# Patient Record
Sex: FEMALE | Race: OTHER | Employment: UNEMPLOYED | URBAN - METROPOLITAN AREA
[De-identification: names, ages, dates, MRNs, and addresses within clinical notes are randomized per-mention and may not be internally consistent; named-entity substitution may affect disease eponyms.]

---

## 2017-01-24 PROBLEM — M17.9 OSTEOARTHRITIS, KNEE: Status: ACTIVE | Noted: 2017-01-24

## 2017-01-24 PROBLEM — E11.9 T2DM (TYPE 2 DIABETES MELLITUS) (HCC): Status: ACTIVE | Noted: 2017-01-24

## 2017-01-24 PROBLEM — M17.10 OSTEOARTHRITIS, KNEE: Status: ACTIVE | Noted: 2017-01-24

## 2017-01-25 NOTE — ASSESSMENT & PLAN NOTE
Early worsening right knee pain with swelling. Worse with use. X-rays of the knee is ordered. Use an Ace wrap as directed. Continue on tramadol and try Tylenol 500 mg 3 times a day.   Advised to follow-up with Dr. Ceasar Felton for an evaluatio

## 2017-01-25 NOTE — ASSESSMENT & PLAN NOTE
Thyroid functions look normal on  labs. Patient is currently on levothyroxine at 125 µg once daily. Continue the same dose of medications and will recheck labs in about 4-6 months.

## 2017-01-25 NOTE — ASSESSMENT & PLAN NOTE
Diabetes most likely insulin resistance. Exercise, diet controlled and metformin should help reverse some of these changes. Restriction and starches as well as carbohydrates discussed.   Patient did work with the diabetic Association and understands need

## 2017-01-25 NOTE — PROGRESS NOTES
HPI:    Patient ID: Claudine Shah is a 54year old female. Thyroid Problem  This is a chronic problem. The current episode started more than 1 year ago. The problem occurs constantly. The problem has been gradually improving.  Associated symptoms inclu Rfl: 5   Omeprazole 20 MG Oral Tab EC Take 1 tablet by mouth once daily. Disp: 90 tablet Rfl: 0   Biotin 5000 MCG Oral Cap Take 1 tablet by mouth daily. Disp:  Rfl:    Cyanocobalamin (B-12) 5000 MCG Oral Cap Take 1 tablet by mouth daily.  Disp:  Rfl:    Cra External ear normal.   Left Ear: External ear normal.   Nose: Nose normal.   Mouth/Throat: Oropharynx is clear and moist. No oropharyngeal exudate. Eyes: Conjunctivae and EOM are normal. Pupils are equal, round, and reactive to light.  Right eye exhibits knee is ordered. Use an Ace wrap as directed. Continue on tramadol and try Tylenol 500 mg 3 times a day. Advised to follow-up with Dr. Ceasar Felton for an evaluation.          Relevant Medications    Butalbital-APAP-Caffeine -40 MG Oral T 20 MG Oral Tab EC 90 tablet 0      Sig: Take 1 tablet by mouth once daily. MetFORMIN HCl  MG Oral Tablet 24 Hr 90 tablet 1      Sig: Take 1 tablet (500 mg total) by mouth daily.       Levothyroxine Sodium 125 MCG Oral Tab 90 tablet 1      Sig: Ta

## 2017-01-25 NOTE — ASSESSMENT & PLAN NOTE
Patient has completed the CPAP titration study and request for the CPAP tubing and mask has been sent in to [facility]. Per patient accounts nobody has contacted her as yet and she has not started using the CPAP.   We will contact [facility] in a.m. to

## 2017-03-03 PROBLEM — N39.0 UTI (URINARY TRACT INFECTION): Status: ACTIVE | Noted: 2017-03-03

## 2017-03-03 NOTE — ASSESSMENT & PLAN NOTE
Recent episode of dysuria with flank pain, was seen in immediate care and treated with Cipro. Dysuria has resolved but patient continues to complain of mild left flank pain.   On examination today she does not have any rebound or guarding or palpable abnor

## 2017-03-03 NOTE — PATIENT INSTRUCTIONS
Problem List Items Addressed This Visit        Unprioritized    UTI (urinary tract infection) - Primary     Recent episode of dysuria with flank pain, was seen in immediate care and treated with Cipro.   Dysuria has resolved but patient continues to complai

## 2017-03-03 NOTE — PROGRESS NOTES
HPI:    Patient ID: Jayden Chavis is a 54year old female. UTI  This is a new problem. The current episode started in the past 7 days. The problem occurs intermittently. Progression since onset: burning pain and radiation into the left flank. ,seen in Biotin 5000 MCG Oral Cap Take 1 tablet by mouth daily. Disp:  Rfl:    Cyanocobalamin (B-12) 5000 MCG Oral Cap Take 1 tablet by mouth daily. Disp:  Rfl:    Cranberry 250 MG Oral Tab Take 2 tablets by mouth daily as needed.  Disp:  Rfl:    MetFORMIN HCl ER present. Cardiovascular: Normal rate, regular rhythm, normal heart sounds and intact distal pulses. No murmur heard. Pulmonary/Chest: Effort normal and breath sounds normal. She has no wheezes. She has no rales. She exhibits no tenderness.    Abdomina Butalbital-APAP-Caffeine -40 MG Oral Tab 30 tablet 0      Sig: TAKE 1 TABLET BY MOUTH EVERY 6 HOURS AS NEEDED FOR PAIN           Imaging & Referrals:  None       YV#[id number]

## 2017-03-21 NOTE — TELEPHONE ENCOUNTER
Medication is no longer covered on this patient is seen by psychiatrist.  Patient will have to pay out of pocket and bite herself or be seen by a psychiatrist for a refill on this medication.

## 2017-03-21 NOTE — TELEPHONE ENCOUNTER
Pt states she has not been able to get her prescription for aderral from [facility] because the pharmacy states the medication needs authorization.

## 2017-03-23 NOTE — TELEPHONE ENCOUNTER
PA for Amphetamine-dextroamphetamine 15 mg tab completed with EPS via CMM response time 24-72 hours KEY [id number]. Patient is requesting a rx for Flexeril for her dislocated jaw.

## 2017-03-24 NOTE — TELEPHONE ENCOUNTER
I called pt. As VISHNU requested on results of CPAP usage that pt. Needs to use 5-6 hrs. A day. Pt.  said in the last 3 days she has been using the CPAP and said she wakes up with a headache, runny nose that is painful, sore throat.   I asked if she was getti

## 2017-03-24 NOTE — TELEPHONE ENCOUNTER
As she has not met the criteria for using the CPAP– and she has been informed of the need, insurance will deny coverage and take the machine back

## 2017-03-24 NOTE — TELEPHONE ENCOUNTER
I'm sorry Dr. Maurilio Collier. The plan does cover the medication for 12 months. What would you like for me to do at this point.

## 2017-03-24 NOTE — TELEPHONE ENCOUNTER
I did not ask you to submit a prior authorization for this medication. I did say that this medication is no longer covered unless the patient is seen by a psychiatrist.  I would prefer that you follow my directions next time.

## 2017-04-05 NOTE — PROCEDURES
Procedure: The risks and benefits of a cortisone injection were discussed with the patient. An informational sheet was also provided and the patient had ample time to review it.   Under sterile preparation, the right knee was injected with 30 mg of Kenalog

## 2017-04-05 NOTE — H&P
Chief Complaint: Right knee pain    NURSING INTAKE COMMENTS: Patient presents with:  Consult: Right knee pain- pt states pain started 1 yr ago, but over last 2 months pain has become severe. History of present illness:   This 54year old female comes Maternal Grandmother    • Diabetes Sister    • Diabetes Sister    • Obesity Sister    • Obesity Sister    • Obesity Sister         Smoking Status: Never Smoker                      Smokeless Status: Never Used                        Alcohol Use: No space one time. Assessment: 54year old female with right knee pain.     Plan:   Rest, Ice, Elevation, and NSAID's if not contraindicated from a medical standpoint  Cortisone injection   If not better, MRI

## 2017-04-05 NOTE — PROGRESS NOTES
Per verbal order from VT, draw up 3ml of Kenalog 10 and 3ml of 1% lidocaine for cortisone injection to right knee.  Bakari Rg RN

## 2017-05-16 NOTE — TELEPHONE ENCOUNTER
Actions Requested: Requesting RX  For UTI-unable to come for appt no Transportation and lives far away  Situation/Background   Problem: UTI   Onset: yesterday   Associated Symptoms: taking  Cranberry pills and Azo pills,last dose this AM,urinary frequency/ antibiotic < 3 days for UTI and painful urination not improved    Care Advice Discussed:  * Drink Extra Fluids  * Cranberry Juice  * Warm Saline SITZ Baths to Reduce Pain

## 2017-05-16 NOTE — TELEPHONE ENCOUNTER
Pt called in stating she has a UTI. Pt states she knows it's a UTI because she oftenly gets them. Pt states she has the urination frequency feeling, burning and pain sensation when urinating and has lower abdominal pain.   Pt states she cannot come in for

## 2017-05-18 NOTE — TELEPHONE ENCOUNTER
VISHNU - please advise regarding CPAP usage below. HME is also asking if you would like to send a formal order to D/C treatment or if HME should just p/u equipment AMA.    (pt has not been contacted yet - pending your review and instructions)

## 2017-05-18 NOTE — TELEPHONE ENCOUNTER
Nicolas Welch states that he faxed a form today with the information for a 90 day usage on patient's CPAP machine. Per Nuno the patient only used 32% usage in the past 90 days. The insurance looks for a 70 % usage of 90 day.  Nicolas Welch would like to inform the doctor jai

## 2017-05-18 NOTE — TELEPHONE ENCOUNTER
I do not recommend stopping use. They can pick it up AMA. Please let the patient know that this will happen.

## 2017-05-18 NOTE — TELEPHONE ENCOUNTER
Pt was called and notified regarding notification from E regarding CPAP denial. Per insurance guidelines - pt must use CPAP for 4 hours per night on 70% of nights during the entire rental period of the machine.   (summary report shows that pt has only bee

## 2017-05-31 NOTE — TELEPHONE ENCOUNTER
No pt needs to keep scheduled appt and will do the follow up needs at that time,if she did not use it on vacation as well as the required amount of time as specified to her clearly then she can wait to be seen.

## 2017-05-31 NOTE — TELEPHONE ENCOUNTER
[facility] from [address] [place] [address] called back and stated [employer] \"doesn't care\" if pt was on vacation or not. Pt must use equipment for at least 70% of the nights during the first 90 day period. Equipment was already p/u from the pt's home on 05/19/17.   [facility] states i

## 2017-06-27 PROBLEM — M25.361 INSTABILITY OF RIGHT KNEE JOINT: Status: ACTIVE | Noted: 2017-06-27

## 2017-06-28 NOTE — ASSESSMENT & PLAN NOTE
Chronic low back pain and mid back pain associated with scoliosis. Has had multiple interventions done none of which have helped thus far. Last seen by Dr. Italo Nagy  In 2015.   Her visits as well as further testing but not covered under her current insurance a

## 2017-06-28 NOTE — PROGRESS NOTES
HPI:    Patient ID: Jayden Chavis is a 50year old female. Thyroid Problem   This is a chronic problem. The current episode started more than 1 year ago. The problem occurs constantly. The problem has been gradually improving.  Associated symptoms incl moderate relief. Her past medical history is significant for osteoarthritis. Review of Systems   Constitutional: Negative. HENT: Negative. Eyes: Negative. Respiratory: Negative. Cardiovascular: Negative. Gastrointestinal: Negative.   Ne TEST In Vitro Strip TEST ONCE D Disp:  Rfl: 0   TRUEPLUS LANCETS 33G Does not apply Misc TEST ONCE D Disp:  Rfl: 0   Citalopram Hydrobromide 20 MG Oral Tab TAKE 1 TABLET(20 MG) BY MOUTH DAILY Disp: 30 tablet Rfl: 5   Biotin 5000 MCG Oral Cap Take 1 tablet There is no tenderness. There is no rebound. Musculoskeletal: She exhibits no edema or tenderness. Lymphadenopathy:     She has no cervical adenopathy. Neurological: She is alert and oriented to person, place, and time. She has normal reflexes.  No cr Other Relevant Orders    CBC WITH DIFFERENTIAL WITH PLATELET    COMP METABOLIC PANEL (14)    HEMOGLOBIN A1C    LIPID PANEL    ASSAY, THYROID STIM HORMONE    URINALYSIS, ROUTINE    MICROALB/CREAT RATIO, RANDOM URINE    OPTOMETRY - INTERNAL    Thoracic radic 0      Sig: Take 1 tablet (15 mg total) by mouth 2 (two) times daily. amphetamine-dextroamphetamine (ADDERALL) 15 MG Oral Tab 60 tablet 0      Sig: Take 1 tablet (15 mg total) by mouth 2 (two) times daily.       amphetamine-dextroamphetamine (ADDERALL)

## 2017-06-28 NOTE — ASSESSMENT & PLAN NOTE
Recent fall with prepatellar hematoma. Slight instability with difficulty walking. Advised to follow-up with orthopedics–Dr. July Cedillo for an evaluation. Referral has been generated.   Meloxicam has been started at 15 mg 1 tablet once daily after meal.

## 2017-06-28 NOTE — PATIENT INSTRUCTIONS
Problem List Items Addressed This Visit        Unprioritized    Hypothyroidism - Primary    Relevant Medications    Levothyroxine Sodium 125 MCG Oral Tab    Instability of right knee joint     Recent fall with prepatellar hematoma.   Slight instability with Dr. Dial Gains spine. Referral has been sent in           Relevant Orders    ORTHOPEDIC - INTERNAL    Vitamin D deficiency      Other Visit Diagnoses    None.

## 2017-06-29 NOTE — TELEPHONE ENCOUNTER
VISHNU - pt was seen by you on 06/27 for a f/u.    Would you like to order a CPAP \"re-start\"? (pend for sign off)

## 2017-09-06 NOTE — TELEPHONE ENCOUNTER
Pt would like a refill on her adderall,butalbital  and tramadol medication. Please call pt when the script is ready for  [facility].        Current Outpatient Prescriptions:  TraMADol HCl 50 MG Oral Tab 1 tab po bid prn Disp: 60 tablet Rfl: 2   Butalbital-A

## 2017-09-12 NOTE — TELEPHONE ENCOUNTER
Routine meds refilled per protocol.   VISHNU please advise on refill controlled substances    Diabetes Medications  Protocol Criteria:  · Appointment scheduled in the past 6 months or the next 3 months  · A1C < 7.5 in the past 6 months  · Creatinine in the pas

## 2017-09-12 NOTE — TELEPHONE ENCOUNTER
From: Piero Wright  Sent: 9/7/2017 2:27 PM CDT  Subject: Medication Renewal Request    Piero Wright would like a refill of the following medications:  Citalopram Hydrobromide 20 MG Oral Tab Carmen Allen MD]  TRUE METRIX BLOOD GLUCOSE TEST In Vitro

## 2017-09-15 NOTE — TELEPHONE ENCOUNTER
No she will need an appointment for the medications. May add the end of the  any day for prescription refill.

## 2017-09-15 NOTE — TELEPHONE ENCOUNTER
[facility] and is not able to see Dr Jose Holliday until Dec when replacement insurance is valid and Pt would like to know if Dr Jose Holliday can give her 2 more months worth of her medication Adderol that she picked up today.  Please advise

## 2017-11-14 NOTE — TELEPHONE ENCOUNTER
Fax received from [facility] stating tramadol hcl 50mg tab is approved for 365 days effective 11/13/17.  Pt notified by Collin Hoffman

## 2017-11-28 NOTE — TELEPHONE ENCOUNTER
PA for True Metrix blood glucose test strips completed with EPS via CMM response time 24-72 hours KEY [id number].

## 2017-11-29 NOTE — TELEPHONE ENCOUNTER
Pharmacy called in requesting medication below be refilled. [facility] could only see the Levothyroxine Sodium 125 MCG on Pt's medication list. Please advise and send prescription.     Levothyroxine Sodium 100 MCG

## 2017-11-30 NOTE — TELEPHONE ENCOUNTER
Fax received from [facility] stating true metrix blood glucose test strips is approved for 7 days effective 11/28/17.  Pharmacy contacted

## 2017-12-01 NOTE — TELEPHONE ENCOUNTER
Hypothyroid Medications  Protocol Criteria:  Appointment scheduled in the past 12 months or the next 3 months  TSH resulted in the past 12 months that is normal  Recent Outpatient Visits            5 months ago Hypothyroidism, unspecified type    Neda

## 2018-01-23 NOTE — TELEPHONE ENCOUNTER
Pt is requesting refill on :  Pt has appt scheduled on 3/1      Current Outpatient Prescriptions:  Amphetamine-Dextroamphet ER 15 MG Oral Capsule SR 24 Hr Take 15 mg by mouth 2 (two) times daily.  Disp:  Rfl:

## 2018-02-07 NOTE — TELEPHONE ENCOUNTER
See patient's request below.  Please advise on refill request.    Please respond to pool: EM [facility] LPN/CMA    Refill Protocol Appointment Criteria  · Appointment scheduled in the past 6 months or in the next 3 months  Recent Outpatient Visits            7

## 2018-02-07 NOTE — TELEPHONE ENCOUNTER
Please see 1/23 encounter. Pt stts she is still waiting for approval for Adderall. .      Note      Please respond to pool: EM [facility] LPN/CMA     Patient was an [facility] patient who now has [facility].    She schedule the first available appointmen

## 2018-02-09 NOTE — TELEPHONE ENCOUNTER
Patient is aware that she needs an appointment for both these medications. She has been told about this anteriorly visits. We are now being monitored for these medications on prescription monitoring services.   Please set up an appointment ASAP if she wan

## 2018-02-15 NOTE — ASSESSMENT & PLAN NOTE
Thyroid function tests look stable on current medications. Continue on levothyroxine at 125 mcg daily and recheck labs in about 6 months.

## 2018-02-15 NOTE — ASSESSMENT & PLAN NOTE
Chronic Jaw and neck pain. Patient has been on Flexeril in the past.  This has been changed to tizanidine–2 mg at bedtime–refills have been provided.

## 2018-02-15 NOTE — ASSESSMENT & PLAN NOTE
History of sleep apnea with worsening symptoms of daytime drowsiness.   She has completed the CPAP titration study but did not use it as needed by her insurance the last time and hence was taken back patient currently requires to machine due to worsening da

## 2018-02-15 NOTE — PROGRESS NOTES
HPI:    Patient ID: Juan R Gutierrez is a 50year old female. Diabetes   She presents for her initial diabetic visit. She has type 2 diabetes mellitus. Her disease course has been fluctuating. There are no hypoglycemic associated symptoms.  There are no d HCl 2 MG Oral Tab Take 1 tablet (2 mg total) by mouth nightly. Disp: 90 tablet Rfl: 1   amphetamine-dextroamphetamine (ADDERALL) 15 MG Oral Tab Take 1 tablet (15 mg total) by mouth 2 (two) times daily.  Disp: 60 tablet Rfl: 0   [START ON 3/17/2018] amphetam Victoriano  Sumatriptan                 Comment:Left side weakness             Left side weakness             Other reaction(s): weakness      02/15/18  1137   BP: 140/100   Pulse:    Resp:    Temp:      Body mass index is 36.76 kg/m².     PHYSICAL EXAM:   Ph able to tolerate the citalopram and hence discontinued it. She does not feel that she requires this at this time so will continue to monitor. Hypothyroidism - Primary     Thyroid function tests look stable on current medications.   Continue on levo TAKE 1 TABLET BY MOUTH EVERY 6 HOURS AS NEEDED FOR PAIN      TiZANidine HCl 2 MG Oral Tab 90 tablet 1      Sig: Take 1 tablet (2 mg total) by mouth nightly.       amphetamine-dextroamphetamine (ADDERALL) 15 MG Oral Tab 60 tablet 0      Sig: Take 1 tablet (1

## 2018-02-15 NOTE — ASSESSMENT & PLAN NOTE
Mild depression–patient has not been able to tolerate the citalopram and hence discontinued it. She does not feel that she requires this at this time so will continue to monitor.

## 2018-02-15 NOTE — ASSESSMENT & PLAN NOTE
Stable on metformin, hemoglobin A1c is at 6.9. Weights have been stable. Advised to continue strict diet control and continue the same medications. Eye exam referral for evaluation has been provided. Foot exam has been stable.

## 2018-02-15 NOTE — PATIENT INSTRUCTIONS
Problem List Items Addressed This Visit        Unprioritized    Bruxism     Chronic Jaw and neck pain. Patient has been on Flexeril in the past.  This has been changed to tizanidine–2 mg at bedtime–refills have been provided.          Depressive disorder

## 2018-02-15 NOTE — ASSESSMENT & PLAN NOTE
Chronic low back pain, mid back pain and scoliosis. Multiple interventions at this time without much improvement. Last seen by Dr. Elvis Travis in 2015. Will consider referral to Dr. Agapito Reveles for an evaluation.   Continue on tramadol at 50 mg 1 tablet 2 times christina

## 2018-03-06 NOTE — PROGRESS NOTES
Pt arrived today for a nurse visit for a blood pressure check. Reviewed med list with patient, and she is currently taking no blood pressure meds right now. First blood pressure taken was 142/93 with a pulse of 76.   Second blood pressure was taken 14 mins

## 2018-03-26 PROBLEM — H43.393 VITREOUS FLOATER, BILATERAL: Status: ACTIVE | Noted: 2018-03-26

## 2018-03-26 NOTE — PATIENT INSTRUCTIONS
Vitreous floater, bilateral  No treatment is required. Will continue to observe. I advised if she should ever have a large amount of floaters occur with flashes suddenly to call ASAP.     [facility]  I advised patient that there i

## 2018-03-26 NOTE — ASSESSMENT & PLAN NOTE
New glasses RX given to update as needed. Recommend that she get a deeper frame. She can have plastic frames but they should have silicone nosepads.

## 2018-03-26 NOTE — PROGRESS NOTES
Jacinta Olivares is a 50year old female. HPI:     HPI     Diabetic Eye Exam   Diabetes characteristics include Type 2, controlled with diet and taking oral medications. Duration of 1 year.  Additional comments: Patient has longstanding floaters ou for t Onset   • Heart Attack Father 76     MI   • Diabetes Father    • Hypertension Father    • Lipids Father    • Heart Attack Mother      MI   • Heart Disorder Mother    • Hypertension Mother    • Heart Attack Other      MI AT 45/MI AT 50 AFTER CHEMO, SISTERS tablet Rfl: 0   Omeprazole 20 MG Oral Tab EC Take 1 tablet by mouth once daily. Disp: 90 tablet Rfl: 0   Meloxicam 15 MG Oral Tab Take 1 tablet (15 mg total) by mouth daily.  Disp: 30 tablet Rfl: 3   Blood Glucose Monitoring Suppl (TRUE METRIX METER) w/Maria L Right Left     Normal Normal            Slit Lamp and Fundus Exam     External Exam       Right Left    External Normal Normal          Slit Lamp Exam       Right Left    Lids/Lashes Normal Normal    Conjunctiva/Sclera Nasal/temp pinguecula Nasal/temp pi placed in this encounter.       Meds This Visit:    No prescriptions requested or ordered in this encounter     Follow up instructions:  Return in about 1 year (around 3/26/2019) for Diabetic Eye exam.    3/26/2018  Scribed by: Monica Barriga OD

## 2018-03-26 NOTE — ASSESSMENT & PLAN NOTE
No treatment is required. Will continue to observe. I advised if she should ever have a large amount of floaters occur with flashes suddenly to call ASAP.

## 2018-04-12 PROBLEM — I10 ESSENTIAL HYPERTENSION: Status: ACTIVE | Noted: 2018-04-12

## 2018-04-13 NOTE — PROGRESS NOTES
HPI:    Patient ID: Woody Farrell is a 50year old female.     Ghulam Best MD - 2015 3:36 PM CST  Formatting of this note may be different from the original.    Patient Name: Woody Farrell   MRN/CSN: [id number]   /Age: 1965 / Macy Clarke (SYNTHROID) 100 MCG tablet Take 100 mcg by mouth daily. • lidocaine 5 % Place 1 Patch onto the skin once daily as needed.    • Nerve Stimulator (STANDARD TENS) CANDACE   • ondansetron (ZOFRAN) 4 MG disintegrating tablet Take 4 mg by mouth every 12 hours as n sagittal   reformatted images were generated. FINDINGS:    Lower chest: Unremarkable     Liver and biliary system: Unremarkable. Status post cholecystectomy. Spleen: Unremarkable. Pancreas: Unremarkable. Adrenal glands: Unremarkable.     Kidneys swelling. Pertinent negatives include no abdominal pain or change in bowel habit. Nothing aggravates the symptoms. Treatments tried: levothyroxine 125 mcgs a day. The treatment provided moderate relief.    Viewed by Jayden Chavis on 2/15/2018 12:00 PM   W (two) times daily.  Disp: 60 tablet Rfl: 0   TraMADol HCl 50 MG Oral Tab 1 tab po bid prn Disp: 60 tablet Rfl: 1   Levothyroxine Sodium 125 MCG Oral Tab Take 1 tablet (125 mcg total) by mouth before breakfast. Disp: 90 tablet Rfl: 1   Glucose Blood (TRUE ME Neck: Normal range of motion. Neck supple. No JVD present. No thyromegaly present. Cardiovascular: Normal rate, regular rhythm, normal heart sounds and intact distal pulses. No murmur heard.   Pulmonary/Chest: Effort normal and breath sounds normal. 500 mg once daily. She has had trouble with weight gain. Would consider increasing the metformin to 2 times daily to help with the weight. Advised to replace meal with a protein shake or Glucerna once daily.   Eat a regular meal with vegetables and chick

## 2018-04-13 NOTE — PATIENT INSTRUCTIONS
Problem List Items Addressed This Visit        Unprioritized    Depressive disorder - Primary    Essential hypertension     Blood pressure 142/83, pulse 78, temperature 98.9 °F (37.2 °C), temperature source Oral, height 5' 2\" (1.575 m), weight 205 lb 2 oz

## 2018-04-13 NOTE — ASSESSMENT & PLAN NOTE
Blood pressure 142/83, pulse 78, temperature 98.9 °F (37.2 °C), temperature source Oral, height 5' 2\" (1.575 m), weight 205 lb 2 oz (93 kg). Blood pressure looks elevated even on metoprolol at 25 mg daily. Patient's heart rate looks stable.   She has had

## 2018-04-13 NOTE — ASSESSMENT & PLAN NOTE
Patient is currently on metformin 500 mg once daily. She has had trouble with weight gain. Would consider increasing the metformin to 2 times daily to help with the weight. Advised to replace meal with a protein shake or Glucerna once daily.   Eat a regu

## 2018-05-10 PROBLEM — Z00.00 ROUTINE PHYSICAL EXAMINATION: Status: ACTIVE | Noted: 2018-05-10

## 2018-05-10 PROBLEM — K29.00 ACUTE SUPERFICIAL GASTRITIS WITHOUT HEMORRHAGE: Status: ACTIVE | Noted: 2018-05-10

## 2018-05-10 NOTE — TELEPHONE ENCOUNTER
Pharmacy calling in requesting PA for Adderall 15mg bid.   ID#: [id number],  [facility] Ph#: [phone]

## 2018-05-11 NOTE — ASSESSMENT & PLAN NOTE
Intermittent vaginal discharge and odor. Currently without any significant abnormality on evaluation. Metronidazole 500 mg 2 times daily for 5 days as discussed and will follow up. No signs of a UTI so we will hold off treatment with an antibiotic.

## 2018-05-11 NOTE — ASSESSMENT & PLAN NOTE
Patient is currently on metformin 500 mg twice daily which he seems to have tolerated well. Recent labs look stable. She has been losing weight at this time. Wt Readings from Last 6 Encounters:  05/10/18 : 198 lb 1.6 oz (89.9 kg)  04/12/18 : 205 lb 2 oz (

## 2018-05-11 NOTE — ASSESSMENT & PLAN NOTE
Abdominal pain–epigastric associated with bloating, nausea, loss of appetite. All food causes worsening pain. Patient has been intentionally trying to lose weight. She does have a history of H. pylori infection in the past that was treated.   Recheck sto

## 2018-05-11 NOTE — ASSESSMENT & PLAN NOTE
Normal exam.  Labs as ordered. Skin check normal.  No significant abnormal nevi. Breast exam completed–no palpable abnormalities, discharge from the nipples or axillary adenopathy.   Mammogram,1 Yr due on 08/19/2017  dexa scan ordered  No cervical or ingu

## 2018-05-11 NOTE — TELEPHONE ENCOUNTER
PA for Amphetamine-Dextroamphetamine 15 mg tab completed with [employer] via CMM response time 3-5 business days KEY [id number].

## 2018-05-11 NOTE — PATIENT INSTRUCTIONS
Problem List Items Addressed This Visit        Unprioritized    Acute superficial gastritis without hemorrhage     Abdominal pain–epigastric associated with bloating, nausea, loss of appetite. All food causes worsening pain.   Patient has been intentionall Vaginitis     Intermittent vaginal discharge and odor. Currently without any significant abnormality on evaluation. Metronidazole 500 mg 2 times daily for 5 days as discussed and will follow up.   No signs of a UTI so we will hold off treatment with an an

## 2018-05-18 NOTE — TELEPHONE ENCOUNTER
PA denied; plan requires health records must be sent in to show patient has all of the following for at least 6 months in a row.  At least 3-5 inattention or 3-5 hyperactive-impulsive symptoms listed in the DSM-V, symptoms must have started before patient w

## 2018-06-05 NOTE — TELEPHONE ENCOUNTER
Diabetes Medications  Protocol Criteria:  · Appointment scheduled in the past 6 months or the next 3 months  · A1C < 7.5 in the past 6 months  · Creatinine in the past 12 months  · Creatinine result < 1.5   Recent Outpatient Visits            3 weeks ago T

## 2018-06-27 NOTE — TELEPHONE ENCOUNTER
Please let patient know that I cannot prescribe the Adderall any further. She will need to go to a psychiatrist to have this done. She may pay out-of-pocket pick it up and then will not need prior authorization.

## 2018-06-27 NOTE — TELEPHONE ENCOUNTER
Pt was called and notified regarding VISHNU's message below, w/verbalized understanding. Pt states she paid OOP for last fill.

## 2018-06-27 NOTE — TELEPHONE ENCOUNTER
Please advise   Last refilled on 2/15/18  #60 1 refill    Refill Protocol Appointment Criteria  · Appointment scheduled in the past 6 months or in the next 3 months  Recent Outpatient Visits            1 month ago Type 2 diabetes mellitus without complicat

## 2018-07-03 NOTE — TELEPHONE ENCOUNTER
Please see triage TE 7/3/18,will close this encounter . From: Heidi Elder  To: Walker Coronado MD  Sent: 7/3/2018  5:09 PM CDT  Subject: Other    Last visit Dr Magali Travis ordered antibiotics for UTI showed on lab results.  The pharmacy  never received the

## 2018-07-05 NOTE — TELEPHONE ENCOUNTER
Morphine                UNKNOWN  Prochlorperazine        JAIRO    Comment:Compazine  Prochlorperazine Ed*    JAIRO  Sumatriptan                 Comment:Left side weakness             Left side weakness             Other reaction(s): weakness    Cipro

## 2018-07-05 NOTE — TELEPHONE ENCOUNTER
F/u call, Pt stated she still have s/s, burning with urination,pain/pressure in lower abdomen,stated she is taking AZO tablet/cranberry pills-mention she has a Hx of inflammation in her bladder wall   Pharmacy pending   Please reply to robert: JONO Vieira

## 2018-08-31 NOTE — TELEPHONE ENCOUNTER
LOV: 5/10/18, LR: 2/15/18, script pended. Please advise.     Please respond to pool: EM IM [facility] LPN/CMA

## 2018-09-04 NOTE — TELEPHONE ENCOUNTER
Pharmacy   [facility], [address] [place] [address] [address] AT [facility], [phone], [phone]   Medication Detail    Disp Refills Start End    BUTALBITAL-APAP-CAFFEINE -40 MG Oral Tab 30 tablet 1 8/30/2018     Sig: Hailey Betancourt

## 2018-09-26 NOTE — TELEPHONE ENCOUNTER
Pt called back,advised why rx was not sent, pt stated she have not taken rx in 2 mths or longer-rx sent

## 2018-09-26 NOTE — TELEPHONE ENCOUNTER
Action Requested: Summary for Provider     []  Critical Lab, Recommendations Needed  [] Need Additional Advice  []   FYI    []   Need Orders  [x] Need Medications Sent to Pharmacy  []  Other     SUMMARY: Per pt urinary burning x 2 days.  Associate with taylor

## 2018-10-11 NOTE — TELEPHONE ENCOUNTER
Refilled per protocol    Hypertensive Medications  Protocol Criteria:  · Appointment scheduled in the past 6 months or in the next 3 months  · BMP or CMP in the past 12 months  · Creatinine result < 2  Recent Outpatient Visits            5 months ago Type

## 2018-10-15 NOTE — PROGRESS NOTES
HPI:    Patient ID: Amaury Garcia is a 48year old female. HPI   Patient presents today to discuss a few concerns. She has been having left ear pain off and on for last 2 weeks. Has also had worsening hearing loss of the left ear during this time. Psychiatric/Behavioral: Positive for hallucinations (visual, occasionally with tramadol ). Current Outpatient Medications:  amphetamine-dextroamphetamine (ADDERALL) 15 MG Oral Tab Take 1 tablet (15 mg total) by mouth 2 (two) times daily.  Dis BY MOUTH EVERY DAY Disp: 90 tablet Rfl: 1   TiZANidine HCl 2 MG Oral Tab Take 1 tablet (2 mg total) by mouth nightly. Disp: 90 tablet Rfl: 1   Meloxicam 15 MG Oral Tab Take 1 tablet (15 mg total) by mouth daily.  Disp: 30 tablet Rfl: 3   Multiple Vitamins-M management. 2. Thoracic radiculopathy  Ongoing issues with back pain. Typically managed with tramadol 50 mg 1-2 tablets daily however causing more visual hallucinations when she takes 2 pills during the day.   Will stop the medication and replace with N

## 2018-12-20 NOTE — PATIENT INSTRUCTIONS
Abdominal pain- left sided  - call Dr. Justo Garcia to make sure not heart related  - FODMAP diet  - antacid as needed   - IB guard before meals   - florastor 250mg twice a day ( probiotic)  - consider further workup if ongoing issues

## 2018-12-27 NOTE — PROGRESS NOTES
Manasa Cruz is a 48year old female. HPI:   Patient presents with:  Abdominal Pain: left flank ,gastritis,last colonoscopy was 12/2014    The patient has been experiencing left-sided pain in her flank/left upper quadrant.   She does have bloating and but thinks she has glc   • Cancer Brother 52        KIDNEY   • Cancer Brother    • Cancer Sister    • Cancer Sister    • Cancer Maternal Grandmother    • Ovarian Cancer Maternal Grandmother 61   • Diabetes Sister    • Diabetes Sister    • Obesity Sister TEST In Vitro Strip TEST ONCE D Disp:  Rfl: 0   TRUEPLUS LANCETS 33G Does not apply Misc TEST ONCE D Disp:  Rfl: 0   Biotin 5000 MCG Oral Cap Take 1 tablet by mouth daily. Disp:  Rfl:    Cyanocobalamin (B-12) 5000 MCG Oral Cap Take 1 tablet by mouth daily. related symptoms. She agrees to do this.     Plan  Abdominal pain- left sided  - call Dr. Alok Anderson to make sure not heart related  - FODMAP diet  - antacid as needed   - IB guard before meals   - florastor 250mg twice a day ( probiotic)  - consider further w

## 2019-01-12 NOTE — TELEPHONE ENCOUNTER
Refill passed per [facility] protocol.   Hypothyroid Medications  Protocol Criteria:  Appointment scheduled in the past 12 months or the next 3 months  TSH resulted in the past 12 months that is normal  Recent Outpatient Visits            3 weeks ago Abdominal pain, unspecified abdominal location    [facility], [address] [facility], Damien Jeffery MD    Office Visit    2 months ago Essential hypertension    [facility], [address], [address] [place] [address], [place], [employer]    Office Visit    8 months ago Type 2 diabetes mellitus without complication, without long-term current use of insulin [facility])    [facility], [address], Mohini House MD    Office Visit    9 months ago Depressive disorder    [facility], Mohini House MD    Office Visit    9 months ago Type 2 diabetes mellitus without complication, without long-term current use of insulin [facility])    [facility] Cleo Stewart [address] [place] [address]    Office Visit        Future Appointments       Provider Department Appt Notes    In 5 days Miki Bee MD [facility], [address] [facility] b/p check f/u        Lab Results   Component Value Date    TSH 2.01 02/13/2018    THYROIDFUNC 20.91 (H) 05/22/2016

## 2019-01-18 NOTE — PROGRESS NOTES
HPI:    Patient ID: Lora Henry is a 48year old female. Labs discussed      Hypertension   This is a chronic problem. The current episode started more than 1 year ago. The problem has been gradually improving since onset. The problem is controlled. aggravates the symptoms. Treatments tried: levothyroxine 125 mcgs a day. The treatment provided moderate relief. Review of Systems   Constitutional: Negative. HENT: Negative. Eyes: Negative. Respiratory: Negative.     Cardiovascular: Negative differently: Take 10 mg by mouth 3 (three) times daily as needed.  ) Disp: 90 capsule Rfl: 2   METFORMIN HCL  MG Oral Tablet 24 Hr TAKE 1 TABLET(500 MG) BY MOUTH TWICE DAILY WITH MEALS Disp: 180 tablet Rfl: 1   Glucose Blood (TRUE METRIX BLOOD GLUCOS of motion. Neck supple. No JVD present. No thyromegaly present. Cardiovascular: Normal rate, regular rhythm, normal heart sounds and intact distal pulses. No murmur heard. Pulmonary/Chest: Effort normal and breath sounds normal. She has no wheezes.  Sh DIFFERENTIAL WITH PLATELET    COMP METABOLIC PANEL (14)    HEMOGLOBIN A1C    LIPID PANEL    MICROALB/CREAT RATIO, RANDOM URINE    URINALYSIS, ROUTINE    Essential hypertension     Blood pressure 137/88, pulse 69, temperature 98.2 °F (36.8 °C), temperature BREAKFAST       Imaging & Referrals:  None       #[id number]

## 2019-01-18 NOTE — ASSESSMENT & PLAN NOTE
Stable diabetes, well controlled on metformin 500 mg twice daily which she seems to have tolerated well. Refills provided for recheck labs ordered.

## 2019-01-18 NOTE — ASSESSMENT & PLAN NOTE
Blood pressure 137/88, pulse 69, temperature 98.2 °F (36.8 °C), temperature source Oral, resp. rate 20, height 5' 2\" (1.575 m), weight 195 lb 9.6 oz (88.7 kg), SpO2 99 %, not currently breastfeeding.   Blood pressure looks elevated even on metoprolol at 25

## 2019-01-18 NOTE — ASSESSMENT & PLAN NOTE
Mild chronic depression with off-and-on variation in intensity of symptoms. She has been on a variety of medications and has discontinued it because of some drowsiness associated.   She would really like to retry small dose of medications as she has been f

## 2019-01-18 NOTE — PATIENT INSTRUCTIONS
Problem List Items Addressed This Visit        Unprioritized    Depressive disorder     Mild chronic depression with off-and-on variation in intensity of symptoms.   She has been on a variety of medications and has discontinued it because of some drowsiness

## 2019-02-28 PROBLEM — J01.01 ACUTE RECURRENT MAXILLARY SINUSITIS: Status: ACTIVE | Noted: 2019-02-28

## 2019-02-28 PROBLEM — M26.649 TMJ ARTHRITIS: Status: ACTIVE | Noted: 2019-02-28

## 2019-03-01 NOTE — PATIENT INSTRUCTIONS
Problem List Items Addressed This Visit        Unprioritized    Acute recurrent maxillary sinusitis     Augmentin as directed. Advised to start on Flonase 1 puff each nostril 2 times daily take Claritin 10 mg 1 tablet daily.            Relevant Medications bruxism. She has been advised to use a mouthguard. Referral for oral maxillofacial evaluation has been provided.          Relevant Medications    predniSONE 10 MG Oral Tab    Other Relevant Orders    ORAL AND MAXILLOFACIAL SURGERY - INTERNAL    Vitamin D

## 2019-03-01 NOTE — ASSESSMENT & PLAN NOTE
Continue on vitamin D at 50,000 units once a week for the next 12 weeks.   Recheck labs in about 3-4 months

## 2019-03-01 NOTE — ASSESSMENT & PLAN NOTE
Blood pressure 137/82, pulse 82, temperature 99.4 °F (37.4 °C), temperature source Oral, resp. rate 18, height 5' 2\" (1.575 m), weight 192 lb 8 oz (87.3 kg), SpO2 97 %, not currently breastfeeding.   Blood pressures look stable on metoprolol 25 mg daily an

## 2019-03-01 NOTE — ASSESSMENT & PLAN NOTE
History of chronic thoracic radiculopathy with recent worsening. She has had multiple interventions done without much improvement. She has been seen by physiatry. She is currently on tramadol 2 times daily and meloxicam at 50 mg daily.   Recent worsening

## 2019-03-01 NOTE — ASSESSMENT & PLAN NOTE
Severe jaw pain–bilateral with difficulty opening her mouth–history of bruxism. She has been advised to use a mouthguard. Referral for oral maxillofacial evaluation has been provided.

## 2019-03-01 NOTE — ASSESSMENT & PLAN NOTE
Augmentin as directed. Advised to start on Flonase 1 puff each nostril 2 times daily take Claritin 10 mg 1 tablet daily.

## 2019-03-01 NOTE — PROGRESS NOTES
HPI:    Patient ID: Estelle Hernandes is a 48year old female.     Written by Caden Puckett MD on 2/27/2019  8:52 PM   Vitamin D levels though improved remains low, will need to continue on vitamin D 50,000 units once a week for the next 12 weeks and have th nothing for the symptoms. Back Pain   This is a recurrent problem. The current episode started more than 1 month ago. The problem occurs constantly. The problem has been rapidly worsening since onset. Pertinent negatives include no abdominal pain.    Hype Respiratory: Positive for cough. Cardiovascular: Negative. Gastrointestinal: Negative. Negative for abdominal pain and change in bowel habit. Endocrine: Negative. Genitourinary: Negative.     Musculoskeletal: Positive for arthralgias, back gabi METFORMIN HCL  MG Oral Tablet 24 Hr TAKE 1 TABLET(500 MG) BY MOUTH TWICE DAILY WITH MEALS Disp: 180 tablet Rfl: 1   OMEPRAZOLE 20 MG Oral Tab EC TAKE 1 TABLET BY MOUTH EVERY DAY Disp: 90 tablet Rfl: 1   Glucose Blood (TRUE METRIX BLOOD GLUCOSE TEST are equal, round, and reactive to light. Right eye exhibits no discharge. Left eye exhibits no discharge. Neck: Normal range of motion. Neck supple. No JVD present. No thyromegaly present.    Cardiovascular: Normal rate, regular rhythm, normal heart sound advised to contact [phone] for an appointment. Relevant Orders    PHYSIATRY - INTERNAL    T2DM (type 2 diabetes mellitus) ([facility]     Stable diabetes on metformin 500 mg twice daily.   She has tolerated medications well without any significant side daily for 5 days, then once daily for 5 days and then half a tablet daily for 5   • Fluticasone Propionate 50 MCG/ACT Nasal Suspension 1 Bottle 3     Si PUFF EACH NOSTRIL 2 TIMES A  DAY       Imaging & Referrals:  PHYSIATRY - INTERNAL  ORAL AND MAXILLO

## 2019-03-02 NOTE — TELEPHONE ENCOUNTER
Received call from pt,  at 2/28/19 OV was informed by Dr. Patria Anand that she would be sending Valtrex to her pharmacy for a cold sore she has. Noted rx was not sent to pharmacy, nor was it mentioned on OV notes. Pt is also now requesting cough medication to be sent to pharmacy as well. States dry cough has increased since OV and states had a lot of trouble sleeping last night. States knows d/t HTN and Diabetes she needs to ask Dr. Favio Parrish what she can take. Pt aware Dr. Favio Parrish out of office today, but would like message sent to MD only. Please advise.  Thank you

## 2019-03-02 NOTE — TELEPHONE ENCOUNTER
Patient calling in c/o cough and head pain due to coughing. She was prescribed antibiotics and prednisone for sinus infection. She is currently only using [place] for cough. Advised to use over the counter Coricidin given hx of HTN.  Humidifier, steam, and vi

## 2019-04-04 NOTE — PROGRESS NOTES
[address]  Progress Note    CHIEF COMPLAINT:  Patient presents with:  Back Pain: Patient present back pain 6/10 took tramadol/tylenol. Had from an accident when she was young,/has sciolosis. occasion tinglin Laterality Date   • APPENDECTOMY     • APPENDECTOMY     • CHOLECYSTECTOMY     • COLONOSCOPY  2010    per NG   • COLONOSCOPY  2014   • HYSTERECTOMY      total   • HYSTERECTOMY     • NEEDLE BIOPSY LEFT      Benign   •   , 1 tab po bid prn Disp: 60 tablet Rfl: 2   Levothyroxine Sodium 125 MCG Oral Tab TAKE 1 TABLET(125 MCG) BY MOUTH BEFORE BREAKFAST Disp: 90 tablet Rfl: 1   LISINOPRIL 10 MG Oral Tab TAKE 1 TABLET(10 MG) BY MOUTH DAILY Disp: 30 tablet Rfl: 2   METOPROLOL SUCC denies  Weight Loss: denies   Cardiovascular  Chest Pain: denies  Irregular Heartbeat: denies   Respiratory  Painful Breathing: denies  Wheezing: denies   Gastrointestinal  Bowel Incontinence: denies  Heartburn: denies  Abdominal Pain: admits  Blood in Sto neg    Data    Radiology Imagin. Xray of thoracic spine shows scoliosis and wedge compression deformities       ASSESSMENT AND PLAN:  1. Acute midline thoracic back pain  Has mid thoracic pain, kyphosis and wedge deformity.  Need an MRI to determine pr

## 2019-04-04 NOTE — TELEPHONE ENCOUNTER
Gayatri for [facility] BS Online for authorization of approval for MRI T-spine wo cpt code 15831. Case Number: [id number]. Will fax clinical note when available. Will inform  Dr. Rahel Leung.

## 2019-04-05 PROBLEM — M54.6 ACUTE MIDLINE THORACIC BACK PAIN: Status: ACTIVE | Noted: 2019-04-05

## 2019-04-05 PROBLEM — R93.89 ABNORMAL RADIOGRAPHIC EXAMINATION: Status: ACTIVE | Noted: 2019-04-05

## 2019-04-11 NOTE — TELEPHONE ENCOUNTER
Faxed clinicals to Janny Jones for authorization of  MRI SPINE THORACIC cpt code 29727.  Approval Pending

## 2019-04-15 NOTE — TELEPHONE ENCOUNTER
Called "[employer] to check authorization approval of MRI T-spine cpt code 00924. Per [employer] automated system MRI is approved Authorization # [id number]. Will contact pt to advice of approvel. Patient has been notified L/m confirmin authorization of the MRI.

## 2019-04-18 NOTE — TELEPHONE ENCOUNTER
Refill passed per [facility] protocol.   Hypertensive Medications  Protocol Criteria:  · Appointment scheduled in the past 6 months or in the next 3 months  · BMP or CMP in the past 12 months  · Creatinine result < 2  Recent Outpatient Visits

## 2019-04-26 NOTE — TELEPHONE ENCOUNTER
Please review; protocol failed.     Requested Prescriptions     Pending Prescriptions Disp Refills   • Levothyroxine Sodium 125 MCG Oral Tab 90 tablet 1     Sig: TAKE 1 TABLET(125 MCG) BY MOUTH BEFORE BREAKFAST         Recent Visits  Date Type Provider Dept

## 2019-05-02 NOTE — TELEPHONE ENCOUNTER
Refill Protocol Appointment Criteria  · Appointment scheduled in the past 12 months or in the next 3 months  Recent Outpatient Visits            2 months ago Type 2 diabetes mellitus without complication, without long-term current use of insulin ([facility]    E Show Pathology Comment Variable: Yes

## 2019-05-06 NOTE — TELEPHONE ENCOUNTER
I refilled her duloxetine. I noticed that she has not yet had her MRI done. It has been quite a long time. Please ensure that she is on the road to getting it done.   Thank you

## 2019-05-06 NOTE — TELEPHONE ENCOUNTER
Medication request: Duloxetine 20 mg, Take 1 capsule by mouth daily.  Qt 30 Refills 0    LOV: 4/4/19  NOV: 5/17/19    Last refill:4/4/19

## 2019-05-08 NOTE — TELEPHONE ENCOUNTER
----- Message from Mario Da Silva MD sent at 5/8/2019 10:19 AM CDT -----  I personally reviewed an MRI of the thoracic spine done on May 7, 2019. It was unremarkable. No acute compression fractures. No significant wedge deformities.     Please let her

## 2019-05-17 NOTE — PROGRESS NOTES
[address]  Progress Note    CHIEF COMPLAINT:  Patient presents with:  Back Pain: Patient presents for follow up on back and neck pain, LOV:4/4/19.  Patient states since taking cymbalta has helped the pins an Tonsillitis     tonsillectomy 1979   • Unspecified sleep apnea 2007       SURGICAL HISTORY:  Past Surgical History:   Procedure Laterality Date   • APPENDECTOMY  1996   • APPENDECTOMY  1994   • CHOLECYSTECTOMY  2009   • COLONOSCOPY  12/8/2010    per NG   • DAILY Disp: 90 tablet Rfl: 1   Fluticasone Propionate 50 MCG/ACT Nasal Suspension 1 PUFF EACH NOSTRIL 2 TIMES A  DAY Disp: 1 Bottle Rfl: 3   Butalbital-APAP-Caffeine -40 MG Oral Tab TAKE 1 TABLET BY MOUTH EVERY 6 HOURS AS NEEDED FOR PAIN Disp: 30 tab Genitourinary  Difficulty Urinating: denies  Bladder Incontinence: denies   Musculoskeletal  Joint Stiffness: admits  Painful Joints: admits   Neurological  Loss of Strength Since last Visit: denies  Tingling/Numbness: admits            All other systems Fibromyalgia  Better with cymbalta 20, increase to 40, when she runs out call me for 60mg tabs. Start PT too. 4. Chronic pain syndrome  There appears to be a psychiatric component as well.     5. Type 2 diabetes mellitus without complication, without tessy

## 2019-05-22 NOTE — TELEPHONE ENCOUNTER
Controlled medication pending for review. If approved needs to be called in or faxed by on-site staff.     Requested Prescriptions     Pending Prescriptions Disp Refills   • Meloxicam 15 MG Oral Tab 30 tablet 3     Sig: Take 1 tablet (15 mg total) by mouth

## 2019-05-23 NOTE — TELEPHONE ENCOUNTER
Pt called in stating that the pharmacy has not heard anything regarding the medication below. Please advise and call back with confirmation when faxed.      Current Outpatient Medications:   •  traMADol HCl 50 MG Oral Tab, 1 tab po bid prn, Disp: 60 tab

## 2019-05-28 NOTE — TELEPHONE ENCOUNTER
Spoke with patient, has been taking cymbalta 20 mg bid, was told to contact office before she runs out and was going to be increased to 60 mg daily, patient states she is completley out of her medication and requesting 60 mg per last office visit note 5/17

## 2019-06-14 NOTE — TELEPHONE ENCOUNTER
Refill passed per [facility] protocol.     Requested Prescriptions   Pending Prescriptions Disp Refills   • metFORMIN HCl  MG Oral Tablet 24 Hr 180 tablet 1       Diabetes Medication Protocol Passed - 6/13/2019 12:34 PM        Passed - Creatinine

## 2019-06-27 NOTE — TELEPHONE ENCOUNTER
Drug DuLoxetine HCL 60mg oral caps DR Particles    Last refill 05/28/2019    LOV: 05/17/19    NOV: None - she was to return in one month so should have been around 6/14/19. Called patient to trt to schedule her for that one month follow up.   She said that

## 2019-07-12 NOTE — TELEPHONE ENCOUNTER
Duloxetine 60mg. Take 1 cap daily. #30. No refills    Last filled-6/28/2019    LOV-5/17/2019  NOV-none     Per encounter on 6/27/2019 patient was instructed to schedule NOV within 3 months. As of today still not scheduled.     Pended for 1 month- sign if ap

## 2019-07-17 NOTE — TELEPHONE ENCOUNTER
Spoke with patient, states she has not been taking duloxetine, states she had side effects, nightmares, fatigue,headaches and nausea. Advised to schedule appt to discuss another medication.  States she wanted to do PT for at least a week before following up

## 2019-07-18 NOTE — TELEPHONE ENCOUNTER
Spoke with patient, notified she will need to schedule an appt, will contact her daughter who drives her to appointments.

## 2019-07-29 NOTE — TELEPHONE ENCOUNTER
Controlled medication pending for review. If approved needs to be called in or faxed by on-site staff.     Last Rx: 1/17/19  LOV: 2/28/19

## 2019-09-17 NOTE — TELEPHONE ENCOUNTER
Please review; protocol failed.     Requested Prescriptions     Pending Prescriptions Disp Refills   • lisinopril 10 MG Oral Tab 90 tablet 1     Sig: TAKE 1 TABLET(10 MG) BY MOUTH DAILY         Recent Visits  Date Type Provider Dept   02/28/19 Office Visit

## 2019-10-10 NOTE — TELEPHONE ENCOUNTER
Current Outpatient Medications:  Metoprolol Succinate ER 25 MG Oral Tablet 24 Hr TAKE 1 TABLET BY MOUTH AT BEDTIME Disp: 90 tablet Rfl: 1

## 2019-10-11 NOTE — TELEPHONE ENCOUNTER
Refill passed per [facility] protocol.   Hypertensive Medications  Protocol Criteria:  · Appointment scheduled in the past 6 months or in the next 3 months  · BMP or CMP in the past 12 months  · Creatinine result < 2  Recent Outpatient Visits

## 2019-11-12 NOTE — TELEPHONE ENCOUNTER
Review pended refill request as it does not fall under a protocol.     Last Rx: 08/11/19  LOV: 02/28/19

## 2019-12-04 NOTE — ASSESSMENT & PLAN NOTE
Worsening right knee instability, swelling. Gait instability due to the swelling. She is using a brace at this point. X-ray of the knee in the past did show patellofemoral arthritis. Given instability most likely needs an MRI of the knee.   Referral for

## 2019-12-04 NOTE — PROGRESS NOTES
HPI:    Patient ID: Macie Willoughby is a 48year old female. Per [facility] oral surgery      Assesment/Plan-  Patient has been continuing to go to PT appointments and follow ups.  While symptoms are improving, with the patient's MRI sent prior to appointmen The problem has been gradually improving since onset. The problem is controlled. Associated symptoms include anxiety. Pertinent negatives include no peripheral edema. There are no associated agents to hypertension.  Risk factors for coronary artery disease Valsartan-hydroCHLOROthiazide 80-12.5 MG Oral Tab Take 1 tablet by mouth daily. 90 tablet 3   • amphetamine-dextroamphetamine (ADDERALL) 15 MG Oral Tab Take 1 tablet (15 mg total) by mouth 2 (two) times daily.  60 tablet 0   • metFORMIN HCl  MG Oral T Comment:migraine  Prochlorperazine        JAIRO    Comment:Compazine  Prochlorperazine Ed*    JAIRO  Sumatriptan             FATIGUE    Comment:Left side weakness             Left side weakness             Other reaction(s): weakness      12/03/19  205 Hypothyroidism     Thyroid function test has been stable on levothyroxine at 125 mcg daily. Continue on the same dose of medication and recheck labs as ordered.          Vitamin D deficiency - Primary    Relevant Orders    VITAMIN D, 25-HYDROXY    Vitamin made which has helped but she continues to have severe pain in the right jaw. Arthrocentesis has been planned and possibly Botox injections. Procedure to be completed under general anesthesia. EKG has been ordered.            Other Visit Diagnoses     Vi

## 2019-12-04 NOTE — ASSESSMENT & PLAN NOTE
Diabetes has been stable, well controlled on metformin 500 mg twice daily which she has tolerated well. She is overdue for labs–ordered today. Renal functions have been stable on lisinopril and lipid panel has been stable.   She has been requested to comp

## 2019-12-04 NOTE — ASSESSMENT & PLAN NOTE
Thyroid function test has been stable on levothyroxine at 125 mcg daily. Continue on the same dose of medication and recheck labs as ordered.

## 2019-12-04 NOTE — ASSESSMENT & PLAN NOTE
Blood pressure 150/90, pulse 76, resp. rate 16, height 5' 2\" (1.575 m), weight 192 lb (87.1 kg), not currently breastfeeding. Blood pressure quite elevated even upon recheck. Advised to discontinue the lisinopril.   Started instead on valsartan hydrochlo

## 2019-12-04 NOTE — ASSESSMENT & PLAN NOTE
Severe jaw pain with history of bruxism. She has been seen by oral maxillofacial surgery at [place] and has had a bite block made which has helped but she continues to have severe pain in the right jaw.   Arthrocentesis has been planned and possibly Botox i

## 2019-12-04 NOTE — PATIENT INSTRUCTIONS
Problem List Items Addressed This Visit        Unprioritized    Essential hypertension     Blood pressure 150/90, pulse 76, resp. rate 16, height 5' 2\" (1.575 m), weight 192 lb (87.1 kg), not currently breastfeeding.   Blood pressure quite elevated even up jaw.  Arthrocentesis has been planned and possibly Botox injections. Procedure to be completed under general anesthesia. EKG has been ordered.          Vitamin B12 deficiency    Relevant Orders    VITAMIN B12    Vitamin D deficiency - Primary    Relevant

## 2019-12-10 NOTE — TELEPHONE ENCOUNTER
Current Outpatient Medications   Medication Sig Dispense Refill   • metFORMIN HCl  MG Oral Tablet 24 Hr TAKE 1 TABLET(500 MG) BY MOUTH TWICE DAILY WITH MEALS 180 tablet 1

## 2019-12-11 NOTE — TELEPHONE ENCOUNTER
Please review; protocol failed.      Requested Prescriptions     Pending Prescriptions Disp Refills   • metFORMIN HCl  MG Oral Tablet 24 Hr 180 tablet 1     Sig: TAKE 1 TABLET(500 MG) BY MOUTH TWICE DAILY WITH MEALS         Recent Visits  Date Type Pr

## 2019-12-23 NOTE — TELEPHONE ENCOUNTER
Spoke with the patient who reports she needs a refill on the Valsartan-hydroCHLOROthiazide. Per chart review the patient has refills available for the [place] [facility] Patient made aware and voiced understanding.

## 2019-12-23 NOTE — TELEPHONE ENCOUNTER
Left message to call back. Transfer to triage    What medications is she needing.   Refill should be for both the Metoprolol and Valsartan/HTZ

## 2019-12-23 NOTE — TELEPHONE ENCOUNTER
Patient was scheduled Saturday, December 28th for BP check. Due to no provider on that day, appt rescheduled to Jan 7th. Please will need BP medication refill before appt/please phone in medication to pharmacy and advise patient once complete. Thank you.

## 2019-12-28 NOTE — TELEPHONE ENCOUNTER
Controlled medication pending for review. Please change to phone in, fax, or print script if not being sent electronically.     Last Rx: 5-23-19 # 60 + 2  LOV: 12-3-19    Requested Prescriptions     Pending Prescriptions Disp Refills   • traMADol HCl 50 MG

## 2020-01-08 NOTE — PROGRESS NOTES
Pt. João Weaver in for a BP check. Name  verified. First machine reading was 137/98, P 70. I verified meds with her. I had pt. Wait 5 min as she did mention she was rushed getting to her appmt. Manual reading in left arm was 126/74, P 72.   I told doctor

## 2020-01-17 PROBLEM — E11.9 TYPE 2 DIABETES MELLITUS WITHOUT COMPLICATION, WITHOUT LONG-TERM CURRENT USE OF INSULIN (HCC): Status: ACTIVE | Noted: 2017-01-24

## 2020-01-17 NOTE — PROGRESS NOTES
Gabbi Martinez is a 48year old female. HPI:     HPI     Diabetic Eye Exam     Diabetes characteristics include controlled with diet and Type 2. Duration of 2 years. Number of years diabetic 2. Number of years on pills 2.   Number of years on insulin but thinks she has glc   • Cancer Brother 52        KIDNEY   • Cancer Brother    • Cancer Sister    • Cancer Sister    • Cancer Maternal Grandmother    • Ovarian Cancer Maternal Grandmother 61   • Diabetes Sister    • Diabetes Sister    • Obesity Sister Glucose Monitoring Suppl (TRUE METRIX METER) w/Device Does not apply Kit FPD  0   • TRUE METRIX BLOOD GLUCOSE TEST In Vitro Strip TEST ONCE D  0   • TRUEPLUS LANCETS 33G Does not apply Misc TEST ONCE D  0   • Biotin 5000 MCG Oral Cap Take 1 tablet by mouth Slit Lamp Exam       Right Left    Lids/Lashes Normal Normal    Conjunctiva/Sclera Nasal/temp pinguecula Nasal/temp pinguecula    Cornea Clear Clear    Anterior Chamber Deep and quiet Deep and quiet    Iris Normal Normal    Lens Clear Clear    Vitreo

## 2020-01-22 NOTE — PROGRESS NOTES
NURSING INTAKE COMMENTS: Patient presents with:  Consult: right knee pain,getting worse over the years ,at times shifts outward like dislocation ,swelling and pain it can reach a 10/10       HPI: This 48year old female presents today with complaints of ri skin.     • amphetamine-dextroamphetamine 15 MG Oral Tab Take 15 mg by mouth. • ergocalciferol 1.25 MG (79571 UT) Oral Cap Take 1 capsule (50,000 Units total) by mouth once a week.  12 capsule 1   • traMADol HCl 50 MG Oral Tab 1 tab po bid prn 60 tablet JAIRO    Comment:Compazine  Prochlorperazine Ed*    JAIRO  Sumatriptan             FATIGUE    Comment:Left side weakness             Left side weakness             Other reaction(s): weakness  Family History   Problem Relation Age of Onset   • Hea dizziness, memory loss  PSYCHIATRIC: denies Hx of depression, anxiety, other psychiatric disorders  HEMATOLOGIC: denies blood clots, anemia, blood clotting disorders, blood transfusion  ENDOCRINE: denies autoimmune disease, thyroid issues, or diabetes  ALL COMPOSITION:   CATEGORY b- There are scattered areas of fibroglandular density.    FINDINGS: Benign appearing calcifications are seen in the left breast.  The parenchyma pattern is stable with no new suspicious asymmetry, mass, architectural distortion, or soft tissue calcification.        Labs:  Lab Results   Component Value Date    WBC 6.0 12/31/2019    HGB 13.0 12/31/2019    .0 12/31/2019      Lab Results   Component Value Date     (H) 12/31/2019    BUN 17 12/31/2019    CREATSERUM 0.75 12/31/

## 2020-02-07 NOTE — TELEPHONE ENCOUNTER
Controlled medication pending for review. Please change to phone in, fax, or print script if not being sent electronically.     Last Rx: 12/3/19 #60  Recent Visits  Date Type Provider Dept   12/03/19 Office Visit Flaquita Hernandez MD [facility]   02/2

## 2020-02-07 NOTE — TELEPHONE ENCOUNTER
From: Gabbi Martinez  To:  Tom Saucedo MD  Sent: 2/6/2020 8:33 PM CST  Subject: Ani De Luna,   During my last visit on Dec. 3/ 2019, I received from you a prescription for my Aderall since the electronic system was not worki

## 2020-02-08 NOTE — PROGRESS NOTES
Pt seen for CTHS    PRELIMINARY SCORE=0  PC=469/94    [facility] labs as follows:12/31/19  TC=160  HDL=45  LDL=81  GN=470  GLUCOSE=117 (diabetic)    Pt to be  scheduled for free PV, including POLI    All results and risk factors discussed with patient; all q

## 2020-02-08 NOTE — TELEPHONE ENCOUNTER
Pt was called to inform her that her medication has been sent to her pharmacy she can call us back if she has questions or concerns.  Thanks

## 2020-04-01 NOTE — TELEPHONE ENCOUNTER
Patient calling and states that she sent a [employer] message on 3/23/20 regarding her adderall refill but until now there is no response. Pharmacy verified. Advised that will send the message to Dr Jose Holliday.   Reported that her BP is been stable, readings ranges

## 2020-04-03 NOTE — TELEPHONE ENCOUNTER
I called pharmacy and they said that her insurance does not cover it and could not tell me what was covered.

## 2020-04-06 NOTE — TELEPHONE ENCOUNTER
I called pt. And she said she has been paying out of pocket for this and has informed the pharmacy many times not to bother us with the prior auth.

## 2020-04-07 NOTE — TELEPHONE ENCOUNTER
Refill request    Current Outpatient Medications   Medication Sig Dispense Refill   • Metoprolol Succinate ER 25 MG Oral Tablet 24 Hr TAKE 1 TABLET BY MOUTH AT BEDTIME 90 tablet 1

## 2020-05-06 NOTE — TELEPHONE ENCOUNTER
Action Requested: Summary for Provider     []  Critical Lab, Recommendations Needed  [x] Need Additional Advice  []   FYI    [x]   Need Orders  [] Need Medications Sent to Pharmacy  []  Other     SUMMARY: Pt only wanting virtual visit with Dr Justice Mcclendon who bowman

## 2020-05-07 PROBLEM — Z20.822 SUSPECTED COVID-19 VIRUS INFECTION: Status: ACTIVE | Noted: 2020-05-07

## 2020-05-07 NOTE — ASSESSMENT & PLAN NOTE
4-day history of fevers, shaking chills, headaches, sore throat and worsening cough with some shortness of breath that has been worse over the past 3 days. History of exposure to to family members who were diagnosed with COVID infection.   Patient herself

## 2020-05-07 NOTE — PATIENT INSTRUCTIONS
Problem List Items Addressed This Visit        Unprioritized    Suspected COVID-19 virus infection - Primary     4-day history of fevers, shaking chills, headaches, sore throat and worsening cough with some shortness of breath that has been worse over the

## 2020-05-07 NOTE — PROGRESS NOTES
HPI:    Patient ID: Katia Rowland is a 48year old female.   Telehealth outside of [facility] [address] Verbal Consent   I conducted a telehealth visit with Katia Rowland today, 05/07/20, which was completed using two-way, real-time interactive audio and sputum. Associated symptoms include chills, ear congestion, headaches, nasal congestion, postnasal drip, shortness of breath and sweats. 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     . Nothing agg tiZANidine HCl 2 MG Oral Tab Take 1 tablet (2 mg total) by mouth nightly. 90 tablet 1   • Meloxicam 15 MG Oral Tab Take 1 tablet (15 mg total) by mouth daily.  30 tablet 3   • Levothyroxine Sodium 125 MCG Oral Tab TAKE 1 TABLET(125 MCG) BY MOUTH BEFORE MICKEY She has no wheezes. Abdominal: Soft. She exhibits no distension. There is no tenderness. Musculoskeletal:         General: No edema. Neurological: She is alert and oriented to person, place, and time. No cranial nerve deficit. Skin: No rash noted. Imaging & Referrals:  None       WK#[id number]

## 2020-05-13 NOTE — TELEPHONE ENCOUNTER
Forwarding to triage covid \"call back\" folder so her progress can be monitored. Since she is going to ER today, setting the first follow up to be tomorrow 5/14/20.

## 2020-05-13 NOTE — TELEPHONE ENCOUNTER
Please reply to pool: EM RN TRIAGE    Action Requested: Summary for Provider     []  Critical Lab, Recommendations Needed  [] Need Additional Advice  [x]   FYI    []   Need Orders  [] Need Medications Sent to Pharmacy  []  Other     SUMMARY: Advised 911 an

## 2020-05-14 NOTE — TELEPHONE ENCOUNTER
Patient admitted at [facility] 5/13/20. Dr Fair=FYI    Procedures  - from Last 3 Months  The patient is currently admitted. The information in this section might not be complete until the patient is discharged.       Please reply to robert: JONO HUMMEL

## 2020-05-15 NOTE — TELEPHONE ENCOUNTER
Patient is asking for medication to help with the cough. Please advise on ER instructions to stop BP meds for a \"few days\"    Patient was instructed to stop her bp meds for a few days. Blood pressure today was 97/67.   Having all over body aches, especi

## 2020-05-15 NOTE — TELEPHONE ENCOUNTER
Patient was contacted. Complained of abdominal pain, was advised to follow-up in the emergency room.

## 2020-05-16 NOTE — TELEPHONE ENCOUNTER
Spoke with the patient who reports she was never advised to go to the ER yesterday 5/15/20. Patient reports she is still very weak, but confirms she is still ambulating. She reports she is not having abdominal pain now.  Patient reports she had a temperatur

## 2020-05-18 NOTE — TELEPHONE ENCOUNTER
Patient did not go to ER. Presently being monitored for COVID19 by [facility] home monitoring team as of 5/15/2020. Left message for patient to call back regarding abdominal pain that she was advised to seek ER treatment for.

## 2020-05-19 PROBLEM — U07.1 COVID-19 VIRUS INFECTION: Status: ACTIVE | Noted: 2020-05-07

## 2020-05-19 NOTE — PROGRESS NOTES
Home Monitoring Condition Update    Covid19+ test date:  5/8/2020      Consent Verification:  Assessment Completed With: Patient  HIPAA Verified?   Yes    COVID-19 HOME MONITORING 5/19/2020   Temperature 97.5   Reading From Axillary   SPO2 95   Pulse 68 Heart rate 68  B/P  102/74 and O2 Saturation of 95%    She has not been taking her blood pressure medication and is wondering when she should? Also How will she know if her pneumonia is gone?     Patient advised to continue monitoring temperature and pulse

## 2020-05-19 NOTE — PROGRESS NOTES
From [facility] Monitoring     The patient stated is improving slowly. She continues to cough, fatigue, weakness, nausea,   Headache, shoulder and leg pain. She was diagnosed with pneumonia on 5/13/2020.    Today her Axillary temperature is 97.5  Heart

## 2020-05-19 NOTE — TELEPHONE ENCOUNTER
Virtual Telephone Check-In    Jacinta Can verbally consents to a Virtual/Telephone Check-In visit on 05/19/20. Patient understands and accepts financial responsibility for any deductible, co-insurance and/or co-pays associated with this service.

## 2020-05-19 NOTE — ASSESSMENT & PLAN NOTE
Patient was seen at [facility] for hypotension, abdominal pain, fevers with positive COVID-19 infection. CT chest was suggestive of COVID-19 pneumonia. CT abdomen and pelvis with distal sigmoid, rectal wall thickening suggesting slight inflammation.   She has

## 2020-05-21 NOTE — PROGRESS NOTES
From Home Monitoring Program    The patient continues to have slight shortness of breath, cough, weakness and fatigue. She has pain to her back, legs and a headache.   Yesterday she had 4 watery brown stools,  I stated should follow the BRAT diet and dri

## 2020-05-21 NOTE — PROGRESS NOTES
Home Monitoring Condition Update    Covid19+ test date: 5/8/2020      Consent Verification:  Assessment Completed With: Patient  HIPAA Verified?   Yes    COVID-19 HOME MONITORING 5/21/2020   Temperature 96.7   Reading From Axillary   SPO2 97   Pulse 68 Pedialyte. Encouraged to push fluids. Appointment scheduled for 5/22/2020.   Message to PCP  Patient advised to continue monitoring temperature and pulse at least twice a day and record, rest and stay hydrated--to call the PCP with worsening symptoms,

## 2020-05-22 NOTE — PROGRESS NOTES
HPI:    Patient ID: Macie Wliloughby is a 50year old female. Telehealth Verbal Consent   I conducted a telehealth visit with Macie Willoughby today, 05/22/20, which was completed using two-way, real-time interactive audio and video communication.  This ha feeling much better. Nausea and vomiting have resolved. She does not have diarrhea but has soft stools about 3/day. She is on cefuroxime. She complains of anxiety with insomnia, unable to sleep through the night, associated with palpitations.   Mychal allergies. Neurological: Negative. Negative for headaches. Hematological: Negative. Psychiatric/Behavioral: Negative.              Current Outpatient Medications   Medication Sig Dispense Refill   • fluticasone-salmeterol (ADVAIR DISKUS) 100-50 MCG/ Allergies:  Morphine                PAIN    Comment:migraine  Prochlorperazine        JAIRO    Comment:Compazine  Prochlorperazine Ed*    JAIRO  Sumatriptan             FATIGUE    Comment:Left side weakness             Left side weakness refills. Refills have been provided and will reassess needs. COVID-19 virus infection - Primary     Patient's abdominal pain has improved significantly.   Her CT abdomen and pelvis done at diagnosis on May 8/9 was suggestive of inflammation in

## 2020-05-22 NOTE — PATIENT INSTRUCTIONS
Problem List Items Addressed This Visit        Unprioritized    Anxiety and depression     Chronic depressive disorder with anxiety from all her current issues. Remains agitated off and on.   She admits to having some confusion earlier on with this infecti

## 2020-05-22 NOTE — ASSESSMENT & PLAN NOTE
Chronic depressive disorder with anxiety from all her current issues. Remains agitated off and on. She admits to having some confusion earlier on with this infection but feels much better otherwise. Is able to think clearly at this time.   No hallucinati

## 2020-05-22 NOTE — ASSESSMENT & PLAN NOTE
Patient's abdominal pain has improved significantly. Her CT abdomen and pelvis done at diagnosis on May 8/9 was suggestive of inflammation in the distal sigmoid, rectal wall showing thickening.   At the time of having the test she was having a diarrheal is

## 2020-05-26 NOTE — TELEPHONE ENCOUNTER
Per Home Monitoring Program          The patient stated she is feeling better.    Her blood pressures are starting to rise     Date                     Blood pressure     5/23/20 Sat                       116/72  5/24/20  Sun                     119/82  Mon

## 2020-05-26 NOTE — PROGRESS NOTES
Home Monitoring Condition Update    Covid19+ test date: 5/8/2020    Consent Verification:  Assessment Completed With: Patient  HIPAA Verified?   Yes    COVID-19 HOME MONITORING 5/26/2020   Temperature 97.2   Reading From Axillary   SPO2 96   Pulse 75   Pu medications. She continues to have light headaches during the day; She experiences Nausea,  lightheadedness and dizziness  1 to 2 times a day.   She has increased her activity as walking around her house, cooking and doing chores-she has been more weak

## 2020-05-27 NOTE — TELEPHONE ENCOUNTER
Spoke with patient ( verified) and relayed Dr. Erin Sanchez message below--patient verbalizes understanding and agreement. No further questions/concerns at this time.

## 2020-05-27 NOTE — TELEPHONE ENCOUNTER
All her current symptoms are expected during recovery from COVID-19 infection/pneumonia. Continue supportive care. We will follow-up with the patient on 528.

## 2020-05-27 NOTE — PROGRESS NOTES
Home Monitoring Condition Update    Covid19+ test date: 5/08/2020      Consent Verification:  Assessment Completed With: Patient  HIPAA Verified?   Yes    COVID-19 HOME MONITORING 5/27/2020   Temperature 98.5   Reading From Axillary   SPO2 96   Pulse 74 understanding and agreement. Patient advised to continue monitoring temperature and pulse at least twice a day and record, rest, stay hydrated, BRAT diet with small, frequent meals.  Advised to take Tylenol q 6 hrs prn fever/pain, not to exceed 3000 mg/d

## 2020-05-28 PROBLEM — R53.1 LEFT-SIDED WEAKNESS: Status: ACTIVE | Noted: 2020-05-28

## 2020-05-28 NOTE — ED INITIAL ASSESSMENT (HPI)
Pt sent in by PCP for stoke rule out. COVID pt had a follow up call today and told her MD that for 3 days she has had pain/weakness to left arm and leg with dizziness, HA and nausea. Reports \"cold\" left foot.  RR even and nonlabored, speaking in full sent

## 2020-05-29 NOTE — PROGRESS NOTES
Pharmacy Note: Dietary Supplement Discontinuation Per Policy    Vitamin B 12 capsules has been discontinued on [employer] per policy. This supplement may be restarted upon discharge using the medication reconciliation process.     Thank you,   Joseph Cos

## 2020-05-29 NOTE — PHYSICAL THERAPY NOTE
PHYSICAL THERAPY EVALUATION - INPATIENT     Room Number: 510/510-A  Evaluation Date: 5/29/2020  Type of Evaluation: Initial   Physician Order: PT Eval and Treat    Presenting Problem: +COVID-19, L arm and LE weakness  Reason for Therapy: Mobility Dysfun Form.  Research supports that patients with this level of impairment may benefit from HHPT to optimize functional outcomes. Patient will benefit from continued IP PT services to address these deficits in preparation for discharge.     DISCHARGE RECOMMEND One level  Stairs to Enter : 20  Railing: Yes          Lives With: Daughter;Spouse  Drives: No  Patient Owned Equipment: None  Patient Regularly Uses: None    Prior Level of [place]: Ind in ADL's and IADL's with exception of driving.      SUBJECTIVE  \ sitting on the side of the bed?: A Little   How much help from another person does the patient currently need. ..   -   Moving to and from a bed to a chair (including a wheelchair)?: A Little   -   Need to walk in hospital room?: [address],[place] [address]

## 2020-05-29 NOTE — PATIENT INSTRUCTIONS
Problem List Items Addressed This Visit        Unprioritized    COVID-19 virus infection - Primary     Patient has had a complicated COVID infection with pneumonia, abdominal pain, diarrhea and vomiting. Initially diagnosed–first week of May.   She has bee

## 2020-05-29 NOTE — PLAN OF CARE
Problem: Diabetes/Glucose Control  Goal: Glucose maintained within prescribed range  Description  INTERVENTIONS:  - Monitor Blood Glucose as ordered  - Assess for signs and symptoms of hyperglycemia and hypoglycemia  - Administer ordered medications to m weakness/numbness in left arm/leg. PT/OT and speech evaluated patient and patient did well.

## 2020-05-29 NOTE — CONSULTS
Spoke with Dr. Rad Botello. Possible right thalamic, right parietal lobe stroke based on symptoms. Patient is not a TPA candidate based on time of onset, not a neuroradiology intervention candidate. Stroke order sets implemented.   Recently diagnosed with CO

## 2020-05-29 NOTE — PROGRESS NOTES
ADMISSION NOTE    50year old female diagnosed with covid about 2 weeks ago presents with L sided paresthesias and weakness. Available medical records partially reviewed. Dictation to follow.     Lee Ann Gentile M.D.  5/28/2020

## 2020-05-29 NOTE — PAYOR COMM NOTE
--------------  ADMISSION REVIEW     Payor: Tristan Terrazas #:  [id number]  Authorization Number: [id number]    Admit date: 5/28/20  Admit time: 2325       Admitting Physician: Ross Hernandez MD  Attending Physi Pt with L sided weakness and tingling                                      Covid Pos 2 weeks ago                                      Sent in by PMD for R/O CVA after folow up tele health visit today APPENDECTOMY     • APPENDECTOMY     • CHOLECYSTECTOMY     • COLONOSCOPY  2010    per NG   • COLONOSCOPY  2014   • HYSTERECTOMY      total   • HYSTERECTOMY     • NEEDLE BIOPSY LEFT      Benign   •   9182,[id number]   • OTHER SURGICAL following components:       Result Value    D-Dimer 0.99 (*)     All other components within normal limits   BASIC METABOLIC PANEL (8) - Normal   PROTHROMBIN TIME (PT) - Normal   PTT, ACTIVATED - Normal   TROPONIN I - Normal   CBC WITH DIFFERENTIAL WITH PL Admission  Date Reviewed: 5/28/2020    None                   Signed by Eyad Calvillo MD on 5/28/2020  9:44 PM            H&P - H&P Note      H&P filed by Regulo Askew MD at 5/29/2020  8:06 AM / Draft: Not Electronically Signed     Author:  Chey Hi was recommended that she present to the emergency room for evaluation of her left-sided numbness and weakness.   In the emergency room, a CT scan of the brain was performed which was an essentially normal exam.  Chemistries were normal.  Her troponin was le  at 48 of a CVA. Her father had coronary artery disease and blood clot, he  at 76years of age, he also apparently had diabetes. SOCIAL HISTORY:  She does not smoke, drink alcohol, or use drugs. She is  and lives with her .     RE well.  PSYCHIATRIC:  Her mood and affect were pleasant, cooperative, and quite anxious. BACK:  There was no costovertebral angle tenderness noted. LABORATORY DATA:  Labs were previously mentioned. ASSESSMENT AND PLAN:    1.    Left-sided numbness and clinical status and proposed treatment plan were discussed with her. All questions were answered, and she agreed with the plan of care as outlined above.     Dictated By Radha Estrada MD  d: 05/29/2020 06:44:54  t: 05/29/2020 07:27:39  Job [id number]

## 2020-05-29 NOTE — OCCUPATIONAL THERAPY NOTE
OCCUPATIONAL THERAPY EVALUATION - INPATIENT     Room Number: 510/510-A  Evaluation Date: 5/29/2020  Type of Evaluation: Initial  Presenting Problem: pain/weakness --LUE and LLE    Physician Order: IP Consult to Occupational Therapy  Reason for Therapy: A strength. The patient's Approx Degree of Impairment: 32.79% has been calculated based on documentation in the [facility] '6 clicks' Inpatient Daily Activity Short Form. Research supports that patients with this level of impairment may benefit from home. Daughter;Spouse                      Drives: No  Patient Regularly Uses: None      Prior Level of [place]: Ind in ADL's and IADL's with exception of driving. SUBJECTIVE  \"This new pain I have. .it's kind of like before, but I haven't had it in tho NT  Toileting: SPV  Upper Extremity Dressing: NT  Lower Extremity Dressing: NT    Education Provided: role of OT, activity promotion   Patient End of Session: Up in chair;Needs met;Call light within reach;RN aware of session/findings    OT Goals  Patients

## 2020-05-29 NOTE — PROGRESS NOTES
HPI:    Patient ID: Macie Willoughby is a 50year old female.   Telehealth outside of [facility] [address] Verbal Consent   I conducted a telehealth visit with Macie Willoughby today, 05/28/20, which was completed using two-way, real-time interactive audio and inability to move the toes of the left foot. She did not have this in the past.  The right foot was normal.  She is able to ambulate well. She does have some dizziness and headaches that have been on and off. Diagnosis of COVID infection in early May. (2 mg total) by mouth nightly. 90 tablet 1   • Dicyclomine HCl 10 MG Oral Cap Take 1 capsule (10 mg total) by mouth 3 (three) times daily.  (Patient not taking: Reported on 1/22/2020 ) 90 capsule 2   • OMEPRAZOLE 20 MG Oral Tab EC TAKE 1 TABLET BY MOUTH ALBERTA muscle tone. Coordination normal.   Patient unable to flex the great toe of the left foot. Dorsiflexion of the left foot seems weak on video evaluation.  strength left hand seems weak. Slight swelling/numbness noted in the left hand.    Skin: No rash the emergency room and explained need for testing. Visit duration of 24 minutes. Return in about 2 days (around 5/30/2020), or if symptoms worsen or fail to improve.     PT UNDERSTANDS AND AGREES TO FOLLOW DIRECTIONS AND ADVICE    No orders o

## 2020-05-29 NOTE — SLP NOTE
ADULT SWALLOWING EVALUATION    ASSESSMENT    ASSESSMENT/OVERALL IMPRESSION:  PT COVID-19 POSITIVE. CONTACT AND DROPLET ISOLATION PPE REQUIRED. THIS THERAPIST WORE GOWN, GLOVES, FACE SHIELD, AND DROPLET/N95 RESPIRATOR MASK.  HANDS SANITIZED/WASHED UPON ENTRA time  Treatment Plan/Recommendations: No further inpatient SLP service warranted  Discharge Recommendations/Plan: Undetermined    HISTORY   MEDICAL HISTORY  Reason for Referral: R/O aspiration    Problem List  Principal Problem:    Left-sided weakness  Act by (CST): General Ana Luisa MD on 5/29/2020 at 9:02 AM       Finalized by (CST): General Ana Luisa MD on 5/29/2020 at 9:07 AM            CT  =====  CONCLUSION: Normal examination.     Dictated by (CST): Zoey Gaston MD on 5/28/2020 at 8:37 PM       Finalized

## 2020-05-29 NOTE — ED PROVIDER NOTES
Patient Seen in: [facility] AND [facility] Emergency Department      History   Patient presents with:  Dizziness    Stated Complaint:     HPI    53-year-old female with history of thyroid disorder, fibromyalgia, anxiety, and recent COVID-19 infection diagnosed 2 complaint:   Other systems are as noted in HPI. Constitutional and vital signs reviewed. All other systems reviewed and negative except as noted above.     Physical Exam     ED Triage Vitals   BP 05/28/20 1838 138/85   Pulse 05/28/20 1838 75   Resp 05 Please view results for these tests on the individual orders.    URINALYSIS WITH CULTURE REFLEX   COMP METABOLIC PANEL (14)   HEMOGLOBIN A1C   LIPID PANEL   MAGNESIUM   RAINBOW DRAW BLUE   RAINBOW DRAW GOLD   RAPID SARS-COV-2 BY PCR   CBC W/ DIFFERENTIA

## 2020-05-29 NOTE — BH PROGRESS NOTE
Patient is alert, oriented x 4. Left upper and lower side 4/5 and with numbness. Right side is 5/5. For MRI of the brain scheduled today.

## 2020-05-29 NOTE — ASSESSMENT & PLAN NOTE
Patient has had a complicated COVID infection with pneumonia, abdominal pain, diarrhea and vomiting. Initially diagnosed–first week of May.   She has been to the emergency room twice at [facility].  She has been monitored over the past few weeks and h

## 2020-05-29 NOTE — CONSULTS
Consult dictated. Patient evaluated earlier today with a 4-day history of left arm leg numbness weakness. No other neurological symptoms. She has risk factors of hypertension, diabetes, sleep apnea, COVID state.   Neurological examination does reveal april

## 2020-05-29 NOTE — H&P
[facility]    PATIENT'S NAME: Gregory Clements PHYSICIAN: Brea Vásquez MD   PATIENT ACCOUNT#:   [id number]    LOCATION:  [address] [place] [address] RECORD #:   [id number]       YOB: 1965  ADMISSION DATE:       05/2 evaluation and monitoring to the cardiac telemetry unit.     PAST MEDICAL HISTORY:  Anxiety; cholelithiasis status post cholecystectomy; COVID-19 diagnosed May 10 of this year; endometriosis status post total hysterectomy; gastroesophageal reflux; fibromyal urination currently. She has had no hematuria and has finished her antibiotic course at this time. She does report poor appetite and fatigue since her diagnosis of COVID. Denies any fever or chills. She also denies any chest discomfort.   She did have s migraine; however, Erica is known to be a thrombogenic virus and concern for possible CVA is also very real.  MRI of the brain is ordered as well as echo and carotid ultrasound.   The ultrasound was done in the emergency room and revealed no evidence of any

## 2020-05-30 NOTE — PLAN OF CARE
Problem: Diabetes/Glucose Control  Goal: Glucose maintained within prescribed range  Description  INTERVENTIONS:  - Monitor Blood Glucose as ordered  - Assess for signs and symptoms of hyperglycemia and hypoglycemia  - Administer ordered medications to m patient's plan of care  Description  Interventions:  - What would you like us to know as we care for you?  I live at home with my  and daughter; they both have COVID  - Provide timely, complete, and accurate information to patient/family  - [employer]

## 2020-05-30 NOTE — CM/SW NOTE
MDO for [facility] Eval:    Patient admitted for left arm/leg numbness and weakness. MRI negative for stroke, repeat MRI planned for today. PT recommended Home PT    Discussed above w/patient.   She lives with her  and daughter in a 2nd floor apartment w

## 2020-05-30 NOTE — CONSULTS
[facility]    PATIENT'S NAME: Loyda Ava   ATTENDING PHYSICIAN: Oma Redd MD   CONSULTING PHYSICIAN: Jose Chang MD   PATIENT ACCOUNT#:   [id number]    LOCATION:  [address] [place] [address] [address] #:   [id number]       DATE OF BIRTH:  [birth year] PHYSICAL EXAMINATION:    GENERAL:  She is pleasant and cooperative. HEENT:  Pupils are reactive to light. Visual fields are full.   NEUROLOGIC:  Cranial nerves III through VII and IX through XII are normal.  Strength in the arms and legs does revea sign off. Please call with questions.       Dictated By Katie Mitchell MD  d: 05/29/2020 15:35:45  t: 05/29/2020 16:29:08  [facility] [id number]  [facility]/

## 2020-05-30 NOTE — HOME CARE LIAISON
Received referral from Almas WRIGHT Unable to accept at this time due to staffing in pt's area. Notified [facility] WRIGHT

## 2020-05-30 NOTE — CONSULTS
[facility] ID CONSULT NOTE    Si Khoa Patient Status:  Inpatient    1965 MRN [id number]   Location [address] [facility] Attending Howie Dumas MD   Hosp Day # 2 PCP Lena Reynolds MD       [facility] for [facility] SERVICES, [facility] screening 3/12/2012    per NG   • Obesity, unspecified 1990   • TMJ (temporomandibular joint disorder) 2010    \"TMJ\"   • Tonsillitis     tonsillectomy 1979   • Unspecified sleep apnea 2007     Past Surgical History:   Procedure Laterality Date   • APPEND ELLIPTA) 200-25 MCG/INH inhaler 1 puff, 1 puff, Inhalation, Daily  •  Levothyroxine Sodium tab 125 mcg, 125 mcg, Oral, Before breakfast  •  lidocaine-menthol 4-1 % 1 patch, 1 patch, Transdermal, Daily PRN  •  acetaminophen (TYLENOL) tab 650 mg, 650 mg, Ora sputum. GASTROINTESTINAL:  No anorexia, nausea, vomiting or diarrhea. No abdominal pain or blood. GENITOURINARY:  No Burning on urination. NEUROLOGICAL:  No headache, dizziness, syncope, paralysis, ataxia, numbness or tingling in the extremities.   [facility] for eval as recommended by PCP. On admission, no fever, no leukocytosis. CMP WNL. UA negative. Rapid COVID remains positive. XR w/o acute disease. No O2 requirements. CT, MRI and carotid and LE dopplers negative.  Neurology evaluated without further wkup in

## 2020-05-30 NOTE — PROGRESS NOTES
[facility]    Progress Note    Manasa Cruz Patient Status:  Inpatient    1965 MRN [id number]   Location [facility] Attending Eduardo Tavera MD   Hosp Day # 2 PCP Robert Page MD       Subjective:   Manasa Cruz com Date: 5/29/2020  CONCLUSION:  1. No acute intracranial process by noncontrast MR technique. 2. Nonspecific minimal signal abnormalities in the white matter, slightly intervally increased in conspicuity since 2011, and likely within expectations for age.  Jadyn Gilliland -------------------------- Sinus Rhythm Low voltage in limb leads.  ABNORMAL When compared with ECG of 12/31/2019 10:55:54 No significant changes have occurred Electronically signed on 05/28/2020 at 20:24 by Ariana Delgado MD    • LORazepam  1 mg Intravenou MD  05/30/20

## 2020-05-30 NOTE — PLAN OF CARE
Problem: Diabetes/Glucose Control  Goal: Glucose maintained within prescribed range  Description  INTERVENTIONS:  - Monitor Blood Glucose as ordered  - Assess for signs and symptoms of hyperglycemia and hypoglycemia  - Administer ordered medications to m patency with patient fatigue and changes in neurological status  - Encourage and assist patient to increase activity and self care with guidance from PT/OT  - Encourage visually impaired, hearing impaired and aphasic patients to use assistive/communication

## 2020-05-31 NOTE — PROGRESS NOTES
[facility] HOSP - [facility]    Progress Note    Dominique Carrasquillo Patient Status:  Inpatient    1965 MRN [id number]   Location [facility] Attending Kendy Bryson MD   Hosp Day # 3 PCP Nicole Kaur MD       Subjective:   Dominique Carrasquillo sti (cpt=70551)    Result Date: 5/29/2020  CONCLUSION:  1. No acute intracranial process by noncontrast MR technique.   2. Nonspecific minimal signal abnormalities in the white matter, slightly intervally increased in conspicuity since 2011, and likely within e patch Transdermal Daily   • gabapentin  200 mg Oral BID   • Fluticasone Furoate-Vilanterol  1 puff Inhalation Daily   • Levothyroxine Sodium  125 mcg Oral Before breakfast   • Fluticasone Propionate  1 spray Each Nare Daily   • atorvastatin  10 mg Oral Nig

## 2020-06-01 NOTE — PAYOR COMM NOTE
--------------  CONTINUED STAY REVIEW-------REQUESTING ADDITIONAL DAY 5/29, 5/30, AND 5/31    Payor: Tristan Terrazas #:  [id number]  Authorization Number: [id number]    Admit date: 5/28/20  Admit time: 2325    Admittin   BILT 0.2 12/31/2019     TP 7.3 12/31/2019     AST 15 12/31/2019     ALT 20 12/31/2019     PTT 32.2 05/28/2020     INR 0.90 05/28/2020     T4F 1.6 05/29/2020     TSH 0.300 (L) 05/29/2020     DDIMER 0.99 (H) 05/28/2020     MG 2.0 05/29/2020     TROP <0.045 CONCLUSION:  1. No acute disease in the chest.  Heart size upper limits of normal to mildly prominent, the pulmonary vascularity is normal.  Mild tortuosity ascending and descending thoracic aorta.     Dictated by (CST): Boom Stevens MD on 5/28/2020 at 8: >35 min spent with patient. > 50% time was spent counseling patient, discussing plan of care, discussing labs and imaging findings. Spoke with consultant.  All questions answered.         Miriam Kenny MD  05/29/20 5/30  Maria R Jimenez MD   Phys   TSH 0.300 (L) 05/29/2020     TSH 0.308 (L) 05/29/2020     DDIMER 0.99 (H) 05/28/2020     MG 2.0 05/29/2020     TROP <0.045 05/28/2020     B12 1,890 (H) 12/31/2019         Ct Brain Or Head (98860)     Result Date: 5/28/2020  CONCLUSION:           Normal e Xr Chest Ap Portable  (cpt=71045)     Result Date: 5/28/2020  CONCLUSION:            1.  No acute disease in the chest.  Heart size upper limits of normal to mildly prominent, the pulmonary vascularity is normal.  Mild tortuosity ascending and descending th - appreciate ID recs     HTN  -hold home meds due to low blood pressure     Dyslipidemia  -continue statin      DM2  - SSI   - accuchecks AC and HS     Hypothyroidism  - repeat TSH  - continue home meds            Quality:  · DVT Prophylaxis: heparin  · CO    05/31/2020     CO2 30.0 05/31/2020      (H) 05/31/2020     CA 8.6 05/31/2020     ALB 3.5 12/31/2019     ALKPHO 93 12/31/2019     BILT 0.2 12/31/2019     TP 7.3 12/31/2019     AST 15 12/31/2019     ALT 20 12/31/2019     PTT 32.2 05/28/2020 CONCLUSION:            1. No hemodynamically significant stenosis of the right or left internal carotid artery. 2. Antegrade flow in the vertebral arteries.    Remote - PS  Dictated by (CST): Zuleika Gomez MD on 5/28/2020 at 10:02 PM     Finalized by (CST - check MRI of the cervical/thoracic/lumbar spine to rule out radiculopathy  - most likely may have sciatica and cervical radiculopathy  - lidoderm patch, flexeril and gabapentin  - await MRI results  - follow up with pain service as an outpatient once COV Gadoterate Meglumine (DOTAREM) 7.5 MMOL/15ML injection 15 mL     Date Action Dose Route User    6/1/2020 1224 Given 15 mL Intravenous Regis Flax      Heparin Sodium (Porcine) 5000 UNIT/ML injection 5,000 Units     Date Action Dose Route User    6/1/20

## 2020-06-01 NOTE — CM/SW NOTE
Per nursing rounds, pt will not require [facility] services at discharge. Plan: Home when medically stable, no needs at this time. SW/CM to remain available for support and/or discharge planning.      [address], [place] [id number]

## 2020-06-01 NOTE — PLAN OF CARE
Patient was provided with discharge instructions, education, and follow up information. Printed prescriptions were given to patient.  Patient verbalizes understanding of follow up information, specifically medications prescribed, most common side effects, t temperature, glucose, and sodium.  Initiate appropriate interventions as ordered  Outcome: Adequate for Discharge  Goal: Achieves maximal functionality and self care  Description  INTERVENTIONS  - Monitor swallowing and airway patency with patient fatigue a

## 2020-06-01 NOTE — PLAN OF CARE
Patient alert and oriented this evening with VSS, stating she has intermittent, shooting pain in left arm . Patient now on norco, gabapentin, flexiril. RA. Up with self. Call light within reaching distance. Will cont to monitor.    Problem: Diabetes/Glucose Encourage visually impaired, hearing impaired and aphasic patients to use assistive/communication devices  Outcome: Progressing     Problem: Patient Centered Care  Goal: Patient preferences are identified and integrated in the patient's plan of care  Descr

## 2020-06-01 NOTE — PLAN OF CARE
VS stable. NSR on tele. On room air. Isolation prec. C/o pain on left upper and left lower extremity. Norco given with some relief. Patient now in MRI for testing.  Antianxiety given prior to procedure per patient request.    Problem: Diabetes/Glucose Contr visually impaired, hearing impaired and aphasic patients to use assistive/communication devices  Outcome: Progressing     Problem: Patient Centered Care  Goal: Patient preferences are identified and integrated in the patient's plan of care  Description  In

## 2020-06-01 NOTE — DISCHARGE SUMMARY
[facility]    Discharge Summary    Katia Rowland Patient Status:  Inpatient    1965 MRN [id number]   Location [facility] Attending Tanvi Jimenez MD   Hosp Day # 4 PCP Esperanza Duggan MD     Date of Admission: 2020 printed  - tramadol discontinued as patient states it doesn't work   - follow up with pain service as an outpatient once COVID-19 infection is recovered.    - mri of the brain negative for acute stroke but shows chronic ischemic changes       COVID-19 virus HCl  MG Oral Tablet 24 Hr  TAKE 1 TABLET(500 MG) BY MOUTH TWICE DAILY WITH MEALS    tiZANidine HCl 2 MG Oral Tab  Take 1 tablet (2 mg total) by mouth nightly.     Dicyclomine HCl 10 MG Oral Cap  Take 1 capsule (10 mg total) by mouth 3 (three) times da (three) times daily.    Quantity:  90 capsule  Refills:  2     Omeprazole 20 MG Tbec  What changed:    · how much to take  · when to take this  · reasons to take this      TAKE 1 TABLET BY MOUTH EVERY DAY   Quantity:  90 tablet  Refills:  1        CONTINUE 0     TRUEplus Lancets 33G Misc      TEST ONCE D   Refills:  0        STOP taking these medications    lidocaine 5 % Ptch  Commonly known as:  LIDODERM  Replaced by:  lidocaine-menthol 4-1 % Ptch        traMADol HCl 50 MG Tabs  Commonly known as:  Lucien Fregoso

## 2020-06-02 NOTE — PAYOR COMM NOTE
--------------  DISCHARGE REVIEW    Payor: Tristan Terrazas #:  [id number]  Authorization Number: [id number]    Admit date: 5/28/20  Admit time:  2325  Discharge Date: 6/1/2020  6:01 PM     Admitting Physician: Muriel Barnhart General: No acute distress. Respiratory: Clear to auscultation bilaterally. No wheezes. No rhonchi. Cardiovascular: S1, S2. Regular rate and rhythm. No murmurs, rubs or gallops. Abdomen: Soft, nontender, nondistended. Positive bowel sounds.  No rebo 6 (six) hours as needed. Home Meds - Unchanged    fluticasone-salmeterol (ADVAIR DISKUS) 100-50 MCG/DOSE Inhalation Aerosol Powder, Breath Activated  Inhale 1 puff into the lungs 2 (two) times daily.     Levothyroxine Sodium 125 MCG Oral Tab  TAKE 1 TA NEURONTIN      Take 2 capsules (200 mg total) by mouth 2 (two) times daily. Quantity:  60 capsule  Refills:  0     HYDROcodone-acetaminophen 5-325 MG Tabs  Commonly known as:  NORCO      Take 1 tablet by mouth every 6 (six) hours as needed.    Quantity: as: GLUCOPHAGE-XR      TAKE 1 TABLET(500 MG) BY MOUTH TWICE DAILY WITH MEALS   Quantity:  180 tablet  Refills:  1     Metoprolol Succinate ER 25 MG Tb24  Commonly known as:   Toprol XL      TAKE 1 TABLET BY MOUTH AT BEDTIME   Quantity:  90 tablet  Refills:

## 2020-06-02 NOTE — TELEPHONE ENCOUNTER
Patient requesting refill for her Klonopin. She states that she has spoken to Dr. Alan Soria regarding this medication recently. Please advise.  Thank you

## 2020-06-02 NOTE — TELEPHONE ENCOUNTER
Patient discharged 6/1/2020, +COVID-19. Pt will need a f/u call and possible televisit or virtual visit. HFU appt recommended by (6/3/2020—within 2 days of discharge) as pt is a high risk for readmission.  Please discuss with PCP and follow up with patient ac

## 2020-06-02 NOTE — PROGRESS NOTES
Initial Post Discharge Follow Up   Discharge Date: 6/1/20  Contact Date: 6/2/2020    Consent Verification:  Assessment Completed With: Patient  HIPAA Verified?   Yes    Discharge Dx:  Acute lumbar stenosis      General:   • How have you been since your [employer] capsule 0   • HYDROcodone-acetaminophen 5-325 MG Oral Tab Take 1 tablet by mouth every 6 (six) hours as needed.  28 tablet 0   • fluticasone-salmeterol (ADVAIR DISKUS) 100-50 MCG/DOSE Inhalation Aerosol Powder, Breath Activated Inhale 1 puff into the lungs leaving the hospital? yes  • (NCM) If a new medication was prescribed:    o Was the new medication’s purpose & side effects reviewed? yes  o Do you have any questions about your new medication?  No  • Did you  your discharge medications when you left of tylenol. She verbalized understanding. Patient states that her only issue is the pain. She states that she is waiting for her daughter to  the new prescriptions. Med review completed. Patient requesting refill for Klonopin.  NCM sent TE to PCP off

## 2020-06-02 NOTE — TELEPHONE ENCOUNTER
This is being sent to you without review by the Triage staff due to the high call volumes created by the COVID-19 virus, per the email sent by Dr. Isaac Sullivan on 3/19/20. Thank you for your support.     Centralized Nurse Triage Team
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
## 2020-06-03 NOTE — TELEPHONE ENCOUNTER
Opening today placed pt in schedule for video visit   Called pt when pt calls back inform that she has been placed on schedule for a video visit with PCP Marie

## 2020-06-05 NOTE — TELEPHONE ENCOUNTER
Patient was recently discharge from [facility]. She had a telemedicine visit with PCP on 6/3/2020. Would you like her to be enrolled in COVID+ monitoring program? Please advise. Please respond to Vianey Mistry.

## 2020-06-09 NOTE — PROGRESS NOTES
LMTCB for home monitoring day #1 condition update--2nd attempt (test date 5/28/2020). [facility] contact information provided. PCP would like patient monitored d/t complicated Hx--was in [facility] 5/28-5/29/2020.

## 2020-06-10 NOTE — PROGRESS NOTES
LMOMTCB for home monitoring day 1 condition update. NCM contact information provided.         Unable to reach TE sent to PCP

## 2020-06-10 NOTE — TELEPHONE ENCOUNTER
· Pt called back and re-enrolled in Home Monitoring. · How frequently would you like us to call the pt and when do you want to follow up?     · Also pt was looking for her AVS from her last video visit 6/3 and states that you recommended cymbalta as part

## 2020-06-10 NOTE — TELEPHONE ENCOUNTER
· Multiple attempts x 3 to reach pt for Home Monitoring  regarding +COVID with no answer and no return call.     · Please advise if you would like us to continue trying to reach the pt or if ok to discharge from home monitoring      Please reply to Peng Hollingsworth

## 2020-06-11 NOTE — TELEPHONE ENCOUNTER
For constipation–okay to take 200 mg twice daily of the docusate sodium. But needs to drink plenty of fluids to keep hydrated. If this does not help add MiraLAX 17 g with a glass of water daily in addition.   If this does not help–will need to give me a c

## 2020-06-11 NOTE — TELEPHONE ENCOUNTER
Called the pt to give her the information from Dr. Kelsi Vazquez. She is having a bad day- she is having pain- she doesn't want to take the Norco because of the constipation so she took flexeril instead.  Advised of the notes from Dr. Kelsi Vazquez and she will get th

## 2020-06-11 NOTE — TELEPHONE ENCOUNTER
Noted- will call pt after 1(per pt request)  with her instructions ans advise that we will be calling once a week for 3 weeks.

## 2020-06-17 NOTE — TELEPHONE ENCOUNTER
Spoke to patient for day 2 home monitoring (although we are calling her weekly per PCP request until 7/1). Patient mentioned PCP asked her to take Docusate Sodium (otc) 200 mg BID and patient could not find 200 mg caps, she found 240 mg caps.   She was con

## 2020-06-17 NOTE — PROGRESS NOTES
Home Monitoring Condition Update    Covid19+ test date: 5/28/20      Consent Verification:  Assessment Completed With: Patient  HIPAA Verified?   Yes    COVID-19 HOME MONITORING 6/17/2020   Temperature 97.7   Reading From Mouth   SPO2 98   Pulse 79   Puls time.    Patient advised to inform their Employee Health department or Manager when they have tested positive for COVID-19.       The patient was also directed to continue to isolate away from other household members when possible and stay completely isolat

## 2020-06-19 NOTE — TELEPHONE ENCOUNTER
That is fine to take. But gradually cut down to 1 a day in about 1 week and then take only as needed.

## 2020-06-22 NOTE — PROGRESS NOTES
HPI:    Patient ID: Dominique Carrasquillo is a 50year old female.   Telehealth outside of [facility] [address] Verbal Consent   I conducted a telehealth visit with Dominique Carrasquillo today, 06/22/20, which was completed using two-way, real-time interactive audio and weakness  Active Problems:    COVID-19 virus infection    This is a 50year old female who has a pmh of COVID-19 diagnosed May 10 who was admitted for myalgias, headaches and worsening left arm and leg pain with tingling and numbness.  MRI of the brain was 30 capsule 2   • tiZANidine HCl 2 MG Oral Tab Take 1 tablet (2 mg total) by mouth nightly. 90 tablet 1   • clonazePAM 0.5 MG Oral Tab Take 1 tablet (0.5 mg total) by mouth nightly as needed (insomnia).  30 tablet 3   • lidocaine-menthol 4-1 % External Patch METRIX BLOOD GLUCOSE TEST) In Vitro Strip Test once daily.   Dx: E11.9 100 strip 3   • Blood Glucose Monitoring Suppl (TRUE METRIX METER) w/Device Does not apply Kit FPD  0   • TRUE METRIX BLOOD GLUCOSE TEST In Vitro Strip TEST ONCE D  0   • TRUEPLUS LANCET significant physical abnormalities. Continue on clonazepam, tizanidine, hydrocodone if necessary, Cymbalta. Will consider starting on Lyrica if necessary. Continue to monitor.          COVID-19 virus infection - Primary     Patient with recent history of

## 2020-06-22 NOTE — PATIENT INSTRUCTIONS
Problem List Items Addressed This Visit        Unprioritized    Chronic pain syndrome     Chronic pain syndrome/fibromyalgia. Currently aggravated after the COVID-19 infection as well as anxiety. follow-up. No significant physical abnormalities.   Continue

## 2020-06-22 NOTE — TELEPHONE ENCOUNTER
Dr. Danielle Doss: patient requesting refills. Last televisit 6/3/20.    She will plan to f/u with you in August.     Requested Prescriptions     Pending Prescriptions Disp Refills   • cyclobenzaprine 10 MG Oral Tab 30 tablet 0     Sig: Take 1 tablet (10 mg to

## 2020-06-22 NOTE — ASSESSMENT & PLAN NOTE
Most likely related to chronic pain. No specific central nervous system abnormalities like a stroke, spinal abnormality noted. Patient has had chronic pain which probably has aggravated at this time due to the COVID-19 infection.   She does have lumbar sp

## 2020-06-22 NOTE — ASSESSMENT & PLAN NOTE
Chronic pain syndrome/fibromyalgia. Currently aggravated after the COVID-19 infection as well as anxiety. follow-up. No significant physical abnormalities. Continue on clonazepam, tizanidine, hydrocodone if necessary, Cymbalta.   Will consider starting on

## 2020-06-22 NOTE — ASSESSMENT & PLAN NOTE
Patient with recent history of a complicated [id number] infection. Had pneumonia, abdominal pain, diarrhea and vomiting initially. Diagnosed around first week of May.   She has been to the emergency room x2 at [facility] and more recently to [facility]

## 2020-06-24 NOTE — PROGRESS NOTES
Home Monitoring Condition Update    Covid19+ test date: 5/28/20  Contact date: 6/24/20      Consent Verification:  Assessment Completed With: Patient  HIPAA Verified?   Yes    COVID-19 HOME MONITORING 6/24/2020   Temperature 96.6   Reading From Axillary having some SOB with activity that is improving and still feeling fatigued. Patient has complaints of back pain since being in the hospital. She is in contact with Dr. Fátima Lowery office to get medication refilled.  Patient advised to check temperature and pul

## 2020-07-01 NOTE — PROGRESS NOTES
Home Monitoring Condition Update        Consent Verification:  Assessment Completed With: Patient  HIPAA Verified?   Yes    COVID-19 HOME MONITORING 7/1/2020   Temperature 96.1   Reading From Axillary   SPO2 97   Pulse 74   Pulse taken from Pulse Oximeter household members when possible and stay completely isolated from the general public 3 days after symptoms resolve or 10 days total (whichever is longer). Advised patient If symptoms worsen/ medical emergency to call 911.       Time spent this encounter re

## 2020-07-02 NOTE — PROGRESS NOTES
Patient was called once/week for 3 weeks per PCP request (see TE 6/10). Last outreach call was 7/1. Patient is removed from [facility] [address] to PCP.

## 2020-07-05 NOTE — PROGRESS NOTES
Amaury Garcia is a 50year old female. HPI:   See answers to questions above.      Current Outpatient Medications   Medication Sig Dispense Refill   • Nitrofurantoin Monohyd Macro 100 MG Oral Cap Take one capsule 2 times daily for 7 days 14 capsule 0 mouth 3 (three) times daily as needed.  ) 90 capsule 2   • OMEPRAZOLE 20 MG Oral Tab EC TAKE 1 TABLET BY MOUTH EVERY DAY (Patient taking differently: Take 20 mg by mouth daily as needed.  ) 90 tablet 1   • Glucose Blood (TRUE METRIX BLOOD GLUCOSE TEST) In 50 AFTER CHEMO, SISTERS X 2    • Breast Cancer Sister 45   • Glaucoma Sister         patient not sure but thinks she has glc   • Cancer Brother 52        KIDNEY   • Cancer Brother    • Cancer Sister    • Cancer Sister    • Cancer Maternal Grandmother    •

## 2020-08-04 PROBLEM — Z86.16 HISTORY OF 2019 NOVEL CORONAVIRUS DISEASE (COVID-19): Status: ACTIVE | Noted: 2020-05-07

## 2020-08-04 PROBLEM — L65.0 TELOGEN EFFLUVIUM: Status: ACTIVE | Noted: 2020-08-04

## 2020-08-04 PROBLEM — F98.8 ATTENTION DEFICIT DISORDER (ADD) WITHOUT HYPERACTIVITY: Status: ACTIVE | Noted: 2020-08-04

## 2020-08-04 PROBLEM — M25.50 POLYARTHRALGIA: Status: ACTIVE | Noted: 2020-08-04

## 2020-08-04 NOTE — ASSESSMENT & PLAN NOTE
Most likely induced by recent infection. Gentle shampoo. Vitamin B complex, vitamin C, vitamin D supplementation as directed. Recheck labs as directed. Local application of fluocinolone oil as directed. We will follow-up if not better.

## 2020-08-04 NOTE — ASSESSMENT & PLAN NOTE
Patient has been off her Adderall due to recent COVID-19 infection and significant fatigue and weakness associated with it. Has had increased difficulty with focus, completion of tasks. She is advised to restart her Adderall at 15 mg daily.   Will reasses

## 2020-08-04 NOTE — PROGRESS NOTES
HPI:    Patient ID: Jayden Chavis is a 50year old female.   Telehealth outside of [facility] [address] Verbal Consent   I conducted a telehealth visit with Jayden Chavis today, 08/04/20, which was completed using two-way, real-time interactive audio and Associated symptoms include arthralgias (joint pains-small joints hands and wrist especially but also other joints -elbows and shoulders as well as knees. not fibromyalgia pain), fatigue and myalgias.  Pertinent negatives include no congestion, diaphoresis, Negative. Neurological: Negative. Negative for weakness and headaches. Hematological: Negative. Psychiatric/Behavioral: Positive for behavioral problems, confusion and decreased concentration (difficulty concentrating after d/c of adderall). 1 capsule (10 mg total) by mouth 3 (three) times daily.  (Patient taking differently: Take 10 mg by mouth 3 (three) times daily as needed.  ) 90 capsule 2   • OMEPRAZOLE 20 MG Oral Tab EC TAKE 1 TABLET BY MOUTH EVERY DAY (Patient taking differently: Take 20 She has a normal mood and affect. Her behavior is normal. Judgment and thought content normal.   Vitals reviewed.              ASSESSMENT/PLAN:     Problem List Items Addressed This Visit        Unprioritized    Vitamin D deficiency - Primary     On high-do infection. Gentle shampoo. Vitamin B complex, vitamin C, vitamin D supplementation as directed. Recheck labs as directed. Local application of fluocinolone oil as directed. We will follow-up if not better.          Relevant Orders    CBC WITH DIFFERENT

## 2020-08-04 NOTE — ASSESSMENT & PLAN NOTE
History of recent COVID-19 infection with pneumonia, abdominal pain, diarrhea and vomiting initially diagnosed on May 4. Gradually worsened and she has been seen by the emergency room x2. She was positive for COVID-19 infection even on May 28.   Her labs,

## 2020-08-04 NOTE — ASSESSMENT & PLAN NOTE
Total joint pains involving the fingers, wrist, elbows and shoulders. She does have chronic fibromyalgia-like aches and pains which is separate from her current presentation. We will need to rule out development of an autoimmune disease process.   We will

## 2020-08-05 NOTE — PATIENT INSTRUCTIONS
Problem List Items Addressed This Visit        Unprioritized    Attention deficit disorder (ADD) without hyperactivity     Patient has been off her Adderall due to recent COVID-19 infection and significant fatigue and weakness associated with it.   Has had Vitamin D deficiency - Primary     On high-dose vitamin D supplementation once a week.   Will recheck labs         Relevant Orders    VITAMIN B12    VITAMIN D, 25-HYDROXY

## 2020-08-21 NOTE — TELEPHONE ENCOUNTER
UA and culture and sensitivity to be dropped off first.  Start on Cipro 500 mg twice daily for 5 days after dropping off sample

## 2020-08-21 NOTE — TELEPHONE ENCOUNTER
MyChart msg sent to call office. ----- Message from Jaden Gray sent at 8/21/2020  2:12 PM CDT -----  Regarding: Other  Contact: [phone]  Dear Dr. Justo Garcia,  Since yesterday I am having the UTI symptoms. Burning and pain when I urinate.  Can you p

## 2020-08-21 NOTE — TELEPHONE ENCOUNTER
From: Lorraine Brady  To: Yecenia Roca MD  Sent: 8/21/2020 2:12 PM CDT  Subject: Other    Dear Dr. Mia Benito,  Since yesterday I am having the UTI symptoms. Burning and pain when I urinate. Can you prescribe something? Or should I make an e-appointment?  Ple

## 2020-08-21 NOTE — TELEPHONE ENCOUNTER
Action Requested: Summary for Provider     []  Critical Lab, Recommendations Needed  [] Need Additional Advice  []   FYI    []   Need Orders  [] Need Medications Sent to Pharmacy  []  Other     SUMMARY:  The patient does not want to come in.   She i

## 2020-08-21 NOTE — TELEPHONE ENCOUNTER
Verified pt name and . Reviewed doctor's recommendations as noted below. Provided pt with CSS phone number to schedule lab appt. Pt states she will have all lab work done tomorrow, doesn't have transportation today.  Pt states she will start antibiotic a

## 2020-08-22 NOTE — TELEPHONE ENCOUNTER
Tramadol has significant interaction with Cymbalta which she is on. We will need to consider placing on an alternate medication for pain or change to Cymbalta, please check with the patient.   I have not sent in the prescription for tramadol

## 2020-08-24 NOTE — TELEPHONE ENCOUNTER
Current Outpatient Medications:   •  traMADol HCl 50 MG Oral Tab, TAKE 1 TABLET BY MOUTH TWICE DAILY AS NEEDED, Disp: 60 tablet, Rfl: 0

## 2020-08-24 NOTE — TELEPHONE ENCOUNTER
Prior authorization for Tramadol completed w/ [employer] on cover my meds Key: [id number], turn around time 1-5 days.

## 2020-08-25 NOTE — TELEPHONE ENCOUNTER
Prior authorization has been denied for Tramadol. Patients plan states medication is not covered due to not meeting criteria. Insurance will only cover a 7 day supply.

## 2020-08-27 PROBLEM — N30.90 CYSTITIS: Status: ACTIVE | Noted: 2020-08-27

## 2020-08-28 NOTE — PROGRESS NOTES
HPI:    Patient ID: Dede Hair is a 50year old female.   Telehealth outside of [facility] [address] Verbal Consent   I conducted a telehealth visit with Dede Hair today, 08/27/20, which was completed using two-way, real-time interactive audio and symptoms. The treatment provided no relief. Joint Pain   This is a new problem. The current episode started 1 to 4 weeks ago. The problem occurs constantly.  The problem has been gradually worsening (Pain, swelling, stiffness, small joints of the hands, w HCl  MG Oral Tablet 24 Hr TAKE 1 TABLET(500 MG) BY MOUTH TWICE DAILY WITH MEALS 180 tablet 1   • gabapentin 100 MG Oral Cap Take 2 capsules (200 mg total) by mouth 2 (two) times daily.  60 capsule 5   • DULoxetine HCl (CYMBALTA) 30 MG Oral Cap DR Part TEST ONCE D  0   • TRUEPLUS LANCETS 33G Does not apply Misc TEST ONCE D  0   • Cranberry 250 MG Oral Tab Take 2 tablets by mouth daily as needed.        Allergies:  Morphine                PAIN    Comment:migraine  Prochlorperazine        JAIRO    Comment INTERNAL    Attention deficit disorder (ADD) without hyperactivity    Cystitis     Recurrent bladder dscomfort.   No uti per c/s on 8/22  Will adv to f/u with urology         Relevant Orders    UROLOGY - INTERNAL        Visit duration of 30 min  No follow-u

## 2020-09-14 NOTE — ASSESSMENT & PLAN NOTE
multiple joint pains involving the fingers, wrist, elbows and shoulders. She does have chronic fibromyalgia-like aches and pains which is separate from her current presentation. Gradually improving at this time,will f/u on labs.   Tramadol for severe pain

## 2020-09-25 PROBLEM — M77.01 MEDIAL EPICONDYLITIS OF ELBOW, RIGHT: Status: ACTIVE | Noted: 2020-09-25

## 2020-09-26 NOTE — ASSESSMENT & PLAN NOTE
Thyroid function tests have been stable on levothyroxine at 125 mcg daily. We will follow-up with recheck labs.

## 2020-09-26 NOTE — ASSESSMENT & PLAN NOTE
This has been supplemented in the past, recheck labs with the next blood draw. Advised to continue on over-the-counter vitamin D 2000 units daily.

## 2020-09-26 NOTE — ASSESSMENT & PLAN NOTE
Blood pressure 136/88, pulse 92, resp. rate 20, height 5' 2\" (1.575 m), weight 193 lb (87.5 kg), not currently breastfeeding. Blood pressure looks stable, continue on valsartan hydrochlorothiazide 80/12 0.5-1 tablet daily and metoprolol 25 mg daily.   Kid

## 2020-09-26 NOTE — ASSESSMENT & PLAN NOTE
History of recent COVID-19 infection with pneumonia, abdominal pain, diarrhea, vomiting diagnosed on May 4. Had multiple episodes of hospitalization after that at least 3 due to pneumonia, abdominal pain and diarrhea.   She did have an MRI brain, C-spine a

## 2020-09-26 NOTE — PROGRESS NOTES
HPI:    Patient ID: Estelle Hernandes is a 50year old female.    q    Diabetes  She presents for her follow-up diabetic visit. She has type 2 diabetes mellitus. Her disease course has been stable.  Hypoglycemia symptoms include confusion, mood changes and ne mood. The patient is nervous/anxious. Current Outpatient Medications   Medication Sig Dispense Refill   • tiZANidine HCl 2 MG Oral Tab Take 2 mg by mouth nightly as needed.        • ALPRAZolam 0.5 MG Oral Tab Take 1 tablet (0.5 mg total) by mout Tab EC TAKE 1 TABLET BY MOUTH EVERY DAY (Patient taking differently: Take 20 mg by mouth daily as needed.  ) 90 tablet 1   • Glucose Blood (TRUE METRIX BLOOD GLUCOSE TEST) In Vitro Strip Test once daily.   Dx: E11.9 100 strip 3   • Blood Glucose Monitoring distal pulses. No murmur heard. Pulmonary/Chest: Effort normal and breath sounds normal. She has no wheezes. She has no rales. She exhibits no tenderness. Abdominal: Soft. Bowel sounds are normal. She exhibits no distension and no mass.  There is no ab metoprolol. Lipid panel has been stable, difficult to start on medication due to severe muscle pains. Continue to monitor.          Relevant Orders    HEMOGLOBIN A1C (Completed)    COMP METABOLIC PANEL (14)    CBC WITH DIFFERENTIAL WITH PLATELET    HEMOGL strep as directed. Prednisone low-dose as directed. Follow-up with orthopedics–Dr. Jayde Pierce, [phone].          Relevant Medications    HYDROcodone-acetaminophen 5-325 MG Oral Tab    predniSONE 5 MG Oral Tab    Other Relevant Orders    ORTHOPEDIC - INTE

## 2020-09-26 NOTE — PATIENT INSTRUCTIONS
Problem List Items Addressed This Visit        Unprioritized    Attention deficit disorder (ADD) without hyperactivity     Chronic ADD with worsening focus, attention that patient feels worsened after the COVID-19 infection.   She is on Adderall at 15 mg da MG Oral Tab    predniSONE 5 MG Oral Tab    Other Relevant Orders    ORTHOPEDIC - INTERNAL    Type 2 diabetes mellitus without complication, without long-term current use of insulin ([facility] [address] - Primary     Patient has been on metformin, tolerated well.   Yasmanioglo

## 2020-09-26 NOTE — ASSESSMENT & PLAN NOTE
Patient with recent worsening pain in the medial elbow right side without any history of injury. No palpable fluid collection. Range of movement seems normal though associated with pain. He is strep as directed. Prednisone low-dose as directed.   Follow

## 2020-09-26 NOTE — ASSESSMENT & PLAN NOTE
Patient has been on metformin, tolerated well. Hemoglobin A1c looks stable at 7.1 but is higher than what it has been for this patient in the past.  Strict diet control and daily exercise for about 15 to 20 minutes discussed.   Diabetic eye exam was comple

## 2020-09-26 NOTE — ASSESSMENT & PLAN NOTE
Chronic ADD with worsening focus, attention that patient feels worsened after the COVID-19 infection. She is on Adderall at 15 mg daily. Will consider increasing this to 30 mg daily.

## 2020-09-30 NOTE — PROCEDURES
With paitent's consent, I injected pt's L shoulder and L trochanteric bursa with 1ml lidocaine 1 % and 1 ml kenalog 40. It was done under sterile technique using iodine and alcohol swabs and ethyl chloride was used as an anaesthetic spray.  Pt.  tolerated i

## 2020-09-30 NOTE — PATIENT INSTRUCTIONS
You were seen today for joint and muscle pain   Symptoms are from fibromyalgia  Your L shoulder and L hip were injected with cortisone  Try amitriptyline at night  Cont norco, tylenol, meloxicam as needed  Follow up in 3 mos   Can always try PT too

## 2020-09-30 NOTE — PROGRESS NOTES
Chao Waldrop is a 50year old female who presents for Patient presents with:  Knee Pain: left is worse  Hip Pain: left is worse  Neck Pain  Back Pain  Shoulder Pain: left  .    HPI:   CC: joint pain  Consult: referred by PCP Dr. Jose Holliday    This is a 51 yo Outpatient Medications   Medication Sig Dispense Refill   • ergocalciferol 1.25 MG (85592 UT) Oral Cap Take 50,000 Units by mouth once a week. • tiZANidine HCl 2 MG Oral Tab Take 2 mg by mouth nightly as needed.        • Valsartan-hydroCHLOROthiazide HCl 10 MG Oral Cap Take 1 capsule (10 mg total) by mouth 3 (three) times daily.  (Patient taking differently: Take 10 mg by mouth 3 (three) times daily as needed.  ) 90 capsule 2   • OMEPRAZOLE 20 MG Oral Tab EC TAKE 1 TABLET BY MOUTH EVERY DAY (Patient isabella (Other) Father         Rheumatoid Arthritis   • Heart Attack Mother         MI   • Heart Disorder Mother    • Hypertension Mother    • Heart Attack Other         MI AT 45/MI AT 50 AFTER CHEMO, SISTERS X 2    • Breast Cancer Sister 45   • Glaucoma Sister work of breathing  ABDOMEN:  soft NT/ND, +BS, no HSM  SKIN: No rashes or skin lesions.  No nail findings  MSK:  TTP in cervical paraspinal region throughout her spine, in her trapezius region, shoulders and hip regions  Cervical spine: FROM  Hands: no synov pain syndrome  - No evidence of synovitis on exam, she is very tender to touch throughout her body  - Blood work showed negative KEILA, ESR, CRP, RF and CCP  - She is tried gabapentin, Cymbalta and Lyrica in the past which were either not effective or had si

## 2020-11-03 NOTE — TELEPHONE ENCOUNTER
Patient is following up on her request for her medication.          HYDROcodone-acetaminophen 5-325 MG Oral Tab 60 tablet 0 9/25/2020

## 2020-11-19 NOTE — TELEPHONE ENCOUNTER
Current Outpatient Medications   Medication Sig Dispense Refill   • gabapentin 100 MG Oral Cap Take 2 capsules (200 mg total) by mouth 2 (two) times daily.  60 capsule 5

## 2020-11-23 NOTE — ASSESSMENT & PLAN NOTE
Vision is currently on metformin and has tolerated medications well. She is due for recheck labs which have been ordered. Referral for the eye exam has been provided. Feet seem okay at this time.   Referral for podiatry evaluation after labs are obtained Additional Notes: Patient consent was obtained to proceed with the visit and recommended plan of care after discussion of all risks and benefits, including the risks of COVID-19 exposure. Detail Level: Simple

## 2020-12-13 NOTE — ED NOTES
Pt A&OX4, pt denies dizziness, lightheadedness, cp, sob, n/v. Discharge paperwork and prescriptions discussed with pt, pt verbally understands them.  Pt discharged ambulatory with steady gait no distress noted

## 2020-12-13 NOTE — ED NOTES
Pt to ED for c/o lower abd and low back pain with urinary frequency, dysuria. Pt states she does have frequent UTIs for which she is usually prescribed Cipro. Pt states \"this time I just can't get rid of it\". Pt also endorses nausea and dizziness.

## 2020-12-13 NOTE — ED INITIAL ASSESSMENT (HPI)
Patient c/o urinary frequency, urgency and dysuria since yesterday. States left flank pain and left lower abd pain. Denies fevers.

## 2020-12-13 NOTE — ED PROVIDER NOTES
Patient Seen in: [facility] AND [facility] Emergency Department    History   Patient presents with:  Abdomen/Flank Pain  Urinary Symptoms    Stated Complaint: uti    HPI    Lorraine Key is a 50year old female who presents with chief complaint of left flank p 6 (six) hours as needed for Nausea. Ciprofloxacin HCl 500 MG Oral Tab,  Take 1 tablet (500 mg total) by mouth 2 (two) times daily for 5 days. gabapentin 100 MG Oral Cap,  Take 2 capsules (200 mg total) by mouth 2 (two) times daily.    ALPRAZolam 0.5 MG MOUTH EVERY DAY  Patient taking differently: Take 20 mg by mouth daily as needed. Glucose Blood (TRUE METRIX BLOOD GLUCOSE TEST) In Vitro Strip,  Test once daily.   Dx: E11.9   Blood Glucose Monitoring Suppl (TRUE METRIX METER) w/Device Does not apply K cm (5' 2\")   Wt 89.4 kg   LMP  (LMP Unknown)   SpO2 97%   BMI 36.03 kg/m²     PULSE OX within normal limits on room air as interpreted by this provider. Physical Exam    Constitutional: The patient is cooperative.  Appears well-developed and well-no DIFFERENTIAL WITH PLATELET    Narrative: The following orders were created for panel order CBC WITH DIFFERENTIAL WITH PLATELET.   Procedure                               Abnormality         Status                     --------- tract infection without hematuria, site unspecified  (primary encounter diagnosis)  Elevated blood pressure reading    Disposition:  Discharge    Follow-up:  Lo Parra MD  [address] [place] [address]  [phone]    Schedule an appointment

## 2020-12-22 NOTE — TELEPHONE ENCOUNTER
Current Outpatient Medications   Medication Sig Dispense Refill   • Butalbital-APAP-Caffeine -40 MG Oral Tab TAKE 1 TABLET BY MOUTH EVERY 6 HOURS AS NEEDED FOR PAIN 30 tablet 1

## 2021-01-05 NOTE — TELEPHONE ENCOUNTER
Patient is requesting refill of Adderall. Last refill was a 90 day panel given on 8/27/2020  Last office visit 9/25/2020    90 day panel pended.

## 2021-01-05 NOTE — TELEPHONE ENCOUNTER
From: Maria Alejandra Elizabeth  To: Cristina Gtz MD  Sent: 1/4/2021 7:18 PM CST  Subject: Non-Urgent Red Bob Dr. Lm Chester,  I was at the ER on Dec 13 and had some lab tests done.  I have an schedule appointment to see you on Feb 3, the question is i

## 2021-01-08 NOTE — TELEPHONE ENCOUNTER
Current Outpatient Medications   Medication Sig Dispense Refill   • amphetamine-dextroamphetamine (ADDERALL) 15 MG Oral Tab Take 1 tablet (15 mg total) by mouth 2 (two) times daily.  60 tablet 0   • [START ON 2/4/2021] amphetamine-dextroamphetamine (ADDERAL

## 2021-01-28 NOTE — TELEPHONE ENCOUNTER
Patient calling reports needs refill on Adderall, she pays out of pocket     States she asked [facility] for refill and it has not been processed  was told do \" not  have the refill \"   Also needs refill on Norco       Asked about upcoming labs : Provided

## 2021-03-31 NOTE — ED PROVIDER NOTES
Patient Seen in: [facility] AND [facility] Emergency Department      History   Patient presents with:  GI Bleeding    Stated Complaint: GI - blood in stool/ rash on face and chest    HPI/Subjective:   HPI    79-year-old with a reported history of diverticulosis Alcohol/week: 0.0 standard drinks    Drug use: No             Review of Systems    Positive for stated complaint: GI - blood in stool/ rash on face and chest  Other systems are as noted in HPI. Constitutional and vital signs reviewed.       All other syste the following components:    Alkaline Phosphatase 141 (*)     All other components within normal limits   URINALYSIS WITH CULTURE REFLEX - Abnormal; Notable for the following components:    Leukocyte Esterase Urine Small (*)     WBC Urine 6-10 (*)     Bact FINDINGS:  LIVER: There is diffuse fatty infiltration of the liver. Upper limits of normal in size of the right lobe at 17.1 cm in longitudinal length. No focal mass. GALLBLADDER: Status post cholecystectomy.  BILIARY: No visible dilatation or calcificati Findings suggest a pan colitis with abnormal thickening and a decompressed appearance to the ascending, transverse and descending colon. Abnormal thickening of the sigmoid colon. Recommend colonoscopy.   Mild inflammatory reaction in the left pericolic fa Refills: 0    !! - Potential duplicate medications found. Please discuss with provider.

## 2021-03-31 NOTE — TELEPHONE ENCOUNTER
Action Requested: Summary for Provider     []  Critical Lab, Recommendations Needed  [] Need Additional Advice  []   FYI    []   Need Orders  [] Need Medications Sent to Pharmacy  []  Other     SUMMARY: Per protocol advised ER now.   Patient states she will

## 2021-03-31 NOTE — ED INITIAL ASSESSMENT (HPI)
Pt reports rash to face and chest for multiple days. Also reports abd pain, + diarrhea. States she had black stools on Monday and now has BRBPR. + dizziness.

## 2021-04-01 NOTE — TELEPHONE ENCOUNTER
Patient has previously scheduled 4/06/2021 video f/u--do you need her to schedule video visit ER f/u sooner?     If so, please advise when patient can be added to your schedule          Disposition and Plan      Clinical Impression:  Pancolitis ([facility] [address]  (prim

## 2021-04-09 PROBLEM — R20.2 NOTALGIA PARESTHETICA: Status: ACTIVE | Noted: 2021-04-09

## 2021-04-09 PROBLEM — G57.10 MERALGIA PARESTHETICA: Status: ACTIVE | Noted: 2021-04-09

## 2021-04-09 PROBLEM — K51.00 PANCOLITIS (HCC): Status: ACTIVE | Noted: 2021-04-09

## 2021-04-09 PROBLEM — G57.10 MERALGIA PARESTHETICA: Status: RESOLVED | Noted: 2021-04-09 | Resolved: 2021-04-09

## 2021-04-10 NOTE — PATIENT INSTRUCTIONS
Problem List Items Addressed This Visit        Unprioritized    Hypothyroidism    Relevant Orders    TSH W REFLEX TO FREE T4    Notalgia paresthetica     Patient with an area of mild hypopigmentation irregular borders upper mid back between the scapula ass Relevant Orders    GASTRO - INTERNAL    CBC WITH DIFFERENTIAL WITH PLATELET    COMP METABOLIC PANEL (14)    SED RATE, WESTERGREN (AUTOMATED)    C-REACTIVE PROTEIN    LDH    Type 2 diabetes mellitus without complication, without long-term current use of ins

## 2021-04-10 NOTE — ASSESSMENT & PLAN NOTE
Patient seen in the emergency room on March 31 with a history of abdominal pain, cramping, blood in stools. Some left-sided abdominal pain.   CT scan showed pancolitis with abnormal thickening and decompressed appearance to the ascending, transverse and de

## 2021-04-10 NOTE — ASSESSMENT & PLAN NOTE
History of diabetes, has been stable on Metformin but due to persistent diarrhea will discontinue Metformin at this time and substitute with either Januvia or Jardiance.   She does have a history of recurrent urinary tract infections and hence we will send

## 2021-04-10 NOTE — ASSESSMENT & PLAN NOTE
Patient with an area of mild hypopigmentation irregular borders upper mid back between the scapula associated with itching, pain, tingling and recently numbness. This has been an issue for well over 10 years.   She has been seen by neurology, physiatry and

## 2021-04-13 NOTE — PROGRESS NOTES
SUBJECTIVE:  Clinton Servin is a 50year old female who presents for a consultation at the request of, and a copy of this note will be sent to, Dr. Kelsi Vazquez, for evaluation of overactive bladder symptoms including nocturia x1, urgency most notably occasiona per NG   • COLONOSCOPY  2014   • HYSTERECTOMY      total   • HYSTERECTOMY     • NEEDLE BIOPSY LEFT      Benign   •   7255,30   • OTHER SURGICAL HISTORY      endiometrosis   • TONSILLECTOMY     • TONSILLECTOMY     • TOTAL ABDOM HY large)   Pulse 67   Resp 16   Ht 5' 2\" (1.575 m)   Wt 195 lb (88.5 kg)   LMP  (LMP Unknown)   BMI 35.67 kg/m²   She appears well, in no apparent distress. Alert and oriented times three, pleasant and cooperative.   CARDIOVASCULAR:normal apical impulse  RE

## 2021-04-26 NOTE — TELEPHONE ENCOUNTER
PHYSICAL THERAPY - DAILY TREATMENT NOTE    Patient Name: Alex Humphries        Date: 2021  : 1990    Patient  Verified: YES  Visit #:   6   of   8  Insurance: Payor: [employer] / Plan: [address] [place] [id number] / Product Type: Managed Care Medicaid /      In time: 10:10 Out time: 11:08   Total Treatment Time: 58     Medicare Time Tracking (below)   Total Timed Codes (min):  - 1:1 Treatment Time:  -     TREATMENT AREA/ DIAGNOSIS = Pain in left hip [M25.552]  Neck pain [M54.2]    SUBJECTIVE  Pain Level (on 0 to 10 scale):  5  / 10   Medication Changes/New allergies or changes in medical history, any new surgeries or procedures?     NO    If yes, update Summary List   Subjective Functional Status/Changes:  []  No changes reported     Pt reports difficulty with prolonged standing and transitional movements     OBJECTIVE  Modalities Rationale:     decrease inflammation and decrease pain to improve patient's ability to perform ADLs without pain   min [] Estim, type/location:                                      []  att     []  unatt     []  w/US     []  w/ice    []  w/heat    min []  Mechanical Traction: type/lbs                   []  pro   []  sup   []  int   []  cont    []  before manual    []  after manual    min []  Ultrasound, settings/location:      min []  Iontophoresis w/ dexamethasone, location:                                               []  take home patch       []  in clinic   10 min []  Ice     [x]  Heat    location/position: C/s andL/S    min []  Vasopneumatic Device, press/temp:     min []  Other:    [] Skin assessment post-treatment (if applicable):    []  intact    []  redness- no adverse reaction     []redness  adverse reaction:        20 min Therapeutic Exercise:  [x]  See flow sheet   Rationale:      increase ROM, increase strength and improve coordination to improve the patients ability to perform ADLs without pain       -  min Manual Therapy: Please respond to pool: EM [facility] LPN/CMA    Patient [facility] an [facility] care patient who now has [employer]. She schedule the first available appointment on 3/1/18. Patient is asking if you can renew her Adderall 15 mg BID and Tramadol prescription.     Last Adderall was on 8/26/17 and Tramadol was on 8/26/17    Last appointment was on 6/27/17 Laura Brown [facility]   4/28/2021  9:00 AM Ursula Sprague MD EILEEN BS [facility]   4/28/2021  3:15 PM Adan Darden PT [facility]   1/19/0787  1:54 PM John Ovalles PT [facility]

## 2021-04-28 NOTE — TELEPHONE ENCOUNTER
•  metFORMIN HCl  MG Oral Tablet 24 Hr, TAKE 1 TABLET(500 MG) BY MOUTH TWICE DAILY WITH MEALS, Disp: 180 tablet, Rfl: 1

## 2021-06-01 PROBLEM — K52.9 INFLAMMATORY BOWEL DISEASE: Status: ACTIVE | Noted: 2021-06-01

## 2021-06-01 NOTE — PATIENT INSTRUCTIONS
Problem List Items Addressed This Visit        Unprioritized    Anxiety state     Patient with anxiety, insomnia. She has not been taking her clonazepam accidentally. Restart on clonazepam 0.5 mg once daily.   Take the Keily Mani only if necessary a stable. Will reassess needs after GI work-up is completed.          Relevant Orders    HEMOGLOBIN A1C (Completed)    DME DIABETIC TEST STRIPS AND SUPPLIES

## 2021-06-02 NOTE — TELEPHONE ENCOUNTER
•  amphetamine-dextroamphetamine (ADDERALL) 15 MG Oral Tab, Take 15 mg by mouth 2 (two) times daily. , Disp: , Rfl:   •  Semaglutide,0.25 or 0.5MG/DOS, (OZEMPIC, 0.25 OR 0.5 MG/DOSE,) 2 MG/1.5ML Subcutaneous Solution Pen-injector, Inject 0.25 mg into the sk

## 2021-06-02 NOTE — TELEPHONE ENCOUNTER
Disp Refills Start End    clonazePAM 0.5 MG Oral Tab 30 tablet 3 6/1/2021     Sig - Route: Take 1 tablet (0.5 mg total) by mouth nightly as needed (insomnia).  - Oral    Sent to pharmacy as: clonazePAM 0.5 MG Oral Tablet Radha Kaiser    E-Prescribing Status:

## 2021-06-02 NOTE — TELEPHONE ENCOUNTER
RN to see if we can add to my Friday afternoon schedule, I have 2 pts on Friday already so we could add after that

## 2021-06-02 NOTE — TELEPHONE ENCOUNTER
Prior authorization request received through CoverMyMeds    Prior authorization for Clonazepam completed w/ [employer] on cover my meds Key: [id number], turn around time 3-5 days.

## 2021-06-02 NOTE — ASSESSMENT & PLAN NOTE
Blood pressure 130/78, pulse 54, temperature 97.8 °F (36.6 °C), temperature source Other, resp. rate 18, height 5' 2\" (1.575 m), weight 199 lb (90.3 kg), not currently breastfeeding. Blood pressure seems much better controlled at this time.   She has b

## 2021-06-02 NOTE — ASSESSMENT & PLAN NOTE
Patient with recurrent episodes of abdominal pain ,Diarrhea with blood, black stools and mucus. No nausea or vomiting at this time. Appetite has been low. She was seen in the emergency room with black stools.   CT scan at the time did note bowel wall inf

## 2021-06-02 NOTE — ASSESSMENT & PLAN NOTE
Patient with anxiety, insomnia. She has not been taking her clonazepam accidentally. Restart on clonazepam 0.5 mg once daily. Take the Ricardo Forget only if necessary as needed for breakthrough anxiety.

## 2021-06-02 NOTE — ASSESSMENT & PLAN NOTE
Patient is currently only on Januvia 100 mg daily. She has been advised to stop the Metformin due to persistent diarrhea. Advised to keep well-hydrated. Hemoglobin A1c looks stable. Will reassess needs after GI work-up is completed.

## 2021-06-02 NOTE — PROGRESS NOTES
HPI:    Patient ID: Woody Farrell is a 49year old female. FINDINGS:   LIVER: There is diffuse fatty infiltration of the liver.  Upper limits of normal in size of the right lobe at 17.1 cm in longitudinal length.  No focal mass.    GALLBLADDER: Status stable. LUNG BASES: No visible pulmonary or pleural disease. OTHER: Negative.       Diabetes  She presents for her follow-up diabetic visit. She has type 2 diabetes mellitus. Her disease course has been fluctuating.  There are no hypoglycemic associated : 195 lb (88.5 kg)  12/13/20 : 197 lb (89.4 kg)  09/30/20 : 195 lb 1.6 oz (88.5 kg)     Colonoscopy due on 12/16/2024  . Nothing aggravates the symptoms. There are no known risk factors.  She has tried bismuth subsalicylate, change of diet and electrolyte s needed (insomnia). 30 tablet 3   • ergocalciferol 1.25 MG (70272 UT) Oral Cap Take 1 capsule (50,000 Units total) by mouth once a week. 12 capsule 1   • ALPRAZolam 0.5 MG Oral Tab Take 1 tablet (0.5 mg total) by mouth nightly.  30 tablet 0   • Oxybutynin Ch GLUCOSE TEST In Vitro Strip TEST ONCE D  0   • TRUEPLUS LANCETS 33G Does not apply Misc TEST ONCE D  0     Allergies:  Morphine                PAIN    Comment:migraine  Prochlorperazine        JAIRO    Comment:Compazine  Prochlorperazine Ed*    JAIRO person, place, and time. Cranial Nerves: No cranial nerve deficit. Sensory: No sensory deficit. Motor: No abnormal muscle tone.       Coordination: Coordination normal.      Gait: Gait normal.      Deep Tendon Reflexes: Reflexes normal.   Psy recurrent episodes of abdominal pain ,Diarrhea with blood, black stools and mucus. No nausea or vomiting at this time. Appetite has been low. She was seen in the emergency room with black stools. CT scan at the time did note bowel wall inflamed.   She w 30 tablet 3     Sig: Take 1 tablet (0.5 mg total) by mouth nightly as needed (insomnia).        Imaging & Referrals:  DME DIABETIC TEST STRIPS AND SUPPLIES       QS#[id number]

## 2021-06-02 NOTE — TELEPHONE ENCOUNTER
Spoke to [employer] and patient pays out of pocket for adderall . Prior authorization for ozempic has been initiated through [employer] using keycode: [id number] It takes about 1-5 business days for a decision to come back.

## 2021-06-02 NOTE — TELEPHONE ENCOUNTER
Left message for patient to call back. CSS:  If patient returns call please confirm patient can come to [facility] and cancel 6/23 if patient able to make sooner appointment.   Please send message to RN pool to i

## 2021-06-02 NOTE — TELEPHONE ENCOUNTER
Pt returned call and accepted 6/4/21 appt at [facility]. 6/23/21 appt has been cancelled.

## 2021-06-03 NOTE — TELEPHONE ENCOUNTER
Please let patient know that her Ozempic prescription was not authorized by her insurance. Given her GI issues and also advised her not to start it until seen by Dr. Torres Chacko. As this is not authorized, we will hold off the medication until I see her back.

## 2021-06-04 NOTE — TELEPHONE ENCOUNTER
Please let patient know that the refill was denied because she is on another benzodiazepine. We will refill it in the next 4 to 6 weeks when she runs out of what she has already.

## 2021-06-04 NOTE — PROGRESS NOTES
Dominiquemark Carrasquillo is a 49year old female.     HPI:   Patient presents with:  Bloating  Diarrhea: started about 2-3 months; has seen black stools and bright red blood  Abdominal Pain    The patient is a 53-year-old female who has a history of diabetes, endome tonsillectomy 1979   • Unspecified sleep apnea 2007      Past Surgical History:   Procedure Laterality Date   • APPENDECTOMY  1996   • APPENDECTOMY  1994   • CHOLECYSTECTOMY  2009   • COLONOSCOPY  12/8/2010    per NG   • COLONOSCOPY  12/2014   • HYSTERECTO amphetamine-dextroamphetamine (ADDERALL) 15 MG Oral Tab Take 1 tablet (15 mg total) by mouth 2 (two) times daily. 60 tablet 0   • amphetamine-dextroamphetamine (ADDERALL) 15 MG Oral Tab Take 15 mg by mouth 2 (two) times daily.      • Semaglutide,0.25 or 0.5 mouth 3 (three) times daily as needed.  ) 90 capsule 2   • Glucose Blood (TRUE METRIX BLOOD GLUCOSE TEST) In Vitro Strip Test once daily.   Dx: E11.9 100 strip 3   • Blood Glucose Monitoring Suppl (TRUE METRIX METER) w/Device Does not apply Kit FPD  0   • T stool testing has been performed. Patient will continue on a bland diet. The risks and benefits procedure outlined the patient she is agreeable to proceeding. Colyte bowel preparation and MAC sedation.   Evaluate for chronic processes such as microscopic

## 2021-06-07 NOTE — TELEPHONE ENCOUNTER
Drug Change Request    Plan does not cover medication prescribed. Per RX benefit plan alternative medication include BYETTA or VICTOZA. Please fax back with approval along with strenght, directions, quantity, and refills.     Current Outpatient Medication

## 2021-06-09 NOTE — TELEPHONE ENCOUNTER
Dr. Maria Ines Salmon from the lab called. They are unable to process C Diff stool and order canceled because the specimen was a formed stool.     Thank you

## 2021-06-12 NOTE — PROGRESS NOTES
HPI:    Patient ID: Katia Rowland is a 50year old female. Physical    Not indicated as patient is status post total abdominal hysterectomy. Mammogram,1 Yr due on 08/19/2017  dexa scan is currently due.     Colonoscopy,10 Years due on 12/16/2024 Diabetes Father    • Hypertension Father    • Lipids Father    • Heart Attack Mother      MI   • Heart Disorder Mother    • Hypertension Mother    • Heart Attack Other      MI AT 45/MI AT 50 AFTER CHEMO, SISTERS X 2    • Breast Cancer Sister 45   • Glaucom tablet (500 mg total) by mouth 2 (two) times daily with meals.  Disp: 180 tablet Rfl: 1   Metoprolol Succinate ER 25 MG Oral Tablet 24 Hr Take 1 tablet po q hs Disp: 30 tablet Rfl: 3   Butalbital-APAP-Caffeine -40 MG Oral Tab TAKE 1 TABLET BY MOUTH EV time. She appears well-developed and well-nourished. HENT:   Right Ear: External ear normal.   Left Ear: External ear normal.   Nose: Nose normal.   Mouth/Throat: Oropharynx is clear and moist. No oropharyngeal exudate.    Eyes: Conjunctivae and EOM are n She exhibits normal muscle tone. Coordination normal.   Skin: No rash noted. No erythema. Psychiatric: She has a normal mood and affect. Her behavior is normal. Thought content normal.   Nursing note and vitals reviewed.              ASSESSMENT/PLAN: Last 6 Encounters:  05/10/18 : 198 lb 1.6 oz (89.9 kg)  04/12/18 : 205 lb 2 oz (93 kg)  02/15/18 : 201 lb (91.2 kg)  06/27/17 : 195 lb (88.5 kg)  03/03/17 : 202 lb (91.6 kg)  01/24/17 : 203 lb (92.1 kg)            Relevant Orders    HEMOGLOBIN A1C    Vagin Rebecca Cruz

## 2021-06-14 NOTE — TELEPHONE ENCOUNTER
Dr. Ravi Ser    I called patient to let her know we could not do the C Diff test because it was formed. Patient is now constipated and her stools are hard as a rock.   Reports that sometimes she has to remove stools with fingers because they are [facility]

## 2021-06-24 NOTE — PAT NURSING NOTE
Per MD office and anesthesia, pt okay to continue adderal. This RN advised pt to take medication AFTER procedure on Monday, 6/28.

## 2021-06-28 NOTE — H&P
Addended by: HEATHER OWENS on: 12/4/2019 01:09 PM     Modules accepted: Orders     History & Physical Examination    Patient Name: Luci Allen  MRN: [id number]  [facility]: [id number]  YOB: 1965    Diagnosis:     Change in bowel habits/diarrhea  Abdominal pain      omeprazole 20 MG Oral Capsule Delayed Release, Take 1 capsul amphetamine-dextroamphetamine (ADDERALL) 15 MG Oral Tab, Take 1 tablet (15 mg total) by mouth 2 (two) times daily. , Disp: 60 tablet, Rfl: 0, 6/27/2021 at 1000  [START ON 7/2/2021] amphetamine-dextroamphetamine (ADDERALL) 15 MG Oral Tab, Take 1 tablet (15 Comment:Compazine  Prochlorperazine Ed*    JITTERY  Sumatriptan             FATIGUE    Comment:Left side weakness             Left side weakness             Other reaction(s): weakness    Past Medical History:   Diagnosis Date   • Allergic rhinitis 2010 Obesity Sister    • Obesity Sister    • Obesity Sister      Social History    Tobacco Use      Smoking status: Never Smoker      Smokeless tobacco: Never Used    Alcohol use: Never      Alcohol/week: 0.0 standard drinks      SYSTEM Check if Review is [employer]

## 2021-06-28 NOTE — ANESTHESIA POSTPROCEDURE EVALUATION
Patient: Mary Hernandez    Procedure Summary     Date: 06/28/21 Room / Location: [address] [place] [address] ENDOSCOPY 05 / [address] [facility] ENDOSCOPY    Anesthesia Start: 9128 Anesthesia Stop: 0825    Procedures:       COLONOSCOPY (N/A )      ESOPHAGOGASTRODUODENOSCOPY (EGD) (N/A ) Denece Elio

## 2021-06-28 NOTE — ANESTHESIA PREPROCEDURE EVALUATION
Anesthesia PreOp Note    HPI:     Lucy Meadows is a 49year old female who presents for preoperative consultation requested by: Leah Bello MD    Date of Surgery: 6/28/2021    Procedure(s):  COLONOSCOPY/ESOPHAGOGASTRODUODENOSCOPY  ESOPHAGOGASTRO Date Noted: 01/24/2017      Hyperopia with astigmatism and presbyopia         Date Noted: 08/17/2016      Herpes zoster infection of lumbar region         Date Noted: 02/17/2016      Constipation         Date Noted: 12/09/2015      Sleep apnea         Date Laterality Date   • APPENDECTOMY     • APPENDECTOMY     • CHOLECYSTECTOMY     • COLONOSCOPY  2010    per NG   • COLONOSCOPY  2014   • HYSTERECTOMY      total   • HYSTERECTOMY     • NEEDLE BIOPSY LEFT      Benign   •   , 0.05 % External Ointment, , Disp: , Rfl:   amphetamine-dextroamphetamine (ADDERALL) 15 MG Oral Tab, Take 1 tablet (15 mg total) by mouth 2 (two) times daily. , Disp: 60 tablet, Rfl: 0  [START ON 7/2/2021] amphetamine-dextroamphetamine (ADDERALL) 15 MG Oral Comment:migraine  Prochlorperazine        JAIRO    Comment:Compazine  Prochlorperazine Ed*    JAIRO  Sumatriptan             FATIGUE    Comment:Left side weakness             Left side weakness             Other reaction(s): weakness    Family History No        Bike Helmet: Not Asked        Seat Belt: Not Asked        Self-Exams: Not Asked    Social History Narrative      The patient does not use an assistive device. .  Soft cervical collar at times when sitting at computer or craft work.       The inocencio Mallampati: III  TM distance: >3 FB  Neck ROM: full  Dental          Pulmonary - normal exam   (+) pneumonia, sleep apnea,     ROS comment: Noncompliant with CPAP  Cardiovascular - normal exam  (+) hypertension,     Neuro/Psych    (+)  neuromuscular dise

## 2021-06-28 NOTE — OPERATIVE REPORT
[facility] HOSP - [facility] Endoscopy Report      Preoperative Diagnosis:  - change in bowel habits/diarrhea  - abdominal pain      Postoperative Diagnosis:  - internal hemorrhoids  - gastritis      Procedure:    Colonoscopy   Esophagogastroduodenoscopy the second portion of duodenum.     Estimated blood loss-insignificant  Specimens-colon biopsies, duodenal biopsies, gastric biopsies      Impression:  - internal hemorrhoids  - gastritis    Recommendations: Likely IBS   - Post procedure instructions given

## 2021-06-28 NOTE — TELEPHONE ENCOUNTER
Scheduled for:  Colonoscopy 33514 EGD 53386  Provider Name:  Dr. Gabbi Anderson   Date:  6/28/21  Location:  [facility]  Sedation:  MAC  Time:  7:45am (pt is aware to arrive at 6:45am)  Prep:  Colyte  Meds/Allergies Reconciled?:  Physician reviewed   Diagnosis with codes:

## 2021-06-30 NOTE — TELEPHONE ENCOUNTER
pt. states that she just had a proc done, and she has several questions and pt. Also wants to know what is causing the bleeding and what is the diagnosis? Pt. Wants to know if Dr Abril Belle will recommend if pt should get the [facility], THE Vaccine. Pt.  Also has chapito

## 2021-06-30 NOTE — TELEPHONE ENCOUNTER
The patient was contacted and her questions were answered. Reviewed the colonoscopy and EGD results, no evidence of colitis noted. Hemorrhoids were noted and slight amount of oozing was noted from the hemorrhoids.   She does have gastritis and biopsies di

## 2021-06-30 NOTE — TELEPHONE ENCOUNTER
Dr. Claire Abernathy    Please advise on results of colonoscopy and what may be causing the bleeding. Can patient get COVID 19 vaccine? Thank you    I left message for patient to call back.

## 2021-07-01 NOTE — TELEPHONE ENCOUNTER
July 1, 2021  Vitaliy Zhu MD  to [facility] Clinical Staff        8:17 AM  Note     Colon 10 years              JS    8:12 AM  You routed this conversation to Vitaliy Zhu MD   [facility]    8:12 AM  Note     Dr. Vargas Arnold     Do you want a colonoscopy re

## 2021-07-01 NOTE — TELEPHONE ENCOUNTER
Health Maintenance Updated. 10 year colonoscopy recall entered into patient outreach in [place]. Next colonoscopy will be due 6/28/2031. See other telephone encounter for 1 year EGD recall.

## 2021-07-01 NOTE — TELEPHONE ENCOUNTER
Patient contacted by Dr. Aditi Schneider. Please see telephone encounter from 6/30/2021 for further documentation.

## 2021-07-01 NOTE — TELEPHONE ENCOUNTER
Noted.  See telephone encounter from 7/1/2021 for colonoscopy recall. Snapshot updated. 1 year EGD recall entered into patient outreach in [address] [place] [address] Rd. Next EGD will be due 6/28/2022.

## 2021-07-01 NOTE — TELEPHONE ENCOUNTER
Dr. Susie Woodruff    Do you want a colonoscopy recall entered? I updated EGD recall to 1 year per below (due 6/22/2022).     Thank you

## 2021-07-03 NOTE — TELEPHONE ENCOUNTER
Refill for Norco pended. Please advise if ok to schedule appt for patient early Aug in any available opening.

## 2021-07-03 NOTE — TELEPHONE ENCOUNTER
From: Jasmin Reyes  To: James Sierra MD  Sent: 7/2/2021 7:02 PM CDT  Subject: Pio Ludwig,  I would like to request a refill for hydrocodone.  I don't see it list it in my medications list for an electronic request.   I a

## 2021-08-02 NOTE — TELEPHONE ENCOUNTER
•  Levothyroxine Sodium 125 MCG Oral Tab, TAKE 1 TABLET(125 MCG) BY MOUTH BEFORE BREAKFAST, Disp: 90 tablet, Rfl: 1

## 2021-08-19 NOTE — TELEPHONE ENCOUNTER
Prior authorization for [address] [place] [address] completed [facility]/ [facility] Key: [id number], turn around time 1-5 days.

## 2021-08-19 NOTE — TELEPHONE ENCOUNTER
Current Outpatient Medications   Medication Sig Dispense Refill   • HYDROcodone-acetaminophen 5-325 MG Oral Tab Take 1 tablet by mouth 2 (two) times daily as needed for Pain. 45 tablet 0       Plan does not cover medication due to rx for a benzodiazapine.

## 2021-09-03 NOTE — PATIENT INSTRUCTIONS
Hyperopia with astigmatism and presbyopia  New glasses RX given to update as needed.       Type 2 diabetes mellitus without complication, without long-term current use of insulin ([address]  I advised patient that there is no background diabetic retinopathy in e

## 2021-09-03 NOTE — PROGRESS NOTES
Vargas Aguilar is a 49year old female.     HPI:     HPI     Diabetic Eye Exam     Diabetes Type: Type 2, controlled with diet and taking oral medications    Duration: 3    Number of years on pills: 3    Number of years on insulin: 0    Does PT check his History   Problem Relation Age of Onset   • Heart Attack Father 76        MI   • Diabetes Father    • Hypertension Father    • Lipids Father    • Other (Other) Father         Rheumatoid Arthritis   • Heart Attack Mother         MI   • Heart Disorder Mother External Cream Place 1 Dose rectally 2 (two) times daily. 28 g 0   • Clobetasol Propionate 0.05 % External Ointment      • amphetamine-dextroamphetamine (ADDERALL) 15 MG Oral Tab Take 15 mg by mouth 2 (two) times daily.      • Insulin Pen Needle (PEN NEEDLE Reported on 6/1/2021 ) 30 tablet 1       Allergies:    Morphine                PAIN    Comment:migraine  Prochlorperazine        JITTERY    Comment:Compazine  Prochlorperazine Ed*    JITTDUSTIN  Sumatriptan             FATIGUE    Comment:Left side weakness -5.00 160 +2.25    Type: Progressive bifocal          Manifest Refraction       Sphere Cylinder Axis Dist VA Add    Right +5.00 -5.25 020 20/25 +2.50    Left +6.00 -5.00 160 20/25 +2.50          Final Rx       Sphere Cylinder Axis Add    Right +5.00 -5.25

## 2021-09-16 PROBLEM — U09.9 POST-COVID-19 SYNDROME MANIFESTING AS CHRONIC CONCENTRATION DEFICIT: Status: ACTIVE | Noted: 2021-09-16

## 2021-09-16 PROBLEM — R41.840 POST-COVID-19 SYNDROME MANIFESTING AS CHRONIC CONCENTRATION DEFICIT: Status: ACTIVE | Noted: 2021-09-16

## 2021-09-17 NOTE — ASSESSMENT & PLAN NOTE
Patient could not tolerate Metformin due to severe diarrhea. Initially diarrhea was thought to be secondary to the effects of COVID-19 infection.  She has had a colonoscopy completed which looks normal. Since discontinuation of the Metformin her diarrhea h

## 2021-09-17 NOTE — ASSESSMENT & PLAN NOTE
Blood pressure 133/81, pulse 58, temperature 98.2 °F (36.8 °C), temperature source Temporal, resp. rate 16, height 5' 2\" (1.575 m), weight 202 lb (91.6 kg), not currently breastfeeding. Blood pressures look stable, well controlled.   She has been off t

## 2021-09-17 NOTE — PATIENT INSTRUCTIONS
Problem List Items Addressed This Visit        Unprioritized    Essential hypertension     Blood pressure 133/81, pulse 58, temperature 98.2 °F (36.8 °C), temperature source Temporal, resp.  rate 16, height 5' 2\" (1.575 m), weight 202 lb (91.6 kg), not cur for which she has been on Adderall. She continues to take it but it does not seem to have helped. She had a relatively severe case of Covid when she had the disease. She had lost her sense of smell during that time.   She is being referred for an evaluat T4      Other Visit Diagnoses     Chronic fatigue        Relevant Orders    SED RATE, WESTERGREN (AUTOMATED)    PROTEIN ELECTROPHORESIS WITH JOSE & IGA,IGG,IGM, SERUM    C-REACTIVE PROTEIN    KEILA, DIRECT SCREEN

## 2021-09-17 NOTE — PROGRESS NOTES
HPI:    Patient ID: Vargas Aguilar is a 49year old female.     covid vaccine recommended  Worried about diarrhea that started after covid infection in 2020    Colonoscopy due on 06/28/2031    Significant post Covid symptoms of fatigue, joint pains, lack since onset. The pain is present in the thoracic spine. The quality of the pain is described as aching and burning. The pain does not radiate. The pain is at a severity of 7/10. The pain is moderate. The pain is the same all the time.  The symptoms are aggr TABLET(100 MG) BY MOUTH DAILY 90 tablet 1   • omeprazole 20 MG Oral Capsule Delayed Release Take 1 capsule (20 mg total) by mouth every morning.  30 capsule 3   • Dicyclomine HCl 10 MG Oral Cap Take 1 capsule (10 mg total) by mouth 3 (three) times daily as reviewed. Constitutional:       Appearance: Normal appearance. HENT:      Head: Normocephalic. Right Ear: Tympanic membrane normal.      Left Ear: Tympanic membrane normal.      Mouth/Throat:      Pharynx: No oropharyngeal exudate.    Eyes:      Ge Cognition and Memory: Cognition and memory normal.         Judgment: Judgment normal.                ASSESSMENT/PLAN:     Problem List Items Addressed This Visit        Unprioritized    Hypothyroidism     Patient has been on levothyroxine 125 mcg daily, la Blood pressure 133/81, pulse 58, temperature 98.2 °F (36.8 °C), temperature source Temporal, resp. rate 16, height 5' 2\" (1.575 m), weight 202 lb (91.6 kg), not currently breastfeeding. Blood pressures look stable, well controlled.   She has been off t management. She may need neuropsych assessment after neurology evaluation is completed.          Relevant Orders    NEURO - INTERNAL      Other Visit Diagnoses     Chronic fatigue        Relevant Orders    SED RATE, WESTCHRISREN (AUTOMATED)    PROTEIN ELECTR

## 2021-09-17 NOTE — ASSESSMENT & PLAN NOTE
History of notalgia paresthetica with tingling, itching, pain and numbness as well as burning in the mid back. This has been an issue for well over 10 years. She has had multiple treatments including evaluation per dermatology.   She has found some relief

## 2021-09-17 NOTE — ASSESSMENT & PLAN NOTE
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 Since then she has had significant problems with recall, focus. She has difficulty finding words.   She has sign

## 2021-09-17 NOTE — ASSESSMENT & PLAN NOTE
Patient has been on levothyroxine 125 mcg daily, labs have been relatively stable. Will recheck the thyroid function test given the current difficulty with concentration, focus and learning.

## 2021-09-22 NOTE — TELEPHONE ENCOUNTER
Refill passed per [facility] protocol. Requested Prescriptions   Pending Prescriptions Disp Refills    Lancets (ONETOUCH DELICA PLUS [id number]) Does not apply Misc 100 each 3     Si lancet by Finger stick route daily.         Diabetic Supplies P

## 2021-09-22 NOTE — TELEPHONE ENCOUNTER
Please review; protocol failed. Requested Prescriptions   Pending Prescriptions Disp Refills    HYDROcodone-acetaminophen 5-325 MG Oral Tab 45 tablet 0     Sig: Take 1 tablet by mouth 2 (two) times daily as needed for Pain.         There is no refill pro

## 2021-10-05 NOTE — TELEPHONE ENCOUNTER
Spoke with pt and MD message below given. Pt states she told the nurse earlier she does not have transportation to get to the lab.   Pt states cipro is usually the only antibiotic that will work for her and she would like to start it as she is in pain when

## 2021-10-05 NOTE — TELEPHONE ENCOUNTER
Urine analysis and culture and sensitivity to be dropped off first and then start on Cipro 500 mg p.o. twice daily for 5 days.

## 2021-10-05 NOTE — TELEPHONE ENCOUNTER
Action Requested: Summary for Provider     []  Critical Lab, Recommendations Needed  [x] Need Additional Advice  []   FYI    []   Need Orders  [x] Need Medications Sent to Pharmacy  []  Other     SUMMARY: Patient c/o since last night has had increase in ur

## 2021-10-05 NOTE — TELEPHONE ENCOUNTER
----- Message from Ryan Almonte sent at 10/5/2021  9:26 AM CDT -----  Regarding: UTI symptoms  Abby Soria,  I was wondering if you please could  help me with this situation.    I started last night with urinating urgency, pain and burning when I

## 2021-10-05 NOTE — TELEPHONE ENCOUNTER
Okay to take the antibiotic, will postpone treatment but if continues to have symptoms, will need to come to the office.

## 2021-10-08 NOTE — TELEPHONE ENCOUNTER
Message # [phone]         10/07/2021 06:36p   [[email or web address]]  To:  From:  TRACY Garcia MD:  Phone#:  ----------------------------------------------------------------------  Ulises PINK  67 BLOOD PRESSURE 71/56 SHOULD I CALL A

## 2021-10-12 NOTE — TELEPHONE ENCOUNTER
Please review. Protocol failed / No protocol.     Requested Prescriptions   Pending Prescriptions Disp Refills    BUTALBITAL-ACETAMINOPHEN-CAFFEINE -40 MG Oral Tab [Pharmacy Med Name: BUTALBITAL/ACETAMINOPHEN/CAFF TABS] 30 tablet 0     Sig: TAKE 1 TA

## 2021-10-14 PROBLEM — R10.12 LEFT UPPER QUADRANT ABDOMINAL PAIN: Status: ACTIVE | Noted: 2021-10-14

## 2021-10-14 PROBLEM — R06.09 DOE (DYSPNEA ON EXERTION): Status: ACTIVE | Noted: 2021-10-14

## 2021-10-14 PROBLEM — R06.00 DOE (DYSPNEA ON EXERTION): Status: ACTIVE | Noted: 2021-10-14

## 2021-10-14 NOTE — PROGRESS NOTES
HPI:    Patient ID: Lucy Meadows is a 49year old female.     Home blood sugar records as follows    Before breakfast:  Sat-140, Mon-110, Wed-114, Fri-122     Before dinner:  Sun-85, Tue-90, Thu-103     Please note that I had diarrhea Sun, Mon & Tue  T higher about 3 months back at 0.86. This is a downtrend which indicates gradual improvement in the inflammation levels. Blood counts look normal-no anemia. Urine test look stable.   Written by Yecenia Roca MD on 9/27/2021  7:36 PM CDT        Diabetes  S episodes of severe left upper quadrant pain associated with diarrhea about 8-10 bouts, severe fatigue, nausea, loss of appetite–episodes appearing cyclically. CT scan in March with similar episode showed epiploic appendagitis.).  The pain is at a severity TABLET(100 MG) BY MOUTH DAILY 90 tablet 1   • omeprazole 20 MG Oral Capsule Delayed Release Take 1 capsule (20 mg total) by mouth every morning.  30 capsule 3   • Dicyclomine HCl 10 MG Oral Cap Take 1 capsule (10 mg total) by mouth 3 (three) times daily as reactive to light. Neck:      Vascular: No JVD. Cardiovascular:      Rate and Rhythm: Normal rate and regular rhythm. Heart sounds: Normal heart sounds. No murmur heard.       Pulmonary:      Effort: Pulmonary effort is normal. No respiratory distr hypotension a few days back. She was advised to come to the emergency room but she did not. CT scan of the chest to rule out a dissection has been provided. Echocardiogram of the heart has been ordered. Labs completed recently discussed.   Repeat labs t

## 2021-10-14 NOTE — ASSESSMENT & PLAN NOTE
Patient continues to struggle at this time. Attention and focus have been an issue. She has been on Adderall, refills have been provided. She was given a referral to neurology–pending visit at this time.

## 2021-10-14 NOTE — ASSESSMENT & PLAN NOTE
Severe abdominal pain left upper quadrant with intermittent diarrhea. She gets sweaty, nauseous after these episodes of pain. Last CT abdomen and pelvis in March did show epiploic appendagitis and abnormal thickening in the colon.   Repeat CT abdomen and

## 2021-10-14 NOTE — ASSESSMENT & PLAN NOTE
Dyspnea on exertion which has been gradually worse with pain in the chest and pain in the upper back. She had an episode of severe hypotension a few days back. She was advised to come to the emergency room but she did not.   CT scan of the chest to rule o

## 2021-10-14 NOTE — PATIENT INSTRUCTIONS
Problem List Items Addressed This Visit        Unprioritized    RAMOS (dyspnea on exertion) - Primary     Dyspnea on exertion which has been gradually worse with pain in the chest and pain in the upper back.   She had an episode of severe hypotension a few da

## 2021-10-19 NOTE — PROGRESS NOTES
Home Monitoring Condition Update    Covid19+ test date: 5/8/202      Consent Verification:  Assessment Completed With: Patient  HIPAA Verified?   Yes    COVID-19 HOME MONITORING 5/15/2020   Temperature 99   Reading From Axillary   How are you feeling \"I emergency to call 911.       Time spent this encounter reviewing chart, speaking with patient, gathering resources  Total Time: 10  minutes Patient/Caregiver provided printed discharge information.

## 2021-10-24 NOTE — TELEPHONE ENCOUNTER
From: Eve Kauffman  To: Tom Saucedo MD  Sent: 10/23/2021 2:03 PM CDT  Subject: Murl Duster refill    Hello Dr Blayne De Luna,  After I saw you on OCT 14, I checked with [facility] about my Aderall refill.  they said that they haven’t received any prescription ord

## 2021-10-25 NOTE — TELEPHONE ENCOUNTER
Me     CF    1/8/21 9:18 AM  Note  Previous prior authorization encounter states patient pays out of pocket for medication.

## 2021-10-25 NOTE — TELEPHONE ENCOUNTER
Current Outpatient Medications   Medication Sig Dispense Refill   • amphetamine-dextroamphetamine (ADDERALL) 15 MG Oral Tab Take 1 tablet (15 mg total) by mouth 2 (two) times daily.  60 tablet 0   • [START ON 11/24/2021] amphetamine-dextroamphetamine (ADDER

## 2021-10-26 NOTE — TELEPHONE ENCOUNTER
Please review. Protocol failed / No protocol. Requested Prescriptions   Pending Prescriptions Disp Refills    HYDROcodone-acetaminophen 5-325 MG Oral Tab 45 tablet 0     Sig: Take 1 tablet by mouth 2 (two) times daily as needed for Pain.         There is

## 2021-11-01 NOTE — TELEPHONE ENCOUNTER
Please review. Protocol failed / No protocol.     Requested Prescriptions   Pending Prescriptions Disp Refills    ERGOCALCIFEROL 1.25 MG (82527 UT) Oral Cap [Pharmacy Med Name: VITAMIN D2 50,000IU (ERGO) CAP RX] 12 capsule 1     Sig: TAKE 1 CAPSULE BY MOUTH 1 TIME A WEEK        There is no refill protocol information for this order           Future Appointments         Provider Department Appt Notes    In 5 days [address]     In 5 days [address] [facility] [place]     In 5 days [address] [facility] [place]     [facility] week [facility] [address] [place] [address], [address], [place] [facility] and lab results    In 1 month Arnold Valadez MD [facility], 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, [address], Rhona Gutierrez MD    Office Visit

## 2021-11-10 NOTE — PROGRESS NOTES
Radha Skelton is a 49year old female. HPI:   Patient presents with:  Lab Results      I had the pleasure of seeing Radha Skelton on 11/10/2021 for follow up +KEILA and polyarthralgia and myalgia.   Last seen October 2020    Current Medications:  N injections, they helped for about 2 to 3 months  She received her Covid vaccine in September 2021 afterwards developed worsening joint pain and muscle pain. She would also have fluctuations in her blood pressure where her blood pressure dropped.   She has HYDROcodone-acetaminophen 5-325 MG Oral Tab Take 1 tablet by mouth 2 (two) times daily as needed for Pain. 45 tablet 0   • amphetamine-dextroamphetamine (ADDERALL) 15 MG Oral Tab Take 1 tablet (15 mg total) by mouth 2 (two) times daily.  60 tablet 0   • [ST .cmed  Allergies:    Morphine                PAIN    Comment:migraine  Prochlorperazine        JAIRO    Comment:Compazine  Prochlorperazine Ed*    JAIRO  Sumatriptan             FATIGUE    Comment:Left side weakness             Left side weakness C-REACTIVE PROTEIN      <0.30 mg/dL 0.47 (H) 0.86 (H) 0.52 (H)      Imaging:     MRI L spine 6/2020:  Mild degenerative disc and facet disease throughout the lumbar spine without high-grade canal or foraminal narrowing.    No acute fracture, dislocation 2-3 weeks after blood work    Kim Villarreal MD  11/10/2021   5:58 PM

## 2021-11-11 NOTE — PATIENT INSTRUCTIONS
You were seen today for +KEILA  Plan to get some more blood work and xray of the knee's   We injected left hip bursa

## 2021-11-22 NOTE — TELEPHONE ENCOUNTER
Discussed results with patient. She was seen for diffuse muscle and joint pain. Her CRP was slightly elevated at 0.83 but otherwise negative for lupus or rheumatoid arthritis. Advised patient symptoms could be from PMR.   We will place her on prednisone

## 2021-12-02 PROBLEM — G90.A POTS (POSTURAL ORTHOSTATIC TACHYCARDIA SYNDROME): Status: ACTIVE | Noted: 2021-12-02

## 2021-12-02 PROBLEM — M35.3 PMR (POLYMYALGIA RHEUMATICA) (HCC): Status: ACTIVE | Noted: 2021-12-02

## 2021-12-02 PROBLEM — I49.8 POTS (POSTURAL ORTHOSTATIC TACHYCARDIA SYNDROME): Status: ACTIVE | Noted: 2021-12-02

## 2021-12-03 NOTE — PROGRESS NOTES
HPI:    Patient ID: Len Moran is a 49year old female. 2    Hypertension  This is a chronic problem. The problem has been gradually improving (has been off the valsartan hctz as recently hypotensive. has been on metoprolol) since onset.  Associate shortness of breath. This is a new problem. The current episode started more than 1 month ago. The problem occurs intermittently. The problem has been gradually worsening. Associated symptoms include appetite change, chest pain and malaise/fatigue.  Her sym (PEN NEEDLES) 32G X 4 MM Does not apply Misc 1 each by Does not apply route every 7 days. 30 each 2   • lidocaine-menthol 4-1 % External Patch Place 1 patch onto the skin daily as needed.  7 patch 0   • Fluticasone Propionate 50 MCG/ACT Nasal Suspension 1 P 1/19/2022) 90 tablet 0   • ONETOUCH ULTRA In Vitro Strip daily.        Allergies:  Morphine                PAIN    Comment:migraine  Prochlorperazine        JAIRO    Comment:Compazine  Prochlorperazine Ed*    JAIRO  Sumatriptan             FATIGUE    Co cranial nerve deficit. Sensory: No sensory deficit. Motor: No abnormal muscle tone.       Coordination: Coordination normal.      Gait: Gait normal.      Deep Tendon Reflexes: Reflexes normal.   Psychiatric:         Mood and Affect: Mood normal. well. Will consider discontinuation of the metoprolol as her heart rate remains low at 59. She is advised to continue to monitor and call if her heart rate rises above 90.          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     PT UNDERSTANDS AND AGREES TO FOLLOW DIRECTIONS AND ADVICE    Orders Placed This Encounter      Comp [employer]

## 2021-12-03 NOTE — ASSESSMENT & PLAN NOTE
Patient continues to have significant issues with focus, attention and completion of tasks. Worsening fluctuations in her blood pressure in addition. MRI of the brain, follow-up with neurology discussed. Continue on Adderall at this time.   May need evalu

## 2021-12-03 NOTE — ASSESSMENT & PLAN NOTE
Patient is currently on levothyroxine 125 mcg daily. She has been compliant with her medications. Recheck labs with the next blood draw.

## 2021-12-03 NOTE — ASSESSMENT & PLAN NOTE
Patient with generalized aches and pains especially in the shoulder and limb girdle. Labs did indicate persistent elevation in the KEILA as well as CRP.   She was seen by rheumatology and given treatment of prednisone–20 mg daily which seemed to have resolved

## 2021-12-03 NOTE — ASSESSMENT & PLAN NOTE
Fluctuating blood pressures, palpitations, lightheadedness and syncopal episodes. Symptoms seem to be worse after COVID infection. She has had history of pancolitis, abdominal pain and diarrhea which seem to have worsened the situation.   Echocardiogram, CT

## 2021-12-03 NOTE — ASSESSMENT & PLAN NOTE
Patient has been off the Metformin due to severe diarrhea. She is currently on Januvia 100 mg daily and Jardiance 10 mg daily. Blood sugars have improved significantly. She has not had any low sugar reactions.   Since starting on prednisone for PMR-like r

## 2021-12-03 NOTE — ASSESSMENT & PLAN NOTE
Blood pressure 139/84, pulse 59, temperature 97.1 °F (36.2 °C), temperature source Temporal, resp. rate 18, height 5' 2\" (1.575 m), weight 196 lb (88.9 kg), not currently breastfeeding. Recheck blood pressure was 128/64.   Patient has been off valsartan h

## 2021-12-03 NOTE — PATIENT INSTRUCTIONS
Problem List Items Addressed This Visit        Unprioritized    Essential hypertension     Blood pressure 139/84, pulse 59, temperature 97.1 °F (36.2 °C), temperature source Temporal, resp.  rate 18, height 5' 2\" (1.575 m), weight 196 lb (88.9 kg), not cur history of pancolitis, abdominal pain and diarrhea which seem to have worsened the situation. Echocardiogram, CT chest done recently without any significant abnormalities. Holter monitor has been requested.   We will follow-up after evaluation per neurolo

## 2021-12-09 NOTE — PATIENT INSTRUCTIONS
You were seen for possible PMR, polymalgia rheumatica  Plan to start prednisone again 15 mg daily for 2 weeks then 12.5 mg daily for 2 weeks stay on 10 mg daily  Will see you again in 6 weeks

## 2021-12-09 NOTE — PROGRESS NOTES
Mary Hernandez is a 49year old female. HPI:   Patient presents with:  Results: Labs and Xrays  Back Pain  Medication Follow-Up      I had the pleasure of seeing Mary Hernandez on 12/8/2021 for follow up +KEILA and polyarthralgia and myalgia.      Cu and trochanteric bursa injections, they helped for about 2 to 3 months  She received her Covid vaccine in September 2021 afterwards developed worsening joint pain and muscle pain.   She would also have fluctuations in her blood pressure where her blood pres a Holter monitor       HISTORY:  Past Medical History:   Diagnosis Date   • Allergic rhinitis 2010   • Anxiety state, unspecified    • Bowel habit changes     colonoscopy 2010   • Cholelithiasis     cholecystectomy 2009   • COVID-19    • Diabetes ([facility] 75.) 0.5 MG Oral Tab Take 1 tablet (0.5 mg total) by mouth nightly as needed (insomnia).  30 tablet 3   • JANUVIA 100 MG Oral Tab TAKE 1 TABLET(100 MG) BY MOUTH DAILY 90 tablet 1   • Dicyclomine HCl 10 MG Oral Cap Take 1 capsule (10 mg total) by mouth 3 (three) FROM, no warmth or effusion present. No pain with ROM. Ankles: FROM, no pain or swelling or warmth on palpation  Feet: no pain with MTP squeeze, no toe swelling or pain or warmth on palpation with FROM  Spine: no lumbar or sacral pain on palpation.   NEUR symptoms  - Further blood work showed a negative FEDE panel, normal RF and CCP. CRP was slightly elevated at 0.83.   Due to her continued shoulder, hip stiffness placed her on prednisone 20 mg daily for 2 weeks to see if would help her symptoms, possible PM

## 2022-01-05 NOTE — PROGRESS NOTES
[facility]  [address]  [phone]              Date: January 5, 2022  Patient Name: Mayra Lou   MRN: [id number]    Reason for Evaluation: Paresthesias     HPI:     Mayra Lou is since COVID or vaccine. She describes a sensation of \"something dripping\" on her scalp. Admits to head pressure. She feels her left side has always been weaker since an accident when she was 27.       Facial paresthesias: along V2 aspects bilateral mg total) by mouth 3 (three) times daily as needed. , Disp: 30 capsule, Rfl: 1  Clobetasol Propionate 0.05 % External Ointment, , Disp: , Rfl:   lidocaine-menthol 4-1 % External Patch, Place 1 patch onto the skin daily as needed. , Disp: 7 patch, Rfl: 0  Flu SURGICAL HISTORY  1996    endiometrosis   • TONSILLECTOMY  1979   • TONSILLECTOMY  1979   • TOTAL ABDOM HYSTERECTOMY         SOCIAL HISTORY  Updated     FAMILY HISTORY  Family History   Problem Relation Age of Onset   • Heart Attack Father 76        MI   • confrontation  CN III, CN IV, CN VI (Extraocular movements): intact.     CN V:   Vibratory splitting in left V1, decreased to PP and light touch in L V2-3   CN VII: normal facial muscle strength bilaterally  CN VIII: auditory acuity intact to bedside testin enhancement. 4. Small benign pineal region cyst redemonstrated.         I PERSONALLY REVIEWED THESE IMAGES AND AGREE WITH THE IMPRESSION.      ASSESSMENT:  The patient is a 49year old woman with past medical history of migraines with Fioricet PRN, COVID advised to let my office know if they have any questions or concerns.      Today, I personally spent 60 minutes in this case, including chart review, time spent with patient doing face to face evaluation w/ interview and exam and patient education, counseli

## 2022-01-05 NOTE — PATIENT INSTRUCTIONS
-Please obtain laboratory blood work   -Speech cognitive therapy referral   -EEG   -Follow up in 1 month or sooner if needed.     -If you develop sudden onset loss of vision, double vision, room spinning/world spinning sensation, inability to speak or under

## 2022-01-08 NOTE — TELEPHONE ENCOUNTER
Refill passed per [facility] protocol.     Requested Prescriptions   Pending Prescriptions Disp Refills    METOPROLOL SUCCINATE 25 MG Oral Tablet 24 Hr [Pharmacy Med Name: METOPROLOL ER SUCCINATE 25MG TABS] 30 tablet 3     Sig: TAKE 1 TABLET BY MOUTH DAILY AT BEDTIME        Hypertensive Medications Protocol Passed - 1/8/2022  4:01 AM        Passed - CMP or BMP in past 12 months        Passed - Appointment in past 6 or next 3 months        Passed - GFR Non- > 50     Lab Results   Component Value Date    GFRNAA 99 01/04/2022                     Future Appointments         Provider Department Appt Notes    In 1 week June Achilles, [facility], [facility],[address], [place] Prednisone treatment    In 2 months Anca Han MD [facility] [place], [address] [facility],[address], [place] POTS          Recent Outpatient Visits              3 days ago Cognitive change    [facility], [place], [place]    Office Visit    1 month ago [facility] [address] [place] for Leny Nolen [place], MD    Office Visit    1 month ago Type 2 diabetes mellitus without complication, without long-term current use of insulin [facility])    [address] [place] Mikayla John, [address] [facility],[address], Teresa Prescott MD    Office Visit    1 month ago Positive KEILA (antinuclear antibody)    [facility], [address], Mia Barrientos MD    Office Visit    2 months ago RAMOS (dyspnea on exertion)    [facility], Teresa Prescott MD    Office Visit

## 2022-01-19 NOTE — PATIENT INSTRUCTIONS
You were seen today for joint pain possible PMR  Plan to taper prednisone 5 mg daily for 2 weeks then prednisone 4 mg daily for 2 weeks then prednisone 3 mg daily for 2 weeks then prednisone 2 mg daily for 2 weeks then prednisone 1 mg daily for 2 weeks the

## 2022-01-19 NOTE — PROGRESS NOTES
Leslie Slade is a 49year old female. HPI:   Patient presents with:  Medication Follow-Up      I had the pleasure of seeing Leslie Slade on 1/19/2022 for follow up +KEILA and polyarthralgia and myalgia, possible PMR.      Current Medications:  Pr received left shoulder and trochanteric bursa injections, they helped for about 2 to 3 months  She received her Covid vaccine in September 2021 afterwards developed worsening joint pain and muscle pain.   She would also have fluctuations in her blood pressu feel dizzy.   Will be getting a Holter monitor    Today, 1/19/2022:  Presents for f/u of possible PMR  Was started on prednisone 15 mg daily with a tapering dose  Now down to prednisone 5 mg daily  Pain was better in the shoulders and hips at a higher dose daily for 2 weeks then 12.5 mg daily for 2 weeks stay on 10 mg daily (Patient taking differently: Take 5 mg by mouth daily.  With 5 mg pills, Start 15 mg daily for 2 weeks then 12.5 mg daily for 2 weeks stay on 10 mg daily) 90 tablet 0   • ONETOUCH ULTRA In Left side weakness             Other reaction(s): weakness      ROS:   All other ROS are negative. PHYSICAL EXAM:   GEN: AAOx3, NAD  HEENT: EOMI, PERRLA, no injection or icterus, oral mucosa moist, no oral lesions. No lymphadenopathy.  No facial rash  C narrowing.    No acute fracture, dislocation or marrow replacing lesion within lumbar spine.   No abnormal enhancement.     MRI T spine:  Mild endplate degenerative changes throughout the thoracic spine without high-grade canal or foraminal narrowing.     M

## 2022-01-21 NOTE — PROCEDURES
EEG report    REFERRING PHYSICIAN: Juliana Ruiz DO    PCP and phone number:  Efren Retana MD  [phone]    TECHNIQUE: 21 channels of EEG, 2 channels of EOG, and 1 channel of EKG were recorded utilizing the International 10/20 System.  The recordi Start 15 mg daily for 2 weeks then 12.5 mg daily for 2 weeks stay on 10 mg daily (Patient taking differently: Take 5 mg by mouth daily.  With 5 mg pills, Start 15 mg daily for 2 weeks then 12.5 mg daily for 2 weeks stay on 10 mg daily), Disp: 90 tablet, Rfl ACTIVATION:  Hyperventilation: Done no abnormalities noted  Photic stimulation: Done, no abnormalities noted  Sleep: Normal sleep architecture was seen.     BACKGROUND  While the patient was awake, the posterior dominant rhythm consisted of well-regulat

## 2022-01-31 NOTE — TELEPHONE ENCOUNTER
Spoke with pt. She had been trying to call for over an hr to cancel appt -- her medical transport fell through and she was not able to make it today. Confirmed next appt.

## 2022-02-01 NOTE — TELEPHONE ENCOUNTER
Please review. Protocol failed/ No protocol      Requested Prescriptions   Pending Prescriptions Disp Refills    clonazePAM 0.5 MG Oral Tab 30 tablet 3     Sig: Take 1 tablet (0.5 mg total) by mouth nightly as needed (insomnia).         There is no refill protocol information for this order           Future Appointments         Provider Department Appt Notes    Tomorrow Alycia Salmeron 3073 Speech Pathology SEND TO [place];   [facility]    In 6 days Adron Kalyan Stallingsndu 30[address] Speech Pathology auth???; [facility]    In 1 week Adron Rajeev Stallingssandu [address] Speech Pathology [facility]    In 1 week Adron Rajeev Stall[address]u [address] Speech Pathology [facility]    [facility]    In 2 weeks Adron Kalyan Stallingsndu [address] Speech Pathology [facility]             Recent Outpatient Visits              1 week ago PMR (polymyalgia rheumatica) [facility])    [facility], [address], Elena Meza MD    Office Visit    3 weeks ago Cognitive change    [address] [place], [place]    Office Visit    1 month ago [address], [address], Sindy Cruz MD    Office Visit    2 months ago Type 2 diabetes mellitus without complication, without long-term current use of insulin [facility])    [address] [facility] [place], [address], Kenzie Dean MD    Office Visit    2 months ago Positive KEILA (antinuclear antibody)    [facility] for Lolis Padilla MD    Office Visit

## 2022-02-03 NOTE — TELEPHONE ENCOUNTER
From: Ricky Harmon  To: Jaime June MD  Sent: 2/3/2022 1:29 PM CST  Subject: Visit    Dear Dr Linwood Lerma ,  I received a message from  Your office letting me know that my appointment to see you on March 22 will be canceled because you will no longer be with [facility] if I understood well about you leaving us? I was wondering if you please can squeeze me in to see me at any time any day. I was supposed to see you on February but there was nothing available. I will also ask you to please give me refill for Aderall and hydrocodone. Thanks, blessings!   Radha Weems

## 2022-02-04 NOTE — TELEPHONE ENCOUNTER
Please review refill failed/no protocol - Patient had appointment scheduled 3/22/22, cancelled by clinic. Requested Prescriptions     Pending Prescriptions Disp Refills    HYDROcodone-acetaminophen 5-325 MG Oral Tab 45 tablet 0     Sig: Take 1 tablet by mouth 2 (two) times daily as needed for Pain. amphetamine-dextroamphetamine (ADDERALL) 15 MG Oral Tab 30 tablet 0     Sig: Take 1 tablet (15 mg total) by mouth daily. amphetamine-dextroamphetamine (ADDERALL) 15 MG Oral Tab 30 tablet 0     Sig: Take 1 tablet (15 mg total) by mouth daily. amphetamine-dextroamphetamine (ADDERALL) 15 MG Oral Tab 30 tablet 0     Sig: Take 1 tablet (15 mg total) by mouth daily. Recent Visits  Date Type Provider Dept   12/02/21 Office Visit Maria Elena Noel MD [facility]   10/14/21 Office Visit MD [facility]   09/16/21 Office Visit Maria Elena Noel MD [facility]   06/01/21 Office Visit Maria Elena Noel MD [facility]   09/25/20 Office Visit Maria Elena Noel MD [facility]   Showing recent visits within past 540 days with a meds authorizing provider and meeting all other requirements  Future Appointments  No visits were found meeting these conditions. Showing future appointments within next 150 days with a meds authorizing provider and meeting all other requirements    Requested Prescriptions   Pending Prescriptions Disp Refills    HYDROcodone-acetaminophen 5-325 MG Oral Tab 45 tablet 0     Sig: Take 1 tablet by mouth 2 (two) times daily as needed for Pain. There is no refill protocol information for this order        amphetamine-dextroamphetamine (ADDERALL) 15 MG Oral Tab 30 tablet 0     Sig: Take 1 tablet (15 mg total) by mouth daily. There is no refill protocol information for this order        amphetamine-dextroamphetamine (ADDERALL) 15 MG Oral Tab 30 tablet 0     Sig: Take 1 tablet (15 mg total) by mouth daily.         There is no refill protocol information for this order        amphetamine-dextroamphetamine (ADDERALL) 15 MG Oral Tab 30 tablet 0     Sig: Take 1 tablet (15 mg total) by mouth daily. There is no refill protocol information for this order         Future Appointments         Provider Department Appt Notes    In 3 days Alycia Johnson Speech Pathology SEND TO [place];   [facility] [place]    In 5 days Alycia Johnson [address] Speech Pathology [place] [address]? ??  [facility]    In 1 week Alycia Johnson Speech Pathology [facility]    In 1 week [facility]    In 1 week Alycia Johnson Speech Pathology [facility] [place]          Recent Outpatient Visits              2 weeks ago PMR (polymyalgia rheumatica) [facility])    [facility], [address], Rell Meza MD    Office Visit    1 month ago [place], [place]    Office Visit    1 month ago [address] [place] [address] for Candice Davis MD    Office Visit    2 months ago Type 2 diabetes mellitus without complication, without long-term current use of insulin [facility])    [facility], [address], Rhona Gutierrez MD    Office Visit    2 months ago Positive KEILA (antinuclear antibody)    [facility] Candice Davis [facility]    Office Visit

## 2022-02-07 NOTE — TELEPHONE ENCOUNTER
Please Review. Protocol Failed or has no protocol.        Requested Prescriptions   Pending Prescriptions Disp Refills    JANUVIA 100 MG Oral Tab [Pharmacy Med Name: Puma Pippins 100MG TABLETS] 90 tablet 1     Sig: TAKE 1 TABLET(100 MG) BY MOUTH DAILY        Diabetes Medication Protocol Failed - 2/4/2022  5:56 AM        Failed - Last A1C < 7.5 and within past 6 months     Lab Results   Component Value Date    A1C 7.7 (H) 09/20/2021               Passed - Appointment in past 6 or next 3 months        Passed - GFR Non- > 50     Lab Results   Component Value Date    GFRNAA 99 01/04/2022                 Passed - GFR in the past 12 months           LEVOTHYROXINE 125 MCG Oral Tab [Pharmacy Med Name: LEVOTHYROXINE 0.125MG (125MCG) TAB] 90 tablet 1     Sig: TAKE 1 TABLET(125 MCG) BY MOUTH BEFORE BREAKFAST        Thyroid Medication Protocol Failed - 2/4/2022  5:56 AM        Failed - Last TSH value is normal     Lab Results   Component Value Date    TSH 0.220 (L) 09/20/2021    THYROIDFUNC 20.91 (H) 05/22/2016                 Passed - TSH in past 12 months        Passed - Appointment in past 12 or next 3 months            Recent Outpatient Visits              2 weeks ago PMR (polymyalgia rheumatica) [facility])    [facility], [address], Mis Meza MD    Office Visit    1 month ago Cognitive change    [address] [place], [place]    Office Visit    2 months ago [address] for [address] [place] [address], Cl Durán MD    Office Visit    2 months ago Type 2 diabetes mellitus without complication, without long-term current use of insulin [facility])    [facility], [address], Haseeb Ram MD    Office Visit    2 months ago Positive KEILA (antinuclear antibody)    [facility] for Greg MD Marty    Office Visit          Future Appointments         Provider Department Appt Notes    In 1 week Alycia Bach 30[address] Speech Pathology [facility]    [facility]    In 1 week Alycia Bach [address] Speech Pathology [facility]

## 2022-02-15 NOTE — TELEPHONE ENCOUNTER
Patient has an appt for tomorrow but she due to some transportation issues she isn't starting her therapy until tomorrow evening. Should she not come tomorrow for her appointment and wait till she has had therapy done.

## 2022-02-15 NOTE — TELEPHONE ENCOUNTER
Keyla Page, DO  You 4 minutes ago (1:30 PM)     SS    Hello,     Yes, that is fine - she can wait to follow up until she has done cognitive speech therapy - thanks! Spoke to patient and notified her of above. She was understanding. Cancelled appointment for tomorrow. She states that she will schedule a follow-up appointment through [facility].

## 2022-02-17 NOTE — PROGRESS NOTES
THERAPY SESSION NOTE     Diagnosis: Cognitive change (R41.89)  Authorized # of Visits: 6   # Visits Remainin  Certification Ends: 15/59/1827          Fall Risk: standard        Precautions: Covid PPE          Pain Rating: pt reports no pain 0/10  Subjective: Pt arrived on time to session. Friendly and cooperative demeanor. Requests appointments once per week or twice per month d/t transportation issues. Objective:   CognitiveTherapy    Date: 22  Tx#: 2 Date: Tx#:  Date: Tx#: Date: Tx#:  Date: Tx#:    Working Memory           Auditory Comprehension           External Aids Discussed importance of use of external aids for memory  To-do list, incl visual feedback  Daily memory log, highlighting of pertinent information, visualization and review the following day  Pt reported some difficulty recalling tasks she had done the day prior         Executive Function         Compensatory Strategies Trained and discussed use of word finding log, especially as pertaining to pt's work. 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 Pt is a master's graduate, mother, and also planning large event as well as involved in a legal case. She expresses significant stress and we discussed strategies for managing pt's activities. Pt is exteremly motivated and completed all HEP. Assigned WF log, memory log/memory external aids and cognitive breaks for HEP. Goals: (to be met in 8 visits)   Pt will recall 5/5 unrelated items immediately and with 10 minute delay with use of trained strategies.   Pt will respond to A/C questions after listening to a short paragraph with 80% accuracy id'ly. Pt will independently implement use of external aids for memory. Pt will complete mod-complex executive function tasks targeting problem solving, visual/verbal reasoning, organization, sequencing. Pt will implement compensatory strategies for executive function independently. Plan: Continue therapy per goals above. Continue HEP tasks daily.     Skilled Services: Speech Therapy     Charges: [id number]         Total Treatment Time: 45 min    Dora Miller MS CF-SLP   Speech Language Pathologist   [facility]   [phone]

## 2022-03-02 PROBLEM — Z28.310: Status: ACTIVE | Noted: 2022-03-02

## 2022-03-02 PROBLEM — N39.0 UTI (URINARY TRACT INFECTION): Status: RESOLVED | Noted: 2017-03-03 | Resolved: 2022-03-02

## 2022-03-02 PROBLEM — Z28.09: Status: ACTIVE | Noted: 2022-03-02

## 2022-03-02 NOTE — ASSESSMENT & PLAN NOTE
Patient with problems with focus, attention, concentration problems. Has been seen by neurology. MRI brain without any significant abnormalities. Reviewed has been on Adderall. Continue on the same medications.

## 2022-03-02 NOTE — ASSESSMENT & PLAN NOTE
Fluctuating blood pressures, palpitations, lightheadedness and syncopal episodes when evaluated in October and December. There is symptoms could have been a complication of post viral pots or secondary to the COVID-19 vaccine as occurred after her COVID-19 vaccine on September 20. She also noticed worsening diarrhea around the time. GI work-up has been completed. Holter monitor did not show any significant abnormalities. Echocardiogram and CT scan without any significant abnormalities. Diarrhea is now improved. She has been on prednisone and has noted the difference. But since tapering down on the prednisone she may be having a little more frequent diarrhea. She will discuss this with gastroenterology. Blood pressure however has risen quite a bit. Unsure if this is the effect of steroids or return to her old blood pressures after discontinuation of antihypertensives. She is on metoprolol at this time. Added valsartan 80 mg daily. Will hold off all diuretics. Monitor blood pressures closely. Advised to keep well-hydrated and return for follow-up blood pressure check in about 1 week.

## 2022-03-02 NOTE — ASSESSMENT & PLAN NOTE
At patient's last visit in December 2021 she had severe generalized aches and pains are across her shoulder and limb-girdle. She did have persistent elevations in her KEILA and CRP. She was evaluated per rheumatology and was started on prednisone which resolved almost 80 to 85% of her pain and fatigue. She has been gradually tapered down and she has noticed recurrence of aching pain in her muscles especially at the shoulder and the hips. She will follow-up with rheumatology to discuss further management.

## 2022-03-02 NOTE — ASSESSMENT & PLAN NOTE
Daily function test have been stable on levothyroxine at 125 mcg daily. Continue on the same dose of medication.

## 2022-03-02 NOTE — ASSESSMENT & PLAN NOTE
Advised to complete vitamin D at 50,000 units once a week and then transition to over-the-counter vitamin D 2000 units daily.

## 2022-03-02 NOTE — ASSESSMENT & PLAN NOTE
Chronic anxiety and depressive disorder from multitude of medical issues. Off-and-on agitation due to confusion and difficulty with concentration and focus. No hallucinations, delusions, suicidal actions, thoughts or intent. Continue with clonazepam as directed. Refills provided. May benefit from additional Cymbalta.

## 2022-03-03 NOTE — PROGRESS NOTES
THERAPY SESSION NOTE     Diagnosis: Cognitive change (R41.89)  Authorized # of Visits: 6   # Visits Remaining: 3  Certification Ends: 39/43/7071          Fall Risk: standard        Precautions: Covid PPE          Pain Rating: pt reports no pain 0/10  Subjective: Pt arrived on time to session. Friendly and cooperative demeanor. Objective:   CognitiveTherapy    Date: 02/16/22  Tx#: 2 Date: 03/02/22  Tx#: 3 Date: Tx#: Date: Tx#:  Date: Tx#:    Working Memory  Trained delayed recall for [employer]   Trained shifting attention strategies         External Aids Discussed importance of use of external aids for memory  To-do list, incl visual feedback  Daily memory log, highlighting of pertinent information, visualization and review the following day  Pt reported some difficulty recalling tasks she had done the day prior Trained goal management training, introductory     Pt completes memory log daily. Modified to make it shorter/more manageable    She also implemented to-do list         Executive Function   Reports difficulty with divided attention. He discussed strategies for this. Compensatory Strategies Trained and discussed use of word finding log, especially as pertaining to pt's work. 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 Pt is exteremly motivated and completed all HEP. Discussed multiple strategies for WF, attention, cognitive breaks, memory recall. Assigned WF log, memory log/memory external aids, daily goal setting, and cognitive breaks for HEP.      Goals: (to be met in 8 visits) Pt will recall 5/5 unrelated items immediately and with 10 minute delay with use of trained strategies. Pt will respond to A/C questions after listening to a short paragraph with 80% accuracy id'ly. Pt will independently implement use of external aids for memory. Pt will complete mod-complex executive function tasks targeting problem solving, visual/verbal reasoning, organization, sequencing. Pt will implement compensatory strategies for executive function independently. Plan: Continue therapy per goals above. Continue HEP tasks daily.     Skilled Services: Speech Therapy     Charges: [id number]         Total Treatment Time: 45 min    Sampson Turner MS CF-SLP   Speech Language Pathologist   [facility]   [phone]

## 2022-03-09 NOTE — TELEPHONE ENCOUNTER
Refill passed per [email or web address] [facility] protocol.   Listed as Pt reported/External    Requested Prescriptions   Pending Prescriptions Disp Refills    ONETOUCH ULTRA In Vitro Strip [place] [facility] Name: ONE TOUCH ULTRA BLUE TEST STR 25'S] 25 strip 0     Sig: TEST BLOOD SUGAR ONCE DAILY        Diabetic Supplies Protocol Passed - 3/9/2022  4:01 AM        Passed - Appointment in past 12 or next 3 months              Recent Outpatient Visits              Today     [facility], [facility], [address], [place]    Nurse Only    1 week ago Type 2 diabetes mellitus without complication, without long-term current use of insulin [facility])    [address] [facility], [place]    Office Visit    1 week ago     Misty [address], SLP    Office Visit    3 weeks ago     Misty Turner, SLP    Office Visit    3 weeks ago Cognitive change    [address] [facility] Pathology Fang Olivas, SLP    Office Visit

## 2022-03-09 NOTE — PROGRESS NOTES
Pt. Came in for BP check. Name,   Order was verified. Pt. Sat for a few minutes while we went over medications which are Valsartan 80mg., Metoprolol 25 mg.  BP was 124/74 and P72. Pt. Could not wait for me to talk to doctor so I said continue doing what she is doing and we will contact her if doctor wants to do something different.

## 2022-03-16 NOTE — TELEPHONE ENCOUNTER
Dr. Liset Hernández, Pharmacy message:  [employer] does not cover medication at this time due to previous rx for [facility] within the last 30 days. Should we start PA?

## 2022-03-17 NOTE — TELEPHONE ENCOUNTER
Other      Payer:  [employer] P[employer]   Sorry but [employer] cannot process this PA request electronically. Please refer to the original instructions from the [facility] for information on how to process this prior authorization manually. Called [employer] and spoke to [facility]. He is send over a new paper form to complete. Await fax.

## 2022-04-05 ENCOUNTER — TELEPHONE (OUTPATIENT)
Dept: GASTROENTEROLOGY | Facility: CLINIC | Age: 57
End: 2022-04-05

## 2022-04-05 NOTE — TELEPHONE ENCOUNTER
Patient outreach message received:    1 year EGD recall entered into patient outreach in [address]. Next EGD will be due 6/28/2022. Recall reminder letter mailed out to patient.

## 2022-05-18 ENCOUNTER — OFFICE VISIT (OUTPATIENT)
Dept: RHEUMATOLOGY | Facility: CLINIC | Age: 57
End: 2022-05-18
Payer: MEDICAID

## 2022-05-18 ENCOUNTER — LAB ENCOUNTER (OUTPATIENT)
Dept: LAB | Facility: HOSPITAL | Age: 57
End: 2022-05-18
Attending: INTERNAL MEDICINE
Payer: MEDICAID

## 2022-05-18 VITALS
HEIGHT: 62 IN | WEIGHT: 196 LBS | DIASTOLIC BLOOD PRESSURE: 85 MMHG | SYSTOLIC BLOOD PRESSURE: 131 MMHG | BODY MASS INDEX: 36.07 KG/M2 | HEART RATE: 61 BPM

## 2022-05-18 DIAGNOSIS — M25.50 POLYARTHRALGIA: ICD-10-CM

## 2022-05-18 DIAGNOSIS — M35.3 PMR (POLYMYALGIA RHEUMATICA) (HCC): Primary | ICD-10-CM

## 2022-05-18 DIAGNOSIS — M79.10 MYALGIA: ICD-10-CM

## 2022-05-18 LAB
ALBUMIN SERPL-MCNC: 3.7 G/DL (ref 3.4–5)
ALBUMIN/GLOB SERPL: 0.9 {RATIO} (ref 1–2)
ALP LIVER SERPL-CCNC: 105 U/L
ALT SERPL-CCNC: 21 U/L
ANION GAP SERPL CALC-SCNC: 5 MMOL/L (ref 0–18)
AST SERPL-CCNC: 25 U/L (ref 15–37)
BASOPHILS # BLD AUTO: 0.03 X10(3) UL (ref 0–0.2)
BASOPHILS NFR BLD AUTO: 0.4 %
BILIRUB SERPL-MCNC: 0.2 MG/DL (ref 0.1–2)
BUN BLD-MCNC: 15 MG/DL (ref 7–18)
BUN/CREAT SERPL: 18.3 (ref 10–20)
CALCIUM BLD-MCNC: 8.6 MG/DL (ref 8.5–10.1)
CHLORIDE SERPL-SCNC: 105 MMOL/L (ref 98–112)
CO2 SERPL-SCNC: 28 MMOL/L (ref 21–32)
CREAT BLD-MCNC: 0.82 MG/DL
CRP SERPL-MCNC: 1.05 MG/DL (ref ?–0.3)
DEPRECATED RDW RBC AUTO: 45 FL (ref 35.1–46.3)
EOSINOPHIL # BLD AUTO: 0.17 X10(3) UL (ref 0–0.7)
EOSINOPHIL NFR BLD AUTO: 2.1 %
ERYTHROCYTE [DISTWIDTH] IN BLOOD BY AUTOMATED COUNT: 14.7 % (ref 11–15)
ERYTHROCYTE [SEDIMENTATION RATE] IN BLOOD: 10 MM/HR
FASTING STATUS PATIENT QL REPORTED: NO
GLOBULIN PLAS-MCNC: 3.9 G/DL (ref 2.8–4.4)
GLUCOSE BLD-MCNC: 98 MG/DL (ref 70–99)
HCT VFR BLD AUTO: 42.3 %
HGB BLD-MCNC: 13.1 G/DL
IMM GRANULOCYTES # BLD AUTO: 0.04 X10(3) UL (ref 0–1)
IMM GRANULOCYTES NFR BLD: 0.5 %
LYMPHOCYTES # BLD AUTO: 2.27 X10(3) UL (ref 1–4)
LYMPHOCYTES NFR BLD AUTO: 28.7 %
MCH RBC QN AUTO: 26.1 PG (ref 26–34)
MCHC RBC AUTO-ENTMCNC: 31 G/DL (ref 31–37)
MCV RBC AUTO: 84.3 FL
MONOCYTES # BLD AUTO: 0.54 X10(3) UL (ref 0.1–1)
MONOCYTES NFR BLD AUTO: 6.8 %
NEUTROPHILS # BLD AUTO: 4.87 X10 (3) UL (ref 1.5–7.7)
NEUTROPHILS # BLD AUTO: 4.87 X10(3) UL (ref 1.5–7.7)
NEUTROPHILS NFR BLD AUTO: 61.5 %
OSMOLALITY SERPL CALC.SUM OF ELEC: 287 MOSM/KG (ref 275–295)
PLATELET # BLD AUTO: 249 10(3)UL (ref 150–450)
POTASSIUM SERPL-SCNC: 3.8 MMOL/L (ref 3.5–5.1)
PROT SERPL-MCNC: 7.6 G/DL (ref 6.4–8.2)
RBC # BLD AUTO: 5.02 X10(6)UL
SODIUM SERPL-SCNC: 138 MMOL/L (ref 136–145)
T4 FREE SERPL-MCNC: 1.1 NG/DL (ref 0.8–1.7)
TSI SER-ACNC: 3.82 MIU/ML (ref 0.36–3.74)
WBC # BLD AUTO: 7.9 X10(3) UL (ref 4–11)

## 2022-05-18 PROCEDURE — 84439 ASSAY OF FREE THYROXINE: CPT | Performed by: INTERNAL MEDICINE

## 2022-05-18 PROCEDURE — 85025 COMPLETE CBC W/AUTO DIFF WBC: CPT | Performed by: INTERNAL MEDICINE

## 2022-05-18 PROCEDURE — 85652 RBC SED RATE AUTOMATED: CPT | Performed by: INTERNAL MEDICINE

## 2022-05-18 PROCEDURE — 36415 COLL VENOUS BLD VENIPUNCTURE: CPT | Performed by: INTERNAL MEDICINE

## 2022-05-18 PROCEDURE — 86140 C-REACTIVE PROTEIN: CPT | Performed by: INTERNAL MEDICINE

## 2022-05-18 PROCEDURE — 80053 COMPREHEN METABOLIC PANEL: CPT | Performed by: INTERNAL MEDICINE

## 2022-05-18 PROCEDURE — 84443 ASSAY THYROID STIM HORMONE: CPT | Performed by: INTERNAL MEDICINE

## 2022-05-18 RX ORDER — PREDNISONE 1 MG/1
5 TABLET ORAL DAILY
Qty: 30 TABLET | Refills: 0 | Status: SHIPPED | OUTPATIENT
Start: 2022-05-18

## 2022-05-18 RX ORDER — PREDNISONE 1 MG/1
TABLET ORAL
Qty: 200 TABLET | Refills: 0 | Status: SHIPPED | OUTPATIENT
Start: 2022-05-18

## 2022-05-18 NOTE — PATIENT INSTRUCTIONS
You were seen today for possible PMR which causes stiffness in shoulders and hips  Start prednison 5 mg daily for 1 month then 4 mg daily for 1 month then 3 mg daily for 1 mos then 2 mg daily for 1 month then 1 mg daily for 1 month and then stop  See me in 2 mos

## 2022-07-14 ENCOUNTER — TELEPHONE (OUTPATIENT)
Dept: GASTROENTEROLOGY | Facility: CLINIC | Age: 57
End: 2022-07-14

## 2022-07-14 RX ORDER — OMEPRAZOLE 20 MG/1
20 CAPSULE, DELAYED RELEASE ORAL EVERY MORNING
Qty: 90 CAPSULE | Refills: 0 | Status: SHIPPED | OUTPATIENT
Start: 2022-07-14

## 2022-07-20 ENCOUNTER — HOSPITAL ENCOUNTER (OUTPATIENT)
Dept: GENERAL RADIOLOGY | Facility: HOSPITAL | Age: 57
Discharge: HOME | End: 2022-07-20
Attending: INTERNAL MEDICINE
Payer: MEDICAID

## 2022-07-20 ENCOUNTER — OFFICE VISIT (OUTPATIENT)
Dept: RHEUMATOLOGY | Facility: CLINIC | Age: 57
End: 2022-07-20
Payer: MEDICAID

## 2022-07-20 VITALS
WEIGHT: 200 LBS | BODY MASS INDEX: 36.8 KG/M2 | DIASTOLIC BLOOD PRESSURE: 76 MMHG | HEART RATE: 61 BPM | SYSTOLIC BLOOD PRESSURE: 125 MMHG | HEIGHT: 62 IN

## 2022-07-20 DIAGNOSIS — G89.29 CHRONIC LEFT-SIDED LOW BACK PAIN WITH LEFT-SIDED SCIATICA: ICD-10-CM

## 2022-07-20 DIAGNOSIS — M79.10 MYALGIA: ICD-10-CM

## 2022-07-20 DIAGNOSIS — M54.42 CHRONIC LEFT-SIDED LOW BACK PAIN WITH LEFT-SIDED SCIATICA: ICD-10-CM

## 2022-07-20 DIAGNOSIS — M54.42 CHRONIC LEFT-SIDED LOW BACK PAIN WITH LEFT-SIDED SCIATICA: Primary | ICD-10-CM

## 2022-07-20 DIAGNOSIS — M79.18 MYOFASCIAL PAIN SYNDROME: ICD-10-CM

## 2022-07-20 DIAGNOSIS — G89.29 CHRONIC LEFT-SIDED LOW BACK PAIN WITH LEFT-SIDED SCIATICA: Primary | ICD-10-CM

## 2022-07-20 DIAGNOSIS — M35.3 PMR (POLYMYALGIA RHEUMATICA) (HCC): ICD-10-CM

## 2022-07-20 PROCEDURE — 99214 OFFICE O/P EST MOD 30 MIN: CPT | Performed by: INTERNAL MEDICINE

## 2022-07-20 PROCEDURE — 3078F DIAST BP <80 MM HG: CPT | Performed by: INTERNAL MEDICINE

## 2022-07-20 PROCEDURE — 3008F BODY MASS INDEX DOCD: CPT | Performed by: INTERNAL MEDICINE

## 2022-07-20 PROCEDURE — 3074F SYST BP LT 130 MM HG: CPT | Performed by: INTERNAL MEDICINE

## 2022-07-20 PROCEDURE — 72110 X-RAY EXAM L-2 SPINE 4/>VWS: CPT | Performed by: INTERNAL MEDICINE

## 2022-07-20 RX ORDER — HYDROXYCHLOROQUINE SULFATE 200 MG/1
400 TABLET, FILM COATED ORAL DAILY
Qty: 180 TABLET | Refills: 0 | Status: SHIPPED | OUTPATIENT
Start: 2022-07-20

## 2022-07-20 NOTE — PATIENT INSTRUCTIONS
You were seen today for joint pain, ? inflammatoary arthritis vs fibromyalgia  Taper prednisone 2 mg daily for 1 month and 1 mg daily for 1 month then stop  Start plaquenil 1 pill daily for 2 weeks then increase to 2 pill daily   Xray of lower spine  Then will consider low dose naltrexone if the plaquenil not working

## 2022-10-07 RX ORDER — DICYCLOMINE HYDROCHLORIDE 10 MG/1
CAPSULE ORAL
Qty: 30 CAPSULE | Refills: 0 | Status: SHIPPED | OUTPATIENT
Start: 2022-10-07

## 2022-10-07 NOTE — TELEPHONE ENCOUNTER
Refill sent for short term per protocol. Patient needs to schedule annual office visit for further refills. Mychart was already sent by Mounika Clemente informing patient that appointment needed. I also put that appointment needed in pharmacy notes.

## 2022-10-24 RX ORDER — HYDROXYCHLOROQUINE SULFATE 200 MG/1
400 TABLET, FILM COATED ORAL DAILY
Qty: 180 TABLET | Refills: 0 | Status: SHIPPED | OUTPATIENT
Start: 2022-10-24

## 2022-10-28 NOTE — TELEPHONE ENCOUNTER
Patient ID: This is a 68 year old female     Chief Complaint   Patient presents with   • Abdominal Pain     Worsening Abdominal pain x1 month, and black stool x3 days     HPI: This is a 68 year old female a pt of Dr. Flor here for evaluation of abdominal pain for the last month.  It is worsening since it started.  It is in the lower abdomen, daily, constant with the intensity changing, scales the pain as a 4/10 but has gone up to a 10.  States if feels bloated she takes furosemide 40 mg maybe once a week she states.  But no LE edema.  Has SOB on exertion that is chronic in nature and improves with rest, not worse it is stable.  Notes nausea with vomiting in the morning that is mucus about 1-2 times a week.  No reflux.  Notes heartburn.  No constipation.  Has diarrhea about 2 times a week where it is loose and watery otherwise it is soft, solid stool.  She started to notice black stools just 3 days ago, no bright red blood.  No iron supplements or pepto bismol, but has been taking kaopectate in the last 3 days.       was with [facility] years ago and was told then she has CHF  I don't see any results under care everywhere or in epic for an echo.      Review of Systems   12 point inspection negative except what is in the HPI       Current Medications    ACETAMINOPHEN (TYLENOL) 500 MG TABLET    Take 1 tablet by mouth every 6 hours as needed for Pain.    ALBUTEROL 108 (90 BASE) MCG/ACT INHALER    Inhale 4 puffs into the lungs every 4 hours as needed for Shortness of Breath or Wheezing.    CALCIUM CARB-CHOLECALCIFEROL (CALCIUM 500 + D) 500-400 MG-UNIT TAB    Take 1 tablet by mouth 2 times daily.    FLUTICASONE-SALMETEROL 250-50 MCG/ACT INHALER    Inhale 1 puff into the lungs in the morning and 1 puff in the evening.    LISINOPRIL (ZESTRIL) 40 MG TABLET    Take 1 tablet by mouth daily.    MELOXICAM (MOBIC) 7.5 MG TABLET    Take 1 tablet by mouth daily.    METOPROLOL SUCCINATE (TOPROL-XL) 50 MG 24 HR TABLET    Take 1  Per pt she states it was not her fault she couldn't come in.  She states that she had [facility] and that's why she couldn't come in to see Dr. She states she switched over to [facility] and when she called to schedule an appt, March 1st was the soonest appt avai tablet by mouth daily.    NIFEDIPINE CC (ADALAT CC) 30 MG 24 HR TABLET    Take 1 tablet by mouth daily.    ROSUVASTATIN (CRESTOR) 10 MG TABLET    Take 1 tablet by mouth daily.       LABS  Lab Results   Component Value Date    SODIUM 140 07/30/2022    POTASSIUM 4.2 07/30/2022    CHLORIDE 108 07/30/2022    CO2 27 07/30/2022    BUN 18 07/30/2022    CREATININE 1.00 (H) 07/30/2022    CALCIUM 9.7 07/30/2022    ALBUMIN 4.1 07/30/2022    BILIRUBIN 0.7 07/30/2022    ALKPT 129 (H) 07/30/2022    GPT 45 07/30/2022    AST 45 (H) 07/30/2022    GLUCOSE 119 (H) 07/30/2022     Lab Results   Component Value Date    CHOLESTEROL 153 07/30/2022    TRIGLYCERIDE 102 07/30/2022    HDL 99 07/30/2022    CALCLDL 34 07/30/2022     Lab Results   Component Value Date    WBC 8.2 01/11/2022    RBC 4.78 01/11/2022    HGB 14.4 01/11/2022    HCT 45.9 01/11/2022    MCV 96.0 01/11/2022    MCH 30.1 01/11/2022    MCHC 31.4 (L) 01/11/2022     01/11/2022    TLYMPH 27 01/11/2022    PMON 9 01/11/2022    PEOS 3 01/11/2022    PBASO 1 01/11/2022    ANEUT 5.0 01/11/2022    ALYMS 2.2 01/11/2022    RENE 0.7 01/11/2022    AEOS 0.2 01/11/2022    ABASO 0.1 01/11/2022    NRBCRE 0 01/11/2022     Lab Results   Component Value Date    HGBA1C 5.5 01/11/2022     Lab Results   Component Value Date    TSH 1.054 01/11/2022     Lab Results   Component Value Date    VITD25 32.6 07/01/2021         ALLERGIES:   Allergen Reactions   • Ketorolac Tromethamine RASH   • Prochlorperazine RASH       Patient Active Problem List   Diagnosis   • Myalgia   • Essential hypertension   • Mild persistent asthma without complication   • Congestive heart failure (CHF) (CMS/HCC)   • DDD (degenerative disc disease), lumbar   • History of borderline diabetes mellitus   • Atherosclerosis of aortic arch (CMS/HCC)   • Mixed hyperlipidemia   • Chronic left-sided low back pain without sciatica        Past Surgical History:   Procedure Laterality Date   • No past surgeries         No family history  on file.    Social History     Socioeconomic History   • Marital status: Single     Spouse name: Not on file   • Number of children: Not on file   • Years of education: Not on file   • Highest education level: Not on file   Occupational History   • Not on file   Tobacco Use   • Smoking status: Never Smoker   • Smokeless tobacco: Never Used   Vaping Use   • Vaping Use: never used   Substance and Sexual Activity   • Alcohol use: Yes     Comment: occasionally   • Drug use: Never   • Sexual activity: Not Currently   Other Topics Concern   • Not on file   Social History Narrative   • Not on file     Social Determinants of Health     Financial Resource Strain: Not on file   Food Insecurity: Not on file   Transportation Needs: Not on file   Physical Activity: Not on file   Stress: Not on file   Social Connections: Not on file   Intimate Partner Violence: Not on file       Immunization History   Administered Date(s) Administered   • COVID-19 18Y+ [employer] 10/09/2021       Physical:  Visit Vitals  BP (!) 149/88 (BP Location: LUE - Left upper extremity, Patient Position: Sitting, Cuff Size: Regular)   Pulse 80   Ht 5' 4\" (1.626 m)   Wt 78.8 kg (173 lb 9.8 oz)   SpO2 98%   BMI 29.80 kg/m²     Physical Exam  Vitals and nursing note reviewed.   Constitutional:       General: She is not in acute distress.     Appearance: She is well-developed.   HENT:      Head: Normocephalic and atraumatic.   Neck:      Thyroid: No thyromegaly.   Cardiovascular:      Rate and Rhythm: Normal rate and regular rhythm.      Heart sounds: Normal heart sounds. No murmur heard.  Pulmonary:      Effort: Pulmonary effort is normal. No respiratory distress.      Breath sounds: Normal breath sounds. No wheezing.   Abdominal:      General: Bowel sounds are normal. There is no distension.      Palpations: Abdomen is soft. There is no mass.      Tenderness: There is abdominal tenderness (lower abdomen). There is no guarding.    Musculoskeletal:         General: No tenderness.   Skin:     Findings: No rash.   Neurological:      Mental Status: She is alert and oriented to person, place, and time.      Cranial Nerves: No cranial nerve deficit.   Psychiatric:         Behavior: Behavior normal.         Problem List Items Addressed This Visit        Cardiac and Vasculature    Essential hypertension    Congestive heart failure (CHF) (CMS/HCC)      Other Visit Diagnoses     Dyspepsia    -  Primary    Relevant Medications    omeprazole 20 MG tablet    Lower abdominal pain        Relevant Orders    XR ABDOMEN 2 VIEWS (Completed)        Reviewed updated med list.  Reviewed previous labs  Dark stools:  Informed pt most likely from the Kaopectate but she just bought back today the occult stool if negative then from medicine if positive she will be referred to GI by her PCP  Dyspepsia:  Patient educated on disease process, etiology & prognosis.  Proper usage and side effects of medications reviewed & discussed.   Agreed to start medication  Abdominal pain:  Xray  CHF hx asymptomatic use of furosemide for reasons not related and very limited, to f/u with PCP about getting an ECHO.  HTN:  BP elevated cpm for now asymptomatic, counseled on DASH Diet  Pt has verbalized understanding and is in agreement  Follow up sooner with any new concerns or complaints  Will reassess based on findings.  Declined all immunizations        Jose Magallanes MD

## 2022-11-02 ENCOUNTER — OFFICE VISIT (OUTPATIENT)
Dept: RHEUMATOLOGY | Facility: CLINIC | Age: 57
End: 2022-11-02
Payer: MEDICAID

## 2022-11-02 ENCOUNTER — TELEPHONE (OUTPATIENT)
Dept: RHEUMATOLOGY | Facility: CLINIC | Age: 57
End: 2022-11-02

## 2022-11-02 VITALS
HEART RATE: 66 BPM | HEIGHT: 62 IN | BODY MASS INDEX: 36.78 KG/M2 | DIASTOLIC BLOOD PRESSURE: 74 MMHG | WEIGHT: 199.88 LBS | RESPIRATION RATE: 16 BRPM | SYSTOLIC BLOOD PRESSURE: 111 MMHG

## 2022-11-02 DIAGNOSIS — M25.561 CHRONIC PAIN OF BOTH KNEES: ICD-10-CM

## 2022-11-02 DIAGNOSIS — M25.50 POLYARTHRALGIA: ICD-10-CM

## 2022-11-02 DIAGNOSIS — G89.29 CHRONIC PAIN OF BOTH KNEES: ICD-10-CM

## 2022-11-02 DIAGNOSIS — M79.10 MYALGIA: ICD-10-CM

## 2022-11-02 DIAGNOSIS — M25.562 CHRONIC PAIN OF BOTH KNEES: ICD-10-CM

## 2022-11-02 DIAGNOSIS — M25.572 CHRONIC PAIN OF LEFT ANKLE: Primary | ICD-10-CM

## 2022-11-02 DIAGNOSIS — G89.29 CHRONIC PAIN OF LEFT ANKLE: Primary | ICD-10-CM

## 2022-11-02 PROCEDURE — 99214 OFFICE O/P EST MOD 30 MIN: CPT | Performed by: INTERNAL MEDICINE

## 2022-11-02 PROCEDURE — 3078F DIAST BP <80 MM HG: CPT | Performed by: INTERNAL MEDICINE

## 2022-11-02 PROCEDURE — 3008F BODY MASS INDEX DOCD: CPT | Performed by: INTERNAL MEDICINE

## 2022-11-02 PROCEDURE — 3074F SYST BP LT 130 MM HG: CPT | Performed by: INTERNAL MEDICINE

## 2022-11-02 RX ORDER — PREDNISONE 1 MG/1
5 TABLET ORAL DAILY
Qty: 90 TABLET | Refills: 0 | Status: SHIPPED | OUTPATIENT
Start: 2022-11-02

## 2022-11-02 NOTE — PATIENT INSTRUCTIONS
You were seen today for joint pain  Possible inflammatory  Restart prednisone 5 mg daily  Stop the plaquenil and see the eye doctor  Plan to get L ankle MRI  See the back doctor and see if they want to do MRI of your back   See me in 2 mos

## 2022-12-08 ENCOUNTER — APPOINTMENT (OUTPATIENT)
Dept: CT IMAGING | Facility: HOSPITAL | Age: 57
End: 2022-12-08
Payer: MEDICAID

## 2022-12-08 ENCOUNTER — HOSPITAL ENCOUNTER (OUTPATIENT)
Facility: HOSPITAL | Age: 57
Setting detail: OBSERVATION
Discharge: HOME | End: 2022-12-09
Attending: EMERGENCY MEDICINE
Payer: MEDICAID

## 2022-12-08 ENCOUNTER — APPOINTMENT (OUTPATIENT)
Dept: CT IMAGING | Facility: HOSPITAL | Age: 57
End: 2022-12-08
Attending: EMERGENCY MEDICINE
Payer: MEDICAID

## 2022-12-08 ENCOUNTER — APPOINTMENT (OUTPATIENT)
Dept: MRI IMAGING | Facility: HOSPITAL | Age: 57
End: 2022-12-08
Attending: EMERGENCY MEDICINE
Payer: MEDICAID

## 2022-12-08 DIAGNOSIS — R20.2 PARESTHESIAS: ICD-10-CM

## 2022-12-08 DIAGNOSIS — R42 DIZZINESS: Primary | ICD-10-CM

## 2022-12-08 DIAGNOSIS — G44.209 TENSION HEADACHE: ICD-10-CM

## 2022-12-08 PROBLEM — R79.89 AZOTEMIA: Status: ACTIVE | Noted: 2022-12-08

## 2022-12-08 LAB
ANION GAP SERPL CALC-SCNC: 6 MMOL/L (ref 0–18)
BASOPHILS # BLD AUTO: 0.03 X10(3) UL (ref 0–0.2)
BASOPHILS NFR BLD AUTO: 0.4 %
BUN BLD-MCNC: 19 MG/DL (ref 7–18)
BUN/CREAT SERPL: 20.9 (ref 10–20)
CALCIUM BLD-MCNC: 8.5 MG/DL (ref 8.5–10.1)
CHLORIDE SERPL-SCNC: 105 MMOL/L (ref 98–112)
CO2 SERPL-SCNC: 27 MMOL/L (ref 21–32)
CREAT BLD-MCNC: 0.91 MG/DL
DEPRECATED RDW RBC AUTO: 41.9 FL (ref 35.1–46.3)
EOSINOPHIL # BLD AUTO: 0.15 X10(3) UL (ref 0–0.7)
EOSINOPHIL NFR BLD AUTO: 2 %
ERYTHROCYTE [DISTWIDTH] IN BLOOD BY AUTOMATED COUNT: 13.9 % (ref 11–15)
GFR SERPLBLD BASED ON 1.73 SQ M-ARVRAT: 75 ML/MIN/1.73M2 (ref 60–?)
GLUCOSE BLD-MCNC: 113 MG/DL (ref 70–99)
GLUCOSE BLDC GLUCOMTR-MCNC: 123 MG/DL (ref 70–99)
HCT VFR BLD AUTO: 40.6 %
HGB BLD-MCNC: 13.2 G/DL
IMM GRANULOCYTES # BLD AUTO: 0.03 X10(3) UL (ref 0–1)
IMM GRANULOCYTES NFR BLD: 0.4 %
LYMPHOCYTES # BLD AUTO: 1.43 X10(3) UL (ref 1–4)
LYMPHOCYTES NFR BLD AUTO: 19 %
MAGNESIUM SERPL-MCNC: 2.3 MG/DL (ref 1.6–2.6)
MCH RBC QN AUTO: 26.9 PG (ref 26–34)
MCHC RBC AUTO-ENTMCNC: 32.5 G/DL (ref 31–37)
MCV RBC AUTO: 82.7 FL
MONOCYTES # BLD AUTO: 0.49 X10(3) UL (ref 0.1–1)
MONOCYTES NFR BLD AUTO: 6.5 %
NEUTROPHILS # BLD AUTO: 5.39 X10 (3) UL (ref 1.5–7.7)
NEUTROPHILS # BLD AUTO: 5.39 X10(3) UL (ref 1.5–7.7)
NEUTROPHILS NFR BLD AUTO: 71.7 %
OSMOLALITY SERPL CALC.SUM OF ELEC: 289 MOSM/KG (ref 275–295)
PLATELET # BLD AUTO: 212 10(3)UL (ref 150–450)
POTASSIUM SERPL-SCNC: 3.7 MMOL/L (ref 3.5–5.1)
RBC # BLD AUTO: 4.91 X10(6)UL
SODIUM SERPL-SCNC: 138 MMOL/L (ref 136–145)
T3FREE SERPL-MCNC: 2.03 PG/ML (ref 2.4–4.2)
T4 FREE SERPL-MCNC: 1.3 NG/DL (ref 0.8–1.7)
TSI SER-ACNC: 0.3 MIU/ML (ref 0.36–3.74)
WBC # BLD AUTO: 7.5 X10(3) UL (ref 4–11)

## 2022-12-08 PROCEDURE — 70551 MRI BRAIN STEM W/O DYE: CPT | Performed by: EMERGENCY MEDICINE

## 2022-12-08 PROCEDURE — 70496 CT ANGIOGRAPHY HEAD: CPT | Performed by: EMERGENCY MEDICINE

## 2022-12-08 PROCEDURE — 70450 CT HEAD/BRAIN W/O DYE: CPT | Performed by: EMERGENCY MEDICINE

## 2022-12-08 PROCEDURE — 70498 CT ANGIOGRAPHY NECK: CPT | Performed by: EMERGENCY MEDICINE

## 2022-12-08 RX ORDER — LORAZEPAM 2 MG/ML
0.5 INJECTION INTRAMUSCULAR ONCE
Status: COMPLETED | OUTPATIENT
Start: 2022-12-08 | End: 2022-12-08

## 2022-12-08 RX ORDER — ONDANSETRON 2 MG/ML
4 INJECTION INTRAMUSCULAR; INTRAVENOUS ONCE
Status: COMPLETED | OUTPATIENT
Start: 2022-12-08 | End: 2022-12-08

## 2022-12-08 RX ORDER — MECLIZINE HYDROCHLORIDE 25 MG/1
25 TABLET ORAL ONCE
Status: COMPLETED | OUTPATIENT
Start: 2022-12-08 | End: 2022-12-08

## 2022-12-08 RX ORDER — KETOROLAC TROMETHAMINE 15 MG/ML
15 INJECTION, SOLUTION INTRAMUSCULAR; INTRAVENOUS ONCE
Status: COMPLETED | OUTPATIENT
Start: 2022-12-08 | End: 2022-12-08

## 2022-12-09 ENCOUNTER — APPOINTMENT (OUTPATIENT)
Dept: CV DIAGNOSTICS | Facility: HOSPITAL | Age: 57
End: 2022-12-09
Attending: Other
Payer: MEDICAID

## 2022-12-09 VITALS
SYSTOLIC BLOOD PRESSURE: 127 MMHG | HEART RATE: 62 BPM | TEMPERATURE: 98 F | BODY MASS INDEX: 36 KG/M2 | RESPIRATION RATE: 18 BRPM | OXYGEN SATURATION: 96 % | WEIGHT: 198.44 LBS | DIASTOLIC BLOOD PRESSURE: 68 MMHG

## 2022-12-09 PROBLEM — R20.2 PARESTHESIAS: Status: ACTIVE | Noted: 2022-12-09

## 2022-12-09 PROBLEM — G44.209 TENSION HEADACHE: Status: ACTIVE | Noted: 2022-12-09

## 2022-12-09 LAB
ANION GAP SERPL CALC-SCNC: 4 MMOL/L (ref 0–18)
ATRIAL RATE: 63 BPM
BASOPHILS # BLD AUTO: 0.02 X10(3) UL (ref 0–0.2)
BASOPHILS NFR BLD AUTO: 0.3 %
BUN BLD-MCNC: 15 MG/DL (ref 7–18)
BUN/CREAT SERPL: 19.2 (ref 10–20)
CALCIUM BLD-MCNC: 8.1 MG/DL (ref 8.5–10.1)
CHLORIDE SERPL-SCNC: 107 MMOL/L (ref 98–112)
CHOLEST SERPL-MCNC: 161 MG/DL (ref ?–200)
CO2 SERPL-SCNC: 29 MMOL/L (ref 21–32)
CREAT BLD-MCNC: 0.78 MG/DL
DEPRECATED RDW RBC AUTO: 42.7 FL (ref 35.1–46.3)
EOSINOPHIL # BLD AUTO: 0.1 X10(3) UL (ref 0–0.7)
EOSINOPHIL NFR BLD AUTO: 1.4 %
ERYTHROCYTE [DISTWIDTH] IN BLOOD BY AUTOMATED COUNT: 14.1 % (ref 11–15)
EST. AVERAGE GLUCOSE BLD GHB EST-MCNC: 154 MG/DL (ref 68–126)
GFR SERPLBLD BASED ON 1.73 SQ M-ARVRAT: 90 ML/MIN/1.73M2 (ref 60–?)
GLUCOSE BLD-MCNC: 108 MG/DL (ref 70–99)
GLUCOSE BLDC GLUCOMTR-MCNC: 104 MG/DL (ref 70–99)
GLUCOSE BLDC GLUCOMTR-MCNC: 104 MG/DL (ref 70–99)
GLUCOSE BLDC GLUCOMTR-MCNC: 107 MG/DL (ref 70–99)
HBA1C MFR BLD: 7 % (ref ?–5.7)
HCT VFR BLD AUTO: 38.8 %
HDLC SERPL-MCNC: 57 MG/DL (ref 40–59)
HGB BLD-MCNC: 12.3 G/DL
IMM GRANULOCYTES # BLD AUTO: 0.02 X10(3) UL (ref 0–1)
IMM GRANULOCYTES NFR BLD: 0.3 %
LDLC SERPL CALC-MCNC: 89 MG/DL (ref ?–100)
LYMPHOCYTES # BLD AUTO: 1.78 X10(3) UL (ref 1–4)
LYMPHOCYTES NFR BLD AUTO: 25.2 %
MCH RBC QN AUTO: 26.3 PG (ref 26–34)
MCHC RBC AUTO-ENTMCNC: 31.7 G/DL (ref 31–37)
MCV RBC AUTO: 82.9 FL
MONOCYTES # BLD AUTO: 0.52 X10(3) UL (ref 0.1–1)
MONOCYTES NFR BLD AUTO: 7.4 %
NEUTROPHILS # BLD AUTO: 4.61 X10 (3) UL (ref 1.5–7.7)
NEUTROPHILS # BLD AUTO: 4.61 X10(3) UL (ref 1.5–7.7)
NEUTROPHILS NFR BLD AUTO: 65.4 %
NONHDLC SERPL-MCNC: 104 MG/DL (ref ?–130)
OSMOLALITY SERPL CALC.SUM OF ELEC: 291 MOSM/KG (ref 275–295)
P AXIS: 33 DEGREES
P-R INTERVAL: 162 MS
PLATELET # BLD AUTO: 194 10(3)UL (ref 150–450)
POTASSIUM SERPL-SCNC: 3.9 MMOL/L (ref 3.5–5.1)
Q-T INTERVAL: 464 MS
QRS DURATION: 140 MS
QTC CALCULATION (BEZET): 474 MS
R AXIS: 52 DEGREES
RBC # BLD AUTO: 4.68 X10(6)UL
SARS-COV-2 RNA RESP QL NAA+PROBE: NOT DETECTED
SODIUM SERPL-SCNC: 140 MMOL/L (ref 136–145)
T AXIS: 7 DEGREES
TRIGL SERPL-MCNC: 79 MG/DL (ref 30–149)
VENTRICULAR RATE: 63 BPM
VLDLC SERPL CALC-MCNC: 13 MG/DL (ref 0–30)
WBC # BLD AUTO: 7.1 X10(3) UL (ref 4–11)

## 2022-12-09 PROCEDURE — 99220 INITIAL OBSERVATION CARE,LEVL III: CPT | Performed by: OTHER

## 2022-12-09 PROCEDURE — 93306 TTE W/DOPPLER COMPLETE: CPT | Performed by: OTHER

## 2022-12-09 RX ORDER — ACETAMINOPHEN 650 MG/1
650 SUPPOSITORY RECTAL EVERY 4 HOURS PRN
Status: DISCONTINUED | OUTPATIENT
Start: 2022-12-09 | End: 2022-12-09

## 2022-12-09 RX ORDER — PANTOPRAZOLE SODIUM 40 MG/1
40 TABLET, DELAYED RELEASE ORAL
Status: DISCONTINUED | OUTPATIENT
Start: 2022-12-09 | End: 2022-12-09

## 2022-12-09 RX ORDER — HYDROXYCHLOROQUINE SULFATE 200 MG/1
400 TABLET, FILM COATED ORAL DAILY
Status: DISCONTINUED | OUTPATIENT
Start: 2022-12-09 | End: 2022-12-09

## 2022-12-09 RX ORDER — ACETAMINOPHEN 325 MG/1
650 TABLET ORAL EVERY 4 HOURS PRN
Status: DISCONTINUED | OUTPATIENT
Start: 2022-12-09 | End: 2022-12-09

## 2022-12-09 RX ORDER — NICOTINE POLACRILEX 4 MG
15 LOZENGE BUCCAL
Status: DISCONTINUED | OUTPATIENT
Start: 2022-12-09 | End: 2022-12-09

## 2022-12-09 RX ORDER — ASPIRIN 325 MG
325 TABLET ORAL DAILY
Status: DISCONTINUED | OUTPATIENT
Start: 2022-12-09 | End: 2022-12-09

## 2022-12-09 RX ORDER — BUTALBITAL, ACETAMINOPHEN AND CAFFEINE 50; 325; 40 MG/1; MG/1; MG/1
1 TABLET ORAL EVERY 4 HOURS PRN
Status: DISCONTINUED | OUTPATIENT
Start: 2022-12-09 | End: 2022-12-09

## 2022-12-09 RX ORDER — HYDROCODONE BITARTRATE AND ACETAMINOPHEN 5; 325 MG/1; MG/1
1 TABLET ORAL 2 TIMES DAILY PRN
Status: DISCONTINUED | OUTPATIENT
Start: 2022-12-09 | End: 2022-12-09

## 2022-12-09 RX ORDER — NICOTINE POLACRILEX 4 MG
30 LOZENGE BUCCAL
Status: DISCONTINUED | OUTPATIENT
Start: 2022-12-09 | End: 2022-12-09

## 2022-12-09 RX ORDER — ONDANSETRON 2 MG/ML
4 INJECTION INTRAMUSCULAR; INTRAVENOUS EVERY 6 HOURS PRN
Status: DISCONTINUED | OUTPATIENT
Start: 2022-12-09 | End: 2022-12-09

## 2022-12-09 RX ORDER — VALSARTAN 80 MG/1
80 TABLET ORAL DAILY
Qty: 90 TABLET | Refills: 1 | Status: SHIPPED | COMMUNITY
Start: 2022-12-11

## 2022-12-09 RX ORDER — ASPIRIN 300 MG/1
300 SUPPOSITORY RECTAL DAILY
Status: DISCONTINUED | OUTPATIENT
Start: 2022-12-09 | End: 2022-12-09

## 2022-12-09 RX ORDER — METOPROLOL SUCCINATE 25 MG/1
TABLET, EXTENDED RELEASE ORAL
Qty: 90 TABLET | Refills: 1 | Status: SHIPPED | COMMUNITY
Start: 2022-12-11

## 2022-12-09 RX ORDER — LABETALOL HYDROCHLORIDE 5 MG/ML
10 INJECTION, SOLUTION INTRAVENOUS EVERY 10 MIN PRN
Status: DISCONTINUED | OUTPATIENT
Start: 2022-12-09 | End: 2022-12-09

## 2022-12-09 RX ORDER — ATORVASTATIN CALCIUM 40 MG/1
40 TABLET, FILM COATED ORAL NIGHTLY
Status: DISCONTINUED | OUTPATIENT
Start: 2022-12-09 | End: 2022-12-09

## 2022-12-09 RX ORDER — HYDRALAZINE HYDROCHLORIDE 20 MG/ML
10 INJECTION INTRAMUSCULAR; INTRAVENOUS EVERY 2 HOUR PRN
Status: DISCONTINUED | OUTPATIENT
Start: 2022-12-09 | End: 2022-12-09

## 2022-12-09 RX ORDER — POTASSIUM CHLORIDE 20 MEQ/1
40 TABLET, EXTENDED RELEASE ORAL ONCE
Status: COMPLETED | OUTPATIENT
Start: 2022-12-09 | End: 2022-12-09

## 2022-12-09 RX ORDER — ENOXAPARIN SODIUM 100 MG/ML
40 INJECTION SUBCUTANEOUS EVERY 24 HOURS
Status: DISCONTINUED | OUTPATIENT
Start: 2022-12-09 | End: 2022-12-09

## 2022-12-09 RX ORDER — DEXTROSE MONOHYDRATE 25 G/50ML
50 INJECTION, SOLUTION INTRAVENOUS
Status: DISCONTINUED | OUTPATIENT
Start: 2022-12-09 | End: 2022-12-09

## 2022-12-09 RX ORDER — MIDODRINE HYDROCHLORIDE 5 MG/1
5 TABLET ORAL 3 TIMES DAILY
Qty: 6 TABLET | Refills: 0 | Status: SHIPPED | OUTPATIENT
Start: 2022-12-09 | End: 2022-12-11

## 2022-12-09 RX ORDER — ACETAMINOPHEN 325 MG/1
650 TABLET ORAL EVERY 6 HOURS PRN
Status: DISCONTINUED | OUTPATIENT
Start: 2022-12-09 | End: 2022-12-09

## 2022-12-09 RX ORDER — SODIUM CHLORIDE 9 MG/ML
INJECTION, SOLUTION INTRAVENOUS CONTINUOUS
Status: DISCONTINUED | OUTPATIENT
Start: 2022-12-09 | End: 2022-12-09

## 2022-12-09 NOTE — DISCHARGE INSTRUCTIONS
Please follow-up with your primary care doctor as well as your rheumatologist for further work-up. You had extensive work-up here in the hospital all of which was negative   We have given you prescription for vestibular physical therapy. Please call and make an appointment. Your symptoms could possibly be due to complex migraine versus adrenal insufficiency? (Given the setting of steroid use). Per cardiology, hold your blood pressure medication for 1-2 days and take midodrine instead to see if your symptoms improve.

## 2022-12-09 NOTE — ED QUICK NOTES
Orders for admission, patient is aware of plan and ready to go upstairs.  Any questions, please call ED RN steve at extension [phone]     Patient Covid vaccination status: Partially vaccinated     COVID Test Ordered in ED: Rapid SARS-CoV-2 by PCR    COVID Suspicion at Admission: N/A    Running Infusions:      Mental Status/LOC at time of transport: alert    Other pertinent information:   CIWA score: N/A   NIH score:  0

## 2022-12-09 NOTE — PLAN OF CARE
Patient had dizzness this evening, had ct scan which was negitive, melizine and fluids given in ed. Patient is comfortable daughter is at bedside,    Problem: SAFETY ADULT - FALL  Goal: Free from fall injury  Description: INTERVENTIONS:  - Assess pt frequently for physical needs  - Identify cognitive and physical deficits and behaviors that affect risk of falls. - [place] fall precautions as indicated by assessment.  - Educate pt/family on patient safety including physical limitations  - Instruct pt to call for assistance with activity based on assessment  - Modify environment to reduce risk of injury  - Provide assistive devices as appropriate  - Consider OT/PT consult to assist with strengthening/mobility  - Encourage toileting schedule  Outcome: Progressing     Problem: NEUROLOGICAL - ADULT  Goal: Achieves stable or improved neurological status  Description: INTERVENTIONS  - Assess for and report changes in neurological status  - Initiate measures to prevent increased intracranial pressure  - Maintain blood pressure and fluid volume within ordered parameters to optimize cerebral perfusion and minimize risk of hemorrhage  - Monitor temperature, glucose, and sodium.  Initiate appropriate interventions as ordered  Outcome: Progressing  Goal: Absence of seizures  Description: INTERVENTIONS  - Monitor for seizure activity  - Administer anti-seizure medications as ordered  - Monitor neurological status  Outcome: Progressing  Goal: Remains free of injury related to seizure activity  Description: INTERVENTIONS:  - Maintain airway, patient safety  and administer oxygen as ordered  - Monitor patient for seizure activity, document and report duration and description of seizure to MD/LIP  - If seizure occurs, turn patient to side and suction secretions as needed  - Reorient patient post seizure  - Seizure pads on all 4 side rails  - Instruct patient/family to notify RN of any seizure activity  - Instruct patient/family to call for assistance with activity based on assessment  Outcome: Progressing  Goal: Achieves maximal functionality and self care  Description: INTERVENTIONS  - Monitor swallowing and airway patency with patient fatigue and changes in neurological status  - Encourage and assist patient to increase activity and self care with guidance from PT/OT  - Encourage visually impaired, hearing impaired and aphasic patients to use assistive/communication devices  Outcome: Progressing

## 2022-12-09 NOTE — ED INITIAL ASSESSMENT (HPI)
Pt received in wheelchair with c/o left-sided facial numbness/confusion since yesterday at noon. +left-sided facial droop.

## 2022-12-09 NOTE — PLAN OF CARE
Rec'd order for discharge, saline lock and telemetry monitor removed. Patient and daughter given discharge instructions including when to take next doses, information for MD follow up appointments and pt to  new prescription at her pharmacy. Instructed pt to keep log of blood pressure readings over the next few days and take log to her MD appointments. All questions answered. Patient discharged in good condition accompanied by her daughter. Problem: Patient Centered Care  Goal: Patient preferences are identified and integrated in the patient's plan of care  Description: Interventions:  - What would you like us to know as we care for you?   - Provide timely, complete, and accurate information to patient/family  - Incorporate patient and family knowledge, values, beliefs, and cultural backgrounds into the planning and delivery of care  - Encourage patient/family to participate in care and decision-making at the level they choose  - Honor patient and family perspectives and choices  Outcome: Adequate for Discharge     Problem: Patient/Family Goals  Goal: Patient/Family Long Term Goal  Description: Patient's Long Term Goal:     Interventions:  -   - See additional Care Plan goals for specific interventions  Outcome: Adequate for Discharge  Goal: Patient/Family Short Term Goal  Description: Patient's Short Term Goal:     Interventions:   -   - See additional Care Plan goals for specific interventions  Outcome: Adequate for Discharge     Problem: SAFETY ADULT - FALL  Goal: Free from fall injury  Description: INTERVENTIONS:  - Assess pt frequently for physical needs  - Identify cognitive and physical deficits and behaviors that affect risk of falls.   - [place] fall precautions as indicated by assessment.  - Educate pt/family on patient safety including physical limitations  - Instruct pt to call for assistance with activity based on assessment  - Modify environment to reduce risk of injury  - Provide assistive devices as appropriate  - Consider OT/PT consult to assist with strengthening/mobility  - Encourage toileting schedule  Outcome: Adequate for Discharge     Problem: NEUROLOGICAL - ADULT  Goal: Achieves stable or improved neurological status  Description: INTERVENTIONS  - Assess for and report changes in neurological status  - Initiate measures to prevent increased intracranial pressure  - Maintain blood pressure and fluid volume within ordered parameters to optimize cerebral perfusion and minimize risk of hemorrhage  - Monitor temperature, glucose, and sodium.  Initiate appropriate interventions as ordered  Outcome: Adequate for Discharge  Goal: Absence of seizures  Description: INTERVENTIONS  - Monitor for seizure activity  - Administer anti-seizure medications as ordered  - Monitor neurological status  Outcome: Adequate for Discharge  Goal: Remains free of injury related to seizure activity  Description: INTERVENTIONS:  - Maintain airway, patient safety  and administer oxygen as ordered  - Monitor patient for seizure activity, document and report duration and description of seizure to MD/LIP  - If seizure occurs, turn patient to side and suction secretions as needed  - Reorient patient post seizure  - Seizure pads on all 4 side rails  - Instruct patient/family to notify RN of any seizure activity  - Instruct patient/family to call for assistance with activity based on assessment  Outcome: Adequate for Discharge  Goal: Achieves maximal functionality and self care  Description: INTERVENTIONS  - Monitor swallowing and airway patency with patient fatigue and changes in neurological status  - Encourage and assist patient to increase activity and self care with guidance from PT/OT  - Encourage visually impaired, hearing impaired and aphasic patients to use assistive/communication devices  Outcome: Adequate for Discharge

## 2022-12-12 ENCOUNTER — PATIENT OUTREACH (OUTPATIENT)
Dept: CASE MANAGEMENT | Age: 57
End: 2022-12-12

## 2022-12-12 NOTE — PROGRESS NOTES
TCM chart review. No TCM as patient follows outside [facility] PCP. Encounter closing.

## 2022-12-29 ENCOUNTER — ORDER TRANSCRIPTION (OUTPATIENT)
Dept: ADMINISTRATIVE | Facility: HOSPITAL | Age: 57
End: 2022-12-29

## 2022-12-29 DIAGNOSIS — Z12.31 ENCOUNTER FOR SCREENING MAMMOGRAM FOR MALIGNANT NEOPLASM OF BREAST: Primary | ICD-10-CM

## 2023-01-03 NOTE — TELEPHONE ENCOUNTER
The health plan has denied request for MRI Left Ankle. Listed below is the denial rationale. You may reach out to [facility] for a peer review at [phone], reference case # [id number]. Please reach out to the patient with plan of care.      Thank you,   Freddy

## 2023-01-03 NOTE — TELEPHONE ENCOUNTER
MIGUEL Ocasio Loveless, patient's ankle MRI was denied because she didn't have the x-ray done yet. Spoke with patient and she is aware of MRI denial. She will try to have x-ray done tomorrow and then will need to resubmit MRI authorization. She will cancel MRI for now.

## 2023-02-03 NOTE — PATIENT INSTRUCTIONS
You were seen for the joint pain, still unclear  We injected your right knee with cortisone  Continue the prednisone 5 mg daily for now  Try to see the eye doctors at [facility] eye clinic  Will still try to get the MRI of the left ankle approved

## 2023-02-03 NOTE — PROCEDURES
With paitent's consent, I injected pt's Right knee with 1ml lidocaine 1 % and 1 ml kenalog 40. It was done under sterile technique using iodine and alcohol swabs and ethyl chloride was used as an anaesthetic spray. Pt.  tolerated it well.

## 2023-02-06 NOTE — TELEPHONE ENCOUNTER
I saw patient on Friday 2/3/2023, still having a lot of pain in L ankle, are we able to appeal the denial

## 2023-04-19 NOTE — TELEPHONE ENCOUNTER
Pt contacted and advise MRI of ankle approved. She was given phone number to schedule exam and completed by 6/1/2023.

## 2023-06-09 NOTE — PROGRESS NOTES
Home Monitoring Condition Update    Covid19+ test date: 5/28/2020      Consent Verification:  Assessment Completed With: Patient  HIPAA Verified?   Yes    COVID-19 HOME MONITORING 6/10/2020   Temperature -   Reading From -   SPO2 -   Pulse -   Pulse taken sick.  Pt also states during her last video visit with pcp that the pcp wanted her to try cymbalta for her pain- but nothing has been sent to the pharmacy- she had wanted to start it while she is at home so that she could evaluate the side effects and the call 911.       Time spent this encounter reviewing chart, speaking with patient, gathering resources  Total Time: 50  minutes [FreeTextEntry1] : Assess disease progression, new fistula symptoms.  Leukocytosis

## 2023-06-12 NOTE — TELEPHONE ENCOUNTER
Hydroxychloroquine 200mg, please review and sign off if appropriate. Thank you. Last seen: 2/3/23  Last refill: 1/15/23 #180 with 0 refills.

## 2023-09-11 NOTE — TELEPHONE ENCOUNTER
LOV: 2/3/23  Last Refilled:#180, 0rfs 6/12/23    No future appointments. ASSESSMENT/PLAN:      Possible PMR symptoms appear more like Fibromyalgia  - Symptoms started after having Covid back in 2020 and again worsened after having the vaccine in September 2021  - She has a lot of joint pain involving her hands, wrists, shoulders and knees. Also myalgias throughout her body  - In the past she had a negative KEILA that was positive. Regarding connective tissue diseases she does not exhibit all the symptoms  - Further blood work showed a negative FEDE panel, normal RF and CCP. CRP was slightly elevated at 0.83.    - Had s/e with HCQ  - Now on prednisone 5 mg daily, her symptoms of joint pain and muscle pain have improved, will continue for now   - Still unclear etiology  - Also on Cymbalta 20 mg daily     R knee pain and swelling  - X-ray of the right knee was ordered, right knee injected with 1 cc of lidocaine and 40 mg of Kenalog in sterile fashion. Patient tolerated well     Bilateral ankle pain, left worse than right  - MRI L ankle denied, will try to see if we can do a peer to peer      Chronic back pain  - has seen physiatry, she wants to see neurosurgeon. She will talk to her PCP     Positive KEILA  - It was negative in the past and now positive. Neg FEDE panel     L trochanteric bursitis- improved  - L trochanteric bursa injected in November, noticed some improvement     Hx of Fibromyalgia  - Currently on tizanidine     Pt will f/u in 2 mos      Janell Moncada MD  2/3/2023  3:20 PM           Please advise.

## 2023-12-12 NOTE — TELEPHONE ENCOUNTER
Current Outpatient Medications   Medication Sig Dispense Refill    hydroxychloroquine 200 MG Oral Tab Take 2 tablets (400 mg total) by mouth daily.  Start 1 pill daily for 2 weeks then increase to 2 pill daily 180 tablet 0

## 2023-12-18 NOTE — ED INITIAL ASSESSMENT (HPI)
Pt presents to the ED with c/o a syncopal episode around 2pm, pt was found on the floor by her bed. +dizziness +nausea + left flank pain. Pt's  is covid positive.

## 2024-01-26 NOTE — PROGRESS NOTES
[facility]  Progress Note    Jewels Almanzar is a 58 year old female.   Chief Complaint   Patient presents with    Foot Pain     Left - onset 12/17/19 when she fainted and injured her foot - has x-rays in the system - rates pain as 5-10/10 on and off          HPI:     This is a pleasant type 2 diabetic female with PMR, fibromyalgia, polyarthralgia.  She is on hydroxychloroquine, gabapentin, norco 5 (1-2 pills/day).     She presents to clinic today due to left foot/ankle pain which started a few years ago but in December of 2023 she passed out and injured her left foot.  She did have xrays and MRI.  She states the pain is still present.        Allergies: Morphine, Prochlorperazine, Prochlorperazine edisylate, and Sumatriptan   Current Outpatient Medications   Medication Sig Dispense Refill    hydroxychloroquine 200 MG Oral Tab Take 2 tablets (400 mg total) by mouth daily. Start 1 pill daily for 2 weeks then increase to 2 pill daily 180 tablet 0    DULoxetine 20 MG Oral Cap DR Particles       predniSONE 5 MG Oral Tab Take 1 tablet (5 mg total) by mouth daily. 90 tablet 0    metoprolol succinate ER 25 MG Oral Tablet 24 Hr TAKE 1 TABLET BY MOUTH DAILY AT BEDTIME 90 tablet 1    valsartan 80 MG Oral Tab Take 1 tablet (80 mg total) by mouth daily. 90 tablet 1    DICYCLOMINE 10 MG Oral Cap TAKE 1 CAPSULE BY MOUTH THREE TIMES DAILY AS NEEDED 30 capsule 0    Glucose Blood (ONETOUCH ULTRA) In Vitro Strip 1 each by Other route daily. 100 strip 1    butalbital-acetaminophen-caffeine -40 MG Oral Tab TAKE 1 TABLET BY MOUTH EVERY 6 HOURS AS NEEDED FOR PAIN 30 tablet 1    gabapentin 300 MG Oral Cap 1 capsule po q hs 30 capsule 5    empagliflozin (JARDIANCE) 10 MG Oral Tab Take 1 tablet (10 mg total) by mouth daily. 90 tablet 1    JANUVIA 100 MG Oral Tab TAKE 1 TABLET(100 MG) BY MOUTH DAILY 90 tablet 1    LEVOTHYROXINE 125 MCG Oral Tab TAKE 1 TABLET(125 MCG) BY MOUTH BEFORE BREAKFAST 90 tablet 1     HYDROcodone-acetaminophen 5-325 MG Oral Tab Take 1 tablet by mouth 2 (two) times daily as needed for Pain. 45 tablet 0    clonazePAM 0.5 MG Oral Tab Take 1 tablet (0.5 mg total) by mouth nightly as needed (insomnia). 30 tablet 3    omeprazole 20 MG Oral Capsule Delayed Release Take 1 capsule (20 mg total) by mouth every morning. 90 capsule 1    ERGOCALCIFEROL 1.25 MG (59907 UT) Oral Cap TAKE 1 CAPSULE BY MOUTH 1 TIME A WEEK 12 capsule 0    Lancets (ONETOUCH DELICA PLUS [id number]) Does not apply Misc 1 lancet by Finger stick route daily. 100 each 3    Clobetasol Propionate 0.05 % External Ointment       Insulin Pen Needle (PEN NEEDLES) 32G X 4 MM Does not apply Misc 1 each by Does not apply route every 7 days. (Patient not taking: No sig reported) 30 each 2    lidocaine-menthol 4-1 % External Patch Place 1 patch onto the skin daily as needed. 7 patch 0    Fluticasone Propionate 50 MCG/ACT Nasal Suspension 1 PUFF EACH NOSTRIL 2 TIMES A  DAY 1 Bottle 3    Cranberry 250 MG Oral Tab Take 2 tablets by mouth daily as needed.        Past Medical History:   Diagnosis Date    Allergic rhinitis 2010    Anxiety state, unspecified     Bowel habit changes     colonoscopy 2010    Cholelithiasis     cholecystectomy 2009    COVID-19     Diabetes (HCC)     Disorder of thyroid     Endometriosis 1996    Endometriosis/bleeding, Total Hysterectomy    Esophageal reflux 1998    Fibromyalgia     High blood pressure     Hyperthyroidism 1994    Hypothyroidism 1995    Lipid screening 03/12/2012    per NG    Obesity, unspecified 1990    TMJ (temporomandibular joint disorder) 2010    \"TMJ\"    Tonsillitis     tonsillectomy 1979    Unspecified sleep apnea 2007    no cpap use      Past Surgical History:   Procedure Laterality Date    APPENDECTOMY  1996    APPENDECTOMY  1994    CHOLECYSTECTOMY  2009    COLONOSCOPY  12/8/2010    per NG    COLONOSCOPY  12/2014    COLONOSCOPY N/A 6/28/2021    Procedure: COLONOSCOPY;  Surgeon: Cyril Betts MD;   Location: [facility] ENDOSCOPY    HYSTERECTOMY  1996    total    HYSTERECTOMY      NEEDLE BIOPSY LEFT      Benign      1992,93    OTHER SURGICAL HISTORY      endiometrosis    TONSILLECTOMY      TONSILLECTOMY      TOTAL ABDOM HYSTERECTOMY        Family History   Problem Relation Age of Onset    Heart Attack Father 75        MI    Diabetes Father     Hypertension Father     Lipids Father     Other (Other) Father         Rheumatoid Arthritis    Heart Attack Mother         MI    Heart Disorder Mother     Hypertension Mother     Heart Attack Other         MI AT 45/MI AT 48 AFTER CHEMO, SISTERS X 2     Breast Cancer Sister 38    Glaucoma Sister         patient not sure but thinks she has glc    Cancer Brother 49        KIDNEY    Cancer Brother     Cancer Sister     Cancer Sister     Cancer Maternal Grandmother     Ovarian Cancer Maternal Grandmother 60    Diabetes Sister     Diabetes Sister     Obesity Sister     Obesity Sister     Obesity Sister       Social History     Socioeconomic History    Marital status:    Tobacco Use    Smoking status: Never    Smokeless tobacco: Never   Vaping Use    Vaping Use: Never used   Substance and Sexual Activity    Alcohol use: Never     Alcohol/week: 0.0 standard drinks of alcohol    Drug use: No   Other Topics Concern    Caffeine Concern Yes     Comment: 12 oz coffee per day, and 1 can diet Cola per day.    Exercise No           REVIEW OF SYSTEMS:   Denies nausea, fever, chills  No calf pain  No other muscle or joint aches  Denies chest pain or shortness of breath.      EXAM:   LMP  (LMP Unknown)     Constitutional:   Patient in no apparent distress. Well kept. Of normal body habitus. Alert and oriented to person, place, and time.  Vascular Examination:  DP pulse is 2/4  PT pulse is diminished due to edema  Capillary refill is immediate    Edema is present left ankle    Integumentary Examination:   Digital hair growth is present left and is present right.  Skin is  of good turgor  Neurological Examination:  Monofilament (10-g) sensation is 5/5 to right and 5/5 to left.  Sharp/dull is present to right and is present to left.    Parasthesias present left foot  Musculoskeletal Examination:  Left pedal muscle strength slightly diminished due to pain and guarding  Diffuse POP to left dorsal foot and ankle  Moderate POP along the left ATFL  Pain and hypermobility with left ankle stress inversion      LABS & IMAGING:     Lab Results   Component Value Date     (H) 12/18/2023    BUN 12 12/18/2023    CREATSERUM 0.88 12/18/2023    BUNCREA 13.6 12/18/2023    ANIONGAP 7 12/18/2023    GFRAA 93 05/18/2022    GFRNAA 81 05/18/2022    CA 9.5 12/18/2023     (L) 12/18/2023    K 4.0 12/18/2023    CL 99 12/18/2023    CO2 27.0 12/18/2023    OSMOCALC 277 12/18/2023        Lab Results   Component Value Date     (H) 12/09/2022    A1C 7.0 (H) 12/09/2022        No results found.     ASSESSMENT AND PLAN:   Diagnoses and all orders for this visit:    Partial tear of ligament of lateral aspect of ankle    Pain in left ankle and joints of left foot    Left ankle instability    Arthritis of left foot        Plan:     Described in detail the anatomy of the lateral ankle ligaments and how an inversion type ankle sprain can place significant stress on these ligaments  Patient was given instruction to stay off the ankle as much as possible  Advised the use of compression stockings or ACE wraps to help decrease swelling/edema.  Discussed the importance of immobilization.  Discussed conservative management   Discussed surgical management and indications for both.  Will move forward with conservative management.  Recommend cryotherapy/icing, rest, and elevation.        MRI Left ankle: 11/12/23  No synovitis or osseous erosions to suggest inflammatory arthritis.   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   Mild peroneal tenosynovitis.   Mild Achilles paratenonitis.        Xray Left foot NWB: 12/18/23  There is a small enthesophyte at the insertion of the Achilles tendon. There is a plantar calcaneal enthesophyte. Degenerative changes seen throughout the talonavicular, navicular-cuneiform and tarsometatarsal joints.         Discussed with pt that her left foot/ankle pain could be due to her chronic partial thickness tear of the ATFL and diffuse midfoot arthritis.      Also discussed with pt that her pain could be due to her PMR, fibromyalgia and possible underlying diabetic neuropathy.      Prescribed ASO ankle brace left, pt to wear when ambulating with supportive tennis shoes.      RTC 6 weeks for re evaluation.  May consider PT as well.  She does have appt with Dr. Gates on 4/16/24 and will discuss her foot/ankle pain during this appt as well as it maybe related to her Rheumatological conditions.        No follow-ups on file.    Harman Westfall, SANTHOSH  1/26/2024

## 2024-03-04 NOTE — TELEPHONE ENCOUNTER
Requested Prescriptions     Pending Prescriptions Disp Refills    hydroxychloroquine 200 MG Oral Tab 180 tablet 0     Sig: Take 2 tablets (400 mg total) by mouth daily. Start 1 pill daily for 2 weeks then increase to 2 pill daily      Next Appointment:  4/16/2024 5:40 PM Cass Gates MD [facility]     LOV: 02/03/2023  Last Refilled: 12/12/2023 #180 0rf        ASSESSMENT/PLAN:      Possible PMR symptoms appear more like Fibromyalgia  - Symptoms started after having Covid back in 2020 and again worsened after having the vaccine in September 2021  - She has a lot of joint pain involving her hands, wrists, shoulders and knees.  Also myalgias throughout her body  - In the past she had a negative KEILA that was positive.  Regarding connective tissue diseases she does not exhibit all the symptoms  - Further blood work showed a negative FEDE panel, normal RF and CCP.  CRP was slightly elevated at 0.83.    - Had s/e with HCQ  - Now on prednisone 5 mg daily, her symptoms of joint pain and muscle pain have improved, will continue for now   - Still unclear etiology  - Also on Cymbalta 20 mg daily     R knee pain and swelling  - X-ray of the right knee was ordered, right knee injected with 1 cc of lidocaine and 40 mg of Kenalog in sterile fashion.  Patient tolerated well     Bilateral ankle pain, left worse than right  - MRI L ankle denied, will try to see if we can do a peer to peer      Chronic back pain  - has seen physiatry, she wants to see neurosurgeon.  She will talk to her PCP     Positive KEILA  - It was negative in the past and now positive.  Neg FEDE panel     L trochanteric bursitis- improved  - L trochanteric bursa injected in November, noticed some improvement     Hx of Fibromyalgia  - Currently on tizanidine     Pt will f/u in 2 mos      Cass Gates MD  2/3/2023  3:20 PM

## 2024-04-16 NOTE — PROGRESS NOTES
Jewels Almanzar is a 58 year old female.    HPI:     Chief Complaint   Patient presents with    Foot Pain     Left      Hip Pain     Left     Back Pain       I had the pleasure of seeing Jewels Almanzar on 4/16/2024 for follow up +KEILA and polyarthralgia and myalgia, possible PMR.     Current Medications:  Norco 5/325 mg 1-2 times as needed  Tylenol arthritis daily  Tizanidine 2 mg nighty as needed   Previous Medications:  Prednisone 5 mg daily- started 12/8/2021- Sept 2022  Gabapentin 200 mg bid- June 2020- d/c recently because of s/e couldn't focus  Cymbalta- recently started it but had s/e  Amitriptyline- tried in the past but doesn't remember  Lyrica- tried in the past but didn't work   Tramadol- didn't help   mg daily - stopped due to easy bruising and some visual changes   Blood work:  Neg KEILA, ESR, RF, CCP  Now +KEILA 1:320, neg ESR , CRP, CBC, CMP (9/2021)  MRI C spine June 2020: normal  MRI Thoracic and Lumbar spine: mild DJD and facet disease  XR R knee 1/2020: mild OA greatest at PF cmpt  MRI R shoulder 2015: Minimal supraspinatus tendinosis, Small amount of fluid in the subacromial bursa. Bursitis?    Interval History:  This is a 56 yo F with hx of HTN, DM-2, Hypothyroid, Anxiety/Depression, Fibromyalgia presents with generalized pain.  She reports pain for many years but over the past 2 months it has worsened.  She has pain in her neck, mid back, shoulders, elbows, L hip, bilateral knees (L worse than R).  She did have COVID in May 2020.  States that her symptoms have worsened since then.  She has been on multiple medications in the past, most recently gabapentin and Cymbalta but both had side effects and did not work therefore she discontinued it.  She also has a lot of muscle pain throughout her body.  She is unable to abduct her left shoulder due to pain.  She also is unable to lay on her left side due to her left hip pain.  She was recently started on Norco last week, helps her pain  slightly.  Also taking Tylenol arthritis, meloxicam and tizanidine.     11/10/2021  Presents for reevaluation of polyarthralgias  She was seen back in October I received left shoulder and trochanteric bursa injections, they helped for about 2 to 3 months  She received her Covid vaccine in September 2021 afterwards developed worsening joint pain and muscle pain.  She would also have fluctuations in her blood pressure where her blood pressure dropped.  She has a lot of pain in her upper back, lower back and legs.  Knees are also very painful  Hands, fingers and wrist are painful.  Reports minimal swelling.  She has been taking hydrocodone twice a day now to help with some of her symptoms.  Blood work in the past showed negative KEILA and most recent blood work shows a positive KEILA now.  CRP was slightly increased to 0.86.  In addition to joint pain she also has a lot of muscle pain throughout her body  At times is hard to put her bra on or take off her shirt.  She does have full range of motion of both shoulders  She was given steroids back in May by her PCP, it did help her joint slightly  She also had Covid last year in 2020 and continues to have shortness of breath.  Also has memory issues    12/8/2021:   Presents for follow-up of worsening joint pain and muscle pain after having Covid in September 2021  Reports a lot of pain in the upper back, neck, lower back and legs.  Last month injected her left trochanteric bursa with cortisone, helped for a few days  Blood work showed negative FEDE panel.  RF and CCP were negative.  ESR was normal.  CRP was slightly elevated at 0.83  She was started on prednisone 20 mg daily for 2 weeks, felt 80% better in the shoulders and hips  Was able to move a lot better. Able to go down and upstairs with alternating legs but before was only able to go down 1 leg at a time  Was able to do crafting with her hands on the prednisone  Did not help the mid thoracic pain, still has to take 2  hydrocodone a day  She is also going to be seeing a neurologist for post Covid symptoms  Continues to have fluctuations in her blood pressure.  At times she will feel dizzy.  Will be getting a Holter monitor    1/19/2022:  Presents for f/u of possible PMR  Was started on prednisone 15 mg daily with a tapering dose  Now down to prednisone 5 mg daily  Pain was better in the shoulders and hips at a higher dose  Was able to go up and down the stairs easily on the higher dose  Went down to prednisone 10 mg daily felt more pain in neck/shoulders and also left trochanteric bursa region  Also has a lot of pain in the middle of the back with some numbness and tingling in the hands, has to take the norco due to the pain     5/18/2022:  Presents for f/u of possible PMR  She presented with a lot of joint pain and stiffness involving her shoulder, neck and hip region.  When she was placed on prednisone her symptoms improved significantly  She was weaned off prednisone back in March and started to have a lot of diffuse joint pain throughout her body.  She was seen by her PCP who put her back on prednisone 5 mg daily end of April, she is now down to 2.5 mg daily  She notices on the lower dose that her joint pain is not controlled.  Also going from 5 mg to 2.5 mg her GI symptoms are worse.    7/20/2022:  Presents for f/u of possible PMR  She presented with a lot of joint pain and stiffness involving her shoulder, neck and hip region.  When she was placed on prednisone her symptoms improved significantly  Now on prednisone 2 mg daily   Every time she remains down on prednisone her GI symptoms worsen.  She has significant diarrhea  She feels swollen all over, in her legs and face.  Continues to have pain in her upper back, shoulder blades and neck  Also continues to have a lot of mid lower back.  She has take the Norco to treat this back pain  Also having pain in left side of back, if sits for too long hard to get up and walk micheal in  the left lower back and the left lateral thigh can go numb  She states after having COVID and the vaccine all her symptoms worsen    11/2/2022:  Presents for f/u of overall joint pain  Had COVID again in September, was weaned off prednisone at that time. She was seen by PCP and was placed back on prednisone 5 mg daily, took it for 1 week then 2.5 mg daily for 1 week and felt better  Pain was better controlled on prednisone  Still having pain on  mg daily  Having easy bruising since starting HCQ  Having some changes in vision, things will look concave or convex. These started once started on HCQ  Has pain upper back, mid back and left hip pain  At time hands will go numb, happening daily now, when has more mid back pain has the numbness in the hands  Also having b/l ankle pain, swelling. Left is right than left      2/3/2023:  Presents for f/u for follow up +KEILA and polyarthralgia and myalgia, possible PMR.   Currently on prednisone 5 mg daily.  Also on Cymbalta 20 mg daily that was prescribed by her PCP  Her pain is better on prednisone.  Shoulders and hips and joints do feel better on prednisone.  She continues to have pain though but it is tolerable  Had a lot of pain in her left ankle.  At times it will swell.  MRI was ordered but not approved  She received to the ED due to dizziness and low blood pressure, they were concerned about migraines or adrenal insufficiency.  Advised patient that she is on a low-dose prednisone, unlikely adrenal insufficiency but recommend to see endocrinology  She also has pain and swelling in her right knee, its hard to walk or go up or down stairs.    4/16/2024:  Presents for f/u for follow up +KEILA and polyarthralgia and myalgia, possible PMR.   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  There  is no synovitis or erosions to suggest inflammatory arthritis  During her last visit her right knee was injected with cortisone  She had COVID and then fainted and worsened the left foot. She is seeing podiatry and was given an ankle brace but not helping. Its is also hard to put on, causing more back pain  Continues to have swelling in the left ankle  She is not taking prednisone, A1c was trending up on prednisone   Having more joint pain, left 4th finger is very painful in the morning and swollen and hard to bend  Pain also in the left wrist   Continues to have mid and lower back pain and its worsening, also worsening pain in the left hip. Left hip pain is significant, can't sleep at night due to the pain  Feels a lot of back pain and when she flares it causes left leg numbness and right hand numbness         HISTORY:  Past Medical History:    Allergic rhinitis    Anxiety state, unspecified    Bowel habit changes    colonoscopy 2010    Cholelithiasis    cholecystectomy 2009    COVID-19    Diabetes (HCC)    Disorder of thyroid    Endometriosis    Endometriosis/bleeding, Total Hysterectomy    Esophageal reflux    Fibromyalgia    High blood pressure    Hyperthyroidism    Hypothyroidism    Lipid screening    per NG    Obesity, unspecified    TMJ (temporomandibular joint disorder)    \"TMJ\"    Tonsillitis    tonsillectomy 1979    Unspecified sleep apnea    no cpap use      Social Hx Reviewed   Family Hx Reviewed     Medications (Active prior to today's visit):  Current Outpatient Medications   Medication Sig Dispense Refill    hydroxychloroquine 200 MG Oral Tab Take 2 tablets (400 mg total) by mouth daily. Start 1 pill daily for 2 weeks then increase to 2 pill daily 180 tablet 0    DULoxetine 20 MG Oral Cap DR Particles       metoprolol succinate ER 25 MG Oral Tablet 24 Hr TAKE 1 TABLET BY MOUTH DAILY AT BEDTIME 90 tablet 1    valsartan 80 MG Oral Tab Take 1 tablet (80 mg total) by mouth daily. 90 tablet 1     DICYCLOMINE 10 MG Oral Cap TAKE 1 CAPSULE BY MOUTH THREE TIMES DAILY AS NEEDED 30 capsule 0    Glucose Blood (ONETOUCH ULTRA) In Vitro Strip 1 each by Other route daily. 100 strip 1    butalbital-acetaminophen-caffeine -40 MG Oral Tab TAKE 1 TABLET BY MOUTH EVERY 6 HOURS AS NEEDED FOR PAIN 30 tablet 1    gabapentin 300 MG Oral Cap 1 capsule po q hs 30 capsule 5    empagliflozin (JARDIANCE) 10 MG Oral Tab Take 1 tablet (10 mg total) by mouth daily. 90 tablet 1    JANUVIA 100 MG Oral Tab TAKE 1 TABLET(100 MG) BY MOUTH DAILY 90 tablet 1    LEVOTHYROXINE 125 MCG Oral Tab TAKE 1 TABLET(125 MCG) BY MOUTH BEFORE BREAKFAST 90 tablet 1    HYDROcodone-acetaminophen 5-325 MG Oral Tab Take 1 tablet by mouth 2 (two) times daily as needed for Pain. 45 tablet 0    clonazePAM 0.5 MG Oral Tab Take 1 tablet (0.5 mg total) by mouth nightly as needed (insomnia). 30 tablet 3    omeprazole 20 MG Oral Capsule Delayed Release Take 1 capsule (20 mg total) by mouth every morning. 90 capsule 1    Lancets (ONETOUCH DELICA PLUS [id number]) Does not apply Misc 1 lancet by Finger stick route daily. 100 each 3    Clobetasol Propionate 0.05 % External Ointment       Insulin Pen Needle (PEN NEEDLES) 32G X 4 MM Does not apply Misc 1 each by Does not apply route every 7 days. 30 each 2    Fluticasone Propionate 50 MCG/ACT Nasal Suspension 1 PUFF EACH NOSTRIL 2 TIMES A  DAY 1 Bottle 3    Cranberry 250 MG Oral Tab Take 2 tablets by mouth daily as needed.      predniSONE 5 MG Oral Tab Take 1 tablet (5 mg total) by mouth daily. (Patient not taking: Reported on 4/16/2024) 90 tablet 0    ERGOCALCIFEROL 1.25 MG (84988 UT) Oral Cap TAKE 1 CAPSULE BY MOUTH 1 TIME A WEEK (Patient not taking: Reported on 4/16/2024) 12 capsule 0    lidocaine-menthol 4-1 % External Patch Place 1 patch onto the skin daily as needed. (Patient not taking: Reported on 4/16/2024) 7 patch 0     .cmed  Allergies:  Allergies   Allergen Reactions    Morphine PAIN     migraine     Prochlorperazine JITTERY     Compazine    Prochlorperazine Edisylate JITTERY    Sumatriptan FATIGUE     Left side weakness  Left side weakness  Other reaction(s): weakness         ROS:   All other ROS are negative.     PHYSICAL EXAM:   GEN: AAOx3, NAD  HEENT: EOMI, PERRLA, no injection or icterus, oral mucosa moist, no oral lesions. No lymphadenopathy. No facial rash  CVS: RRR, no murmurs rubs or gallops. Equal 2+ distal pulses.   LUNGS: CTAB, no increased work of breathing  ABDOMEN:  soft NT/ND, +BS, no HSM  SKIN: No rashes or skin lesions. No nail findings  MSK:  TTP in paraspinal region of her spine, shoulders, trapezius region, trochanteric bursa's and quadriceps  Cervical spine: FROM  Hands: TTP in MCPs and PIPs, able to make a fist  Wrist: Left wrist TTP, pain with extension   Elbow: TTP in b/l lateral epicondyle region  Shoulders: Left shoulder abduction limited to 90 degrees   Hip: normal log roll, +TTP in L trochanteric bursa, NEGAR test negative b/l  Knees: R knee TTP  Ankles: FROM, no pain or swelling or warmth on palpation  Feet: no pain with MTP squeeze, no toe swelling or pain or warmth on palpation with FROM  Spine: no lumbar or sacral pain on palpation.  NEURO: Cranial nerves II-XII intact grossly. 5/5 strength throughout in both upper and lower extremities, sensation intact.  PSYCH: normal mood       LABS:     Component      Latest Ref Rng & Units 9/20/2021 6/3/2021 4/13/2021 8/22/2020   KEILA Titer/Pattern      <80 320 (A)      Reviewed By:       Osvaldo Bishop M.D.      KEILA SCREEN      Negative Positive (A)   Negative   SED RATE      0 - 30 mm/Hr 11  8 9   RHEUMATOID FACTOR      <15 IU/mL    <10   C-Citrullinated Peptide IgG AB      0.0 - 6.9 U/mL    0.8   C-REACTIVE PROTEIN      <0.30 mg/dL 0.47 (H) 0.86 (H) 0.52 (H)      Imaging:     MRI Left ankle 11/2023:  No synovitis or osseous erosions to suggest inflammatory arthritis.   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    Mild peroneal tenosynovitis.   Mild Achilles paratenonitis.     MRI L spine 6/2020:  Mild degenerative disc and facet disease throughout the lumbar spine without high-grade canal or foraminal narrowing.    No acute fracture, dislocation or marrow replacing lesion within lumbar spine.  No abnormal enhancement.     MRI T spine:  Mild endplate degenerative changes throughout the thoracic spine without high-grade canal or foraminal narrowing.     MRI C spine:   No acute abnormality.  No disc disease.  No central canal or neural foraminal narrowing.  No cord signal change or marrow signal abnormality.  No enhancing osseous or cord lesion.     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  Also myalgias throughout her body  - In the past she had a negative KEILA that was positive.  Regarding connective tissue diseases she does not exhibit all the symptoms  - Further blood work showed a negative FEDE panel, normal RF and CCP.  CRP was slightly elevated at 0.83.    - Had s/e with HCQ  - Joints do feel better on prednisone but it caused her A1c to increase  - Having worsening pain in her left ankle, left index finger with swelling and left wrist.  She has been seeing podiatry for her left ankle and currently using a brace.  MRI of the left ankle showed no synovitis but chronic partial-thickness tear of the talofibular ligament, mild peroneal and Achilles tendinitis  - Symptoms concerning for possible spondylarthritis.  Plan to obtain further blood work, x-rays of the SI joint and ultrasound of the hands    R knee pain- stable   - R knee injected with cortisone last year     Bilateral ankle pain, left  worse than right  - MRI L ankle showing more tendon issues, no synovitis  - seeing podiatry and in a brace but not helping     Chronic back pain  - She has a lot of her pain is in her mid back and lower spine.  She was seen by physiatry in the past.  She will try Flexeril once or twice a day to see if that helps    Positive KEILA  - It was negative in the past and now positive.  Neg FEDE panel  L trochanteric bursitis- improved  - L trochanteric bursa injected in November, noticed some improvement  Hx of Fibromyalgia  - Off tizanidine and Cymbalta, she has tried multiple other medications in the past either had side effects or did not work    Spent 40 minutes obtaining history, evaluating patient, reviewing labs, discussing treatment options and completing documentation    Pt will f/u in 3 mos     Cass Gates MD  4/16/2024  5:40 PM

## 2024-06-18 NOTE — TELEPHONE ENCOUNTER
Requested Prescriptions     Pending Prescriptions Disp Refills    cyclobenzaprine 5 MG Oral Tab 60 tablet 0     Sig: Take 1 tablet (5 mg total) by mouth 2 (two) times daily as needed for Muscle spasms.     Future Appointments   Date Time Provider Department Center   7/15/2024  2:00 PM [facility] RM6 [facility]   7/15/2024  3:00 PM [facility] RM6 [facility]   7/31/2024  6:45 PM [facility] RM2 (3T WIDE) [facility]     LOV: 4/16/24   Last Refilled:4/16/24 #60 0RF   Labs:     Component      Latest Ref Rng 4/16/2024   C-Citrullinated Peptide IgG AB      0.0 - 6.9 U/mL 0.5    C-REACTIVE PROTEIN      <1.00 mg/dL 0.50    SED RATE      0 - 30 mm/Hr 21    RHEUMATOID FACTOR      <14 IU/mL <10    [id number] Negative           ASSESSMENT/PLAN:      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  Also myalgias throughout her body  - In the past she had a negative KEILA that was positive.  Regarding connective tissue diseases she does not exhibit all the symptoms  - Further blood work showed a negative FEDE panel, normal RF and CCP.  CRP was slightly elevated at 0.83.    - Had s/e with HCQ  - Joints do feel better on prednisone but it caused her A1c to increase  - Having worsening pain in her left ankle, left index finger with swelling and left wrist.  She has been seeing podiatry for her left ankle and currently using a brace.  MRI of the left ankle showed no synovitis but chronic partial-thickness tear of the talofibular ligament, mild peroneal and Achilles tendinitis  - Symptoms concerning for possible spondylarthritis.  Plan to obtain further blood work, x-rays of the SI joint and ultrasound of the hands     R knee pain- stable   - R knee injected with cortisone last year      Bilateral ankle pain, left worse than right  - MRI L ankle  showing more tendon issues, no synovitis  - seeing podiatry and in a brace but not helping      Chronic back pain  - She has a lot of her pain is in her mid back and lower spine.  She was seen by physiatry in the past.  She will try Flexeril once or twice a day to see if that helps     Positive KEILA  - It was negative in the past and now positive.  Neg FEDE panel  L trochanteric bursitis- improved  - L trochanteric bursa injected in November, noticed some improvement  Hx of Fibromyalgia  - Off tizanidine and Cymbalta, she has tried multiple other medications in the past either had side effects or did not work     Spent 40 minutes obtaining history, evaluating patient, reviewing labs, discussing treatment options and completing documentation     Pt will f/u in 3 mos      Cass Gates MD  4/16/2024  5:40 PM

## 2024-09-24 NOTE — TELEPHONE ENCOUNTER
Order pended.     Future Appointments  Future Appointments (Rheumatology)       Visit Type Date Time Department    MYCHART FOLLOW UP SPECIALTY 11/26/2024  3:00 PM [facility]            Recent Appointments  Recent Outpatient Visits              [facility]    02/03/2023 [facility] of [facility]    11/02/2022 [facility] left [facility]    07/20/2022 Chronic left-sided low back pain with left-sided sciatica    [facility]    05/18/2022 PMR (polymyalgia rheumatica) ([facility]            Recent Labs (6 months)  No results found for this or any previous visit (from the past 4380 hour(s)).  No results found for: \"AST\"  No results found for: \"ALT\"  Lab Results   Component Value Date    ESRML 21 04/16/2024     No results found for: \"ALB\"  No results found for: \"CREATSERUM\"  Lab Results   Component Value Date    ESRML 21 04/16/2024     Lab Results   Component Value Date    CRP 0.50 04/16/2024     No results found for: \"URIC\"  No results found for: \"QFGOLDRESULT\"

## 2024-09-24 NOTE — TELEPHONE ENCOUNTER
Current Outpatient Medications   Medication Sig Dispense Refill    cyclobenzaprine 5 MG Oral Tab Take 1 tablet (5 mg total) by mouth 2 (two) times daily as needed for Muscle spasms. 60 tablet 0

## 2024-11-06 NOTE — TELEPHONE ENCOUNTER
She does not need to reschedule, but I changed it from follow up to consult since it has been over 3 years since last seen.

## 2024-11-06 NOTE — PATIENT INSTRUCTIONS
You were seen today for joint and muscle pain  You have the positive KEILA some swelling on ultrasound in your left middle finger, intermittent rashes  There may be a component undifferentiated connective tissue disease  I also think your symptoms are related to fibromyalgia  Try low-dose hydroxychloroquine 200 mg daily which will be 1 pill a day

## 2024-11-06 NOTE — TELEPHONE ENCOUNTER
Patient was referred back to Dr. Betts by Dr. Cass Gates from rheumatology. She last seen back in June 2021 for her CLN. Follow up appointment was made for Jan 2025. Is patient considered a new patient to Dr. Betts or is the follow up appt ok to keep?

## 2024-11-06 NOTE — PROGRESS NOTES
Jewels Almanzar is a 59 year old female.    HPI:     Chief Complaint   Patient presents with    Follow - Up    Shoulder Pain     Andrade     Hip Pain     Left        I had the pleasure of seeing Jewels Almanzar on 11/6/2024 for follow up +KEILA and polyarthralgia and myalgia     Current Medications:  Norco 5/325 mg 1-2 times as needed  Tylenol arthritis daily  Flexeril 5 mg nightly as needed   Previous Medications:  Prednisone 5 mg daily- started 12/8/2021- Sept 2022  Gabapentin 200 mg bid- June 2020- d/c recently because of s/e couldn't focus  Cymbalta- recently started it but had s/e  Amitriptyline- tried in the past but doesn't remember  Lyrica- tried in the past but didn't work   Tramadol- didn't help   mg daily - stopped due to easy bruising and some visual changes   Blood work:  Neg KEILA, ESR, RF, CCP, HLA-B27  Now +KEILA 1:320, neg ESR , CRP, CBC, CMP (9/2021)  MRI C spine June 2020: normal  MRI Thoracic and Lumbar spine: mild DJD and facet disease  XR R knee 1/2020: mild OA greatest at PF cmpt  MRI R shoulder 2015: Minimal supraspinatus tendinosis, Small amount of fluid in the subacromial bursa. Bursitis?    Interval History:  This is a 54 yo F with hx of HTN, DM-2, Hypothyroid, Anxiety/Depression, Fibromyalgia presents with generalized pain.  She reports pain for many years but over the past 2 months it has worsened.  She has pain in her neck, mid back, shoulders, elbows, L hip, bilateral knees (L worse than R).  She did have COVID in May 2020.  States that her symptoms have worsened since then.  She has been on multiple medications in the past, most recently gabapentin and Cymbalta but both had side effects and did not work therefore she discontinued it.  She also has a lot of muscle pain throughout her body.  She is unable to abduct her left shoulder due to pain.  She also is unable to lay on her left side due to her left hip pain.  She was recently started on Norco last week, helps her pain slightly.   Also taking Tylenol arthritis, meloxicam and tizanidine.     11/10/2021  Presents for reevaluation of polyarthralgias  She was seen back in October I received left shoulder and trochanteric bursa injections, they helped for about 2 to 3 months  She received her Covid vaccine in September 2021 afterwards developed worsening joint pain and muscle pain.  She would also have fluctuations in her blood pressure where her blood pressure dropped.  She has a lot of pain in her upper back, lower back and legs.  Knees are also very painful  Hands, fingers and wrist are painful.  Reports minimal swelling.  She has been taking hydrocodone twice a day now to help with some of her symptoms.  Blood work in the past showed negative KEILA and most recent blood work shows a positive KEILA now.  CRP was slightly increased to 0.86.  In addition to joint pain she also has a lot of muscle pain throughout her body  At times is hard to put her bra on or take off her shirt.  She does have full range of motion of both shoulders  She was given steroids back in May by her PCP, it did help her joint slightly  She also had Covid last year in 2020 and continues to have shortness of breath.  Also has memory issues    12/8/2021:   Presents for follow-up of worsening joint pain and muscle pain after having Covid in September 2021  Reports a lot of pain in the upper back, neck, lower back and legs.  Last month injected her left trochanteric bursa with cortisone, helped for a few days  Blood work showed negative FEDE panel.  RF and CCP were negative.  ESR was normal.  CRP was slightly elevated at 0.83  She was started on prednisone 20 mg daily for 2 weeks, felt 80% better in the shoulders and hips  Was able to move a lot better. Able to go down and upstairs with alternating legs but before was only able to go down 1 leg at a time  Was able to do crafting with her hands on the prednisone  Did not help the mid thoracic pain, still has to take 2 hydrocodone a  day  She is also going to be seeing a neurologist for post Covid symptoms  Continues to have fluctuations in her blood pressure.  At times she will feel dizzy.  Will be getting a Holter monitor    1/19/2022:  Presents for f/u of possible PMR  Was started on prednisone 15 mg daily with a tapering dose  Now down to prednisone 5 mg daily  Pain was better in the shoulders and hips at a higher dose  Was able to go up and down the stairs easily on the higher dose  Went down to prednisone 10 mg daily felt more pain in neck/shoulders and also left trochanteric bursa region  Also has a lot of pain in the middle of the back with some numbness and tingling in the hands, has to take the norco due to the pain     5/18/2022:  Presents for f/u of possible PMR  She presented with a lot of joint pain and stiffness involving her shoulder, neck and hip region.  When she was placed on prednisone her symptoms improved significantly  She was weaned off prednisone back in March and started to have a lot of diffuse joint pain throughout her body.  She was seen by her PCP who put her back on prednisone 5 mg daily end of April, she is now down to 2.5 mg daily  She notices on the lower dose that her joint pain is not controlled.  Also going from 5 mg to 2.5 mg her GI symptoms are worse.    7/20/2022:  Presents for f/u of possible PMR  She presented with a lot of joint pain and stiffness involving her shoulder, neck and hip region.  When she was placed on prednisone her symptoms improved significantly  Now on prednisone 2 mg daily   Every time she remains down on prednisone her GI symptoms worsen.  She has significant diarrhea  She feels swollen all over, in her legs and face.  Continues to have pain in her upper back, shoulder blades and neck  Also continues to have a lot of mid lower back.  She has take the Norco to treat this back pain  Also having pain in left side of back, if sits for too long hard to get up and walk micheal in the left lower  back and the left lateral thigh can go numb  She states after having COVID and the vaccine all her symptoms worsen    11/2/2022:  Presents for f/u of overall joint pain  Had COVID again in September, was weaned off prednisone at that time. She was seen by PCP and was placed back on prednisone 5 mg daily, took it for 1 week then 2.5 mg daily for 1 week and felt better  Pain was better controlled on prednisone  Still having pain on  mg daily  Having easy bruising since starting HCQ  Having some changes in vision, things will look concave or convex. These started once started on HCQ  Has pain upper back, mid back and left hip pain  At time hands will go numb, happening daily now, when has more mid back pain has the numbness in the hands  Also having b/l ankle pain, swelling. Left is right than left      2/3/2023:  Presents for f/u for follow up +KEILA and polyarthralgia and myalgia, possible PMR.   Currently on prednisone 5 mg daily.  Also on Cymbalta 20 mg daily that was prescribed by her PCP  Her pain is better on prednisone.  Shoulders and hips and joints do feel better on prednisone.  She continues to have pain though but it is tolerable  Had a lot of pain in her left ankle.  At times it will swell.  MRI was ordered but not approved  She received to the ED due to dizziness and low blood pressure, they were concerned about migraines or adrenal insufficiency.  Advised patient that she is on a low-dose prednisone, unlikely adrenal insufficiency but recommend to see endocrinology  She also has pain and swelling in her right knee, its hard to walk or go up or down stairs.    4/16/2024:  Presents for f/u for follow up +KEILA and polyarthralgia and myalgia, possible PMR.   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  There is no synovitis  or erosions to suggest inflammatory arthritis  During her last visit her right knee was injected with cortisone  She had COVID and then fainted and worsened the left foot. She is seeing podiatry and was given an ankle brace but not helping. Its is also hard to put on, causing more back pain  Continues to have swelling in the left ankle  She is not taking prednisone, A1c was trending up on prednisone   Having more joint pain, left 4th finger is very painful in the morning and swollen and hard to bend  Pain also in the left wrist   Continues to have mid and lower back pain and its worsening, also worsening pain in the left hip. Left hip pain is significant, can't sleep at night due to the pain  Feels a lot of back pain and when she flares it causes left leg numbness and right hand numbness     11/6/2024:  Presents for f/u for follow up +KEILA and polyarthralgia and myalgia,   She had US hands showing synovial thickening and hyperemia in the left middle finger MCO joint consistent with active synovitis.  MRI left ankle showed chronic partial thickness tear of talofibular ligament and mild peroneal and achilles tendonitis   Also had MRI thoracic spine showing mild DJD  Continues to have joint pain in the hands, shoulders, hips and mid back   Also has diffuse muscle pain micheal the arms and left leg  Joints feel better on higher dose of prednisone   She is now on Rhu tox homeopathic medication and helping hand pain  Now has a lacy rash in the lower legs         HISTORY:  Past Medical History:    Allergic rhinitis    Anxiety state, unspecified    Bowel habit changes    colonoscopy 2010    Cholelithiasis    cholecystectomy 2009    COVID-19    Diabetes (HCC)    Disorder of thyroid    Endometriosis    Endometriosis/bleeding, Total Hysterectomy    Esophageal reflux    Fibromyalgia    High blood pressure    Hyperthyroidism    Hypothyroidism    Lipid screening    per NG    Obesity, unspecified    TMJ (temporomandibular joint  disorder)    \"TMJ\"    Tonsillitis    tonsillectomy 1979    Unspecified sleep apnea    no cpap use      Social Hx Reviewed   Family Hx Reviewed     Medications (Active prior to today's visit):  Current Outpatient Medications   Medication Sig Dispense Refill    cyclobenzaprine 5 MG Oral Tab Take 1 tablet (5 mg total) by mouth 2 (two) times daily as needed for Muscle spasms. 60 tablet 0    metoprolol succinate ER 25 MG Oral Tablet 24 Hr TAKE 1 TABLET BY MOUTH DAILY AT BEDTIME (Patient not taking: Reported on 11/6/2024) 90 tablet 1    valsartan 80 MG Oral Tab Take 1 tablet (80 mg total) by mouth daily. 90 tablet 1    DICYCLOMINE 10 MG Oral Cap TAKE 1 CAPSULE BY MOUTH THREE TIMES DAILY AS NEEDED (Patient not taking: Reported on 11/6/2024) 30 capsule 0    Glucose Blood (ONETOUCH ULTRA) In Vitro Strip 1 each by Other route daily. 100 strip 1    butalbital-acetaminophen-caffeine -40 MG Oral Tab TAKE 1 TABLET BY MOUTH EVERY 6 HOURS AS NEEDED FOR PAIN 30 tablet 1    empagliflozin (JARDIANCE) 10 MG Oral Tab Take 1 tablet (10 mg total) by mouth daily. 90 tablet 1    JANUVIA 100 MG Oral Tab TAKE 1 TABLET(100 MG) BY MOUTH DAILY 90 tablet 1    LEVOTHYROXINE 125 MCG Oral Tab TAKE 1 TABLET(125 MCG) BY MOUTH BEFORE BREAKFAST 90 tablet 1    HYDROcodone-acetaminophen 5-325 MG Oral Tab Take 1 tablet by mouth 2 (two) times daily as needed for Pain. 45 tablet 0    clonazePAM 0.5 MG Oral Tab Take 1 tablet (0.5 mg total) by mouth nightly as needed (insomnia). 30 tablet 3    omeprazole 20 MG Oral Capsule Delayed Release Take 1 capsule (20 mg total) by mouth every morning. (Patient not taking: Reported on 11/6/2024) 90 capsule 1    ERGOCALCIFEROL 1.25 MG (46249 UT) Oral Cap TAKE 1 CAPSULE BY MOUTH 1 TIME A WEEK (Patient not taking: Reported on 11/6/2024) 12 capsule 0    Lancets (ONETOUCH DELICA PLUS [id number]) Does not apply Misc 1 lancet by Finger stick route daily. 100 each 3    Clobetasol Propionate 0.05 % External Ointment  (Patient  not taking: Reported on 11/6/2024)      Insulin Pen Needle (PEN NEEDLES) 32G X 4 MM Does not apply Misc 1 each by Does not apply route every 7 days. 30 each 2    lidocaine-menthol 4-1 % External Patch Place 1 patch onto the skin daily as needed. 7 patch 0    Fluticasone Propionate 50 MCG/ACT Nasal Suspension 1 PUFF EACH NOSTRIL 2 TIMES A  DAY (Patient not taking: Reported on 11/6/2024) 1 Bottle 3    Cranberry 250 MG Oral Tab Take 2 tablets by mouth daily as needed.       .cmed  Allergies:  Allergies   Allergen Reactions    Morphine PAIN     migraine    Prochlorperazine JITTERY     Compazine    Prochlorperazine Edisylate JITTERY    Sumatriptan FATIGUE     Left side weakness  Left side weakness  Other reaction(s): weakness         ROS:   All other ROS are negative.     PHYSICAL EXAM:   GEN: AAOx3, NAD  HEENT: EOMI, PERRLA, no injection or icterus, oral mucosa moist, no oral lesions. No lymphadenopathy. No facial rash  CVS: RRR, no murmurs rubs or gallops. Equal 2+ distal pulses.   LUNGS: CTAB, no increased work of breathing  ABDOMEN:  soft NT/ND, +BS, no HSM  SKIN: No rashes or skin lesions. No nail findings  MSK:  TTP in paraspinal region of her spine, shoulders, trapezius region, trochanteric bursa's and quadriceps  Cervical spine: FROM  Hands: TTP in MCPs and PIPs, able to make a fist  Wrist: Left wrist TTP, pain with extension   Elbow: TTP in b/l lateral epicondyle region  Shoulders: Left shoulder abduction limited to 90 degrees   Hip: normal log roll, +TTP in L trochanteric bursa, NEGAR test negative b/l  Knees: R knee TTP  Ankles: FROM, no pain or swelling or warmth on palpation  Feet: no pain with MTP squeeze, no toe swelling or pain or warmth on palpation with FROM  Spine: no lumbar or sacral pain on palpation.  NEURO: Cranial nerves II-XII intact grossly. 5/5 strength throughout in both upper and lower extremities, sensation intact.  PSYCH: normal mood       LABS:     Component      Latest Ref Rng & Units  9/20/2021 6/3/2021 4/13/2021 8/22/2020   KEILA Titer/Pattern      <80 320 (A)      Reviewed By:       Osvaldo Bishop M.D.      KEILA SCREEN      Negative Positive (A)   Negative   SED RATE      0 - 30 mm/Hr 11  8 9   RHEUMATOID FACTOR      <15 IU/mL    <10   C-Citrullinated Peptide IgG AB      0.0 - 6.9 U/mL    0.8   C-REACTIVE PROTEIN      <0.30 mg/dL 0.47 (H) 0.86 (H) 0.52 (H)      Imaging:     MRI Left ankle 11/2023:  No synovitis or osseous erosions to suggest inflammatory arthritis.   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   Mild peroneal tenosynovitis.   Mild Achilles paratenonitis.     MRI L spine 6/2020:  Mild degenerative disc and facet disease throughout the lumbar spine without high-grade canal or foraminal narrowing.    No acute fracture, dislocation or marrow replacing lesion within lumbar spine.  No abnormal enhancement.     MRI T spine:  Mild endplate degenerative changes throughout the thoracic spine without high-grade canal or foraminal narrowing.     MRI C spine:   No acute abnormality.  No disc disease.  No central canal or neural foraminal narrowing.  No cord signal change or marrow signal abnormality.  No enhancing osseous or cord lesion.     XR R knee:  CONCLUSION:   Stable mild right knee joint osteoarthritis with greatest involvement of the patellofemoral compartment    ASSESSMENT/PLAN:     UCTD (+KEILA, joint pain, active synovitis in left 3rd MCP, livido reticularis)  - Symptoms started after having Covid back in 2020 and again worsened after having the vaccine in September 2021  - She has a lot of joint pain involving her hands, wrists, shoulders and knees.  Also myalgias throughout her body  - In the past she had a negative KEILA that was positive. Further blood work showed a negative FEDE panel, normal RF and CCP.  Normal inflammation markers   - MRI of the left ankle showed no synovitis but chronic partial-thickness tear of the talofibular ligament, mild peroneal and  Achilles tendinitis  - US hand some active synovitis in the left third MCP  - Had s/e with HCQ but willing to try again   - Joints do feel better on prednisone but it caused her A1c to increase  - Having worsening pain in her left ankle, left index finger with swelling and left wrist.    - Will try hydroxychloroquine 20 mg daily.  She is also on Flexeril as needed    R knee pain- stable   - R knee injected with cortisone last year     Bilateral ankle pain, left worse than right  - MRI L ankle showing more tendon issues, no synovitis  - seeing podiatry and in a brace but not helping     Chronic back pain  - She has a lot of her pain is in her mid back and lower spine.  She was seen by physiatry in the past.  She will try Flexeril once or twice a day to see if that helps  - MRI showing some mild disc disease    L trochanteric bursitis- improved  - L trochanteric bursa injected in November, noticed some improvement  Hx of Fibromyalgia  - Off tizanidine and Cymbalta, she has tried multiple other medications in the past either had side effects or did not work    Pt will f/u in 3 mos     There is a longitudinal care relationship with me, the care plan reflects the ongoing nature of the continuous relationship of care, and the medical record indicates that there is ongoing treatment of a serious/complex medical condition which I am currently managing.  is Applicable.     Cass Gates MD  11/6/2024  2:52 PM

## 2024-11-14 NOTE — PROGRESS NOTES
[facility] Urology  Follow Up Visit    HPI:   Jewels Almanzar is a 59 year old female here today for recurrent UTI. The patient was previously seen by Dr Shabazz on 04/13/2021.     The patient was seen in 2021 for evaluation of overactive bladder symptoms including nocturia x1, urgency most notably occasional urge incontinence, suprapubic pressure and pain. Multiple urine cultures done to rule out UTIs have been negative. Some urinalyses have demonstrated pyuria but no microscopic hematuria. CT abdomen and pelvis with contrast performed March 31, 2021 showing some nonspecific renal scarring otherwise unremarkable for hydronephrosis masses or stones. She states she had the symptoms for about 2 years. No exacerbating or improving factors. She has had long Covid since she was diagnosed May 2020. She states that the problem is unchanged. Symptoms include see above. She denies any other complaints.     No positive urine cultures in system dating back to 2015. States she usually goes to Urgent Care for treatment when she has a UTI. Was last treated 2 months ago. States she has a UTI every 3-6 months typically.     Also complains of urgency, frequency, and stress incontinence. Previously took Oxybutynin and had positive results, however stopped it due to the risk of side effects. Would like to avoid medications for her symptoms. Does Kegel exercises regularly.     Strong Family h/o  malignancy: Brother passed away from kidney cancer, sister also has kidney cancer. Was told it was a very rare type with strong genetic component.     11/08/2024 RBUS showed 2.6 cm hypoechoic mass of the medial right kidney.  While this could represent a complex cystic renal lesion with internal proteinaceous or hemorrhagic components, underlying solid renal mass would be the differential concern.  Recommend further   evaluation with renal protocol MRI or CT. No hydronephrosis or obstructing renal stones.     Past Medical History:     Allergic rhinitis    Anxiety state, unspecified    Bowel habit changes    colonoscopy     Cholelithiasis    cholecystectomy 2009    COVID-19    Diabetes (HCC)    Disorder of thyroid    Endometriosis    Endometriosis/bleeding, Total Hysterectomy    Esophageal reflux    Fibromyalgia    High blood pressure    Hyperthyroidism    Hypothyroidism    Lipid screening    per NG    Obesity, unspecified    TMJ (temporomandibular joint disorder)    \"TMJ\"    Tonsillitis    tonsillectomy     Unspecified sleep apnea    no cpap use     Past Surgical History:   Procedure Laterality Date    Appendectomy  1996    Appendectomy  1994    Cholecystectomy      Colonoscopy  2010    per NG    Colonoscopy  2014    Colonoscopy N/A 2021    Procedure: COLONOSCOPY;  Surgeon: Cyril Betts MD;  Location: [facility] ENDOSCOPY    Hysterectomy      total    Hysterectomy      Needle biopsy left      Benign      1992,93    Other surgical history      endiometrosis    Tonsillectomy      Tonsillectomy      Total abdom hysterectomy       Family History   Problem Relation Age of Onset    Heart Attack Father 75        MI    Diabetes Father     Hypertension Father     Lipids Father     Other (Other) Father         Rheumatoid Arthritis    Heart Attack Mother         MI    Heart Disorder Mother     Hypertension Mother     Heart Attack Other         MI AT 45/MI AT 48 AFTER CHEMO, SISTERS X 2     Breast Cancer Sister 38    Glaucoma Sister         patient not sure but thinks she has glc    Cancer Brother 49        KIDNEY    Cancer Brother     Cancer Sister     Cancer Sister     Cancer Maternal Grandmother     Ovarian Cancer Maternal Grandmother 60    Diabetes Sister     Diabetes Sister     Obesity Sister     Obesity Sister     Obesity Sister      Social History     Socioeconomic History    Marital status:    Tobacco Use    Smoking status: Never    Smokeless tobacco: Never   Vaping Use    Vaping status: Never  Used   Substance and Sexual Activity    Alcohol use: Never     Alcohol/week: 0.0 standard drinks of alcohol    Drug use: No   Other Topics Concern    Caffeine Concern Yes     Comment: 12 oz coffee per day, and 1 can diet Cola per day.    Exercise No   Social History Narrative    The patient does not use an assistive device..  Soft cervical collar at times when sitting at computer or craft work.    The patient does live in a home with stairs.     Current Outpatient Medications   Medication Sig Dispense Refill    hydroxychloroquine 200 MG Oral Tab Take 1 tablet (200 mg total) by mouth daily. 180 tablet 0    cyclobenzaprine 5 MG Oral Tab Take 1 tablet (5 mg total) by mouth 2 (two) times daily as needed for Muscle spasms. 60 tablet 0    metoprolol succinate ER 25 MG Oral Tablet 24 Hr TAKE 1 TABLET BY MOUTH DAILY AT BEDTIME (Patient not taking: Reported on 11/6/2024) 90 tablet 1    valsartan 80 MG Oral Tab Take 1 tablet (80 mg total) by mouth daily. 90 tablet 1    DICYCLOMINE 10 MG Oral Cap TAKE 1 CAPSULE BY MOUTH THREE TIMES DAILY AS NEEDED (Patient not taking: Reported on 11/6/2024) 30 capsule 0    Glucose Blood (ONETOUCH ULTRA) In Vitro Strip 1 each by Other route daily. 100 strip 1    butalbital-acetaminophen-caffeine -40 MG Oral Tab TAKE 1 TABLET BY MOUTH EVERY 6 HOURS AS NEEDED FOR PAIN 30 tablet 1    empagliflozin (JARDIANCE) 10 MG Oral Tab Take 1 tablet (10 mg total) by mouth daily. 90 tablet 1    JANUVIA 100 MG Oral Tab TAKE 1 TABLET(100 MG) BY MOUTH DAILY 90 tablet 1    LEVOTHYROXINE 125 MCG Oral Tab TAKE 1 TABLET(125 MCG) BY MOUTH BEFORE BREAKFAST 90 tablet 1    HYDROcodone-acetaminophen 5-325 MG Oral Tab Take 1 tablet by mouth 2 (two) times daily as needed for Pain. 45 tablet 0    clonazePAM 0.5 MG Oral Tab Take 1 tablet (0.5 mg total) by mouth nightly as needed (insomnia). 30 tablet 3    omeprazole 20 MG Oral Capsule Delayed Release Take 1 capsule (20 mg total) by mouth every morning. (Patient not  taking: Reported on 11/6/2024) 90 capsule 1    ERGOCALCIFEROL 1.25 MG (30322 UT) Oral Cap TAKE 1 CAPSULE BY MOUTH 1 TIME A WEEK (Patient not taking: Reported on 11/6/2024) 12 capsule 0    Lancets (ONETOUCH DELICA PLUS [id number]) Does not apply Misc 1 lancet by Finger stick route daily. 100 each 3    Clobetasol Propionate 0.05 % External Ointment  (Patient not taking: Reported on 11/6/2024)      Insulin Pen Needle (PEN NEEDLES) 32G X 4 MM Does not apply Misc 1 each by Does not apply route every 7 days. 30 each 2    lidocaine-menthol 4-1 % External Patch Place 1 patch onto the skin daily as needed. 7 patch 0    Fluticasone Propionate 50 MCG/ACT Nasal Suspension 1 PUFF EACH NOSTRIL 2 TIMES A  DAY (Patient not taking: Reported on 11/6/2024) 1 Bottle 3    Cranberry 250 MG Oral Tab Take 2 tablets by mouth daily as needed.         Allergies: Morphine, Prochlorperazine, Prochlorperazine edisylate, and Sumatriptan    REVIEW OF SYSTEMS:  Review of Systems   All other systems reviewed and are negative.        EXAM:  LMP  (LMP Unknown)     Physical Exam  Constitutional:       Appearance: Normal appearance.   HENT:      Head: Normocephalic and atraumatic.      Mouth/Throat:      Mouth: Mucous membranes are moist.   Pulmonary:      Effort: Pulmonary effort is normal.   Abdominal:      General: Abdomen is flat.      Palpations: Abdomen is soft.   Skin:     General: Skin is warm and dry.   Neurological:      Mental Status: She is alert and oriented to person, place, and time.   Psychiatric:         Mood and Affect: Mood normal.         Thought Content: Thought content normal.          LABS:  No results found for: \"PSA\", \"QPSA\", \"TOTPSASCREEN\"  Lab Results   Component Value Date    WBC 7.2 12/18/2023    RBC 5.59 (H) 12/18/2023    HGB 14.7 12/18/2023    HCT 44.1 12/18/2023    MCV 78.9 (L) 12/18/2023    MCH 26.3 12/18/2023    MCHC 33.3 12/18/2023    RDW 13.9 12/18/2023    .0 12/18/2023    MPV 7.6 04/30/2018     Lab Results    Component Value Date     (H) 12/18/2023    BUN 12 12/18/2023    BUNCREA 13.6 12/18/2023    CREATSERUM 0.88 12/18/2023    ANIONGAP 7 12/18/2023    GFR >60 04/29/2015    GFRNAA 81 05/18/2022    GFRAA 93 05/18/2022    CA 9.5 12/18/2023     (L) 12/18/2023    K 4.0 12/18/2023    CL 99 12/18/2023    CO2 27.0 12/18/2023     Lab Results   Component Value Date    PTP 12.0 05/28/2020    INR 0.90 05/28/2020       IMAGING:  US KIDNEY/BLADDER (CPT=76770)    Result Date: 11/9/2024  PROCEDURE: US KIDNEY/BLADDER (CPT=76770)  COMPARISON: [facility], US KIDNEY/BLADDER (TMY=[id number]), 12/18/2023, 7:34 PM.  INDICATIONS: Recurrent UTI  TECHNIQUE: Ultrasound examination was performed to visualize the kidneys and bladder.   FINDINGS:   RIGHT KIDNEY:  Right kidney length is 10.8 cm.   No hydroureteronephrosis or obstructing renal stones.  There is a hypoechoic mass of the medial aspect of the right kidney with peripheral vascularity which measures 2.1 x 2.6 x 2.2 cm.  There appears to be increased through transmission.   LEFT KIDNEY: Left kidney length is 11.8 cm.   No hydroureteronephrosis or obstructing renal stones.  No renal lesion.  BLADDER:  Moderately distended.  Volume of 191 mL. No bladder stones.  Bilateral bladder jets demonstrated.  CONCLUSION:   2.6 cm hypoechoic mass of the medial right kidney.  While this could represent a complex cystic renal lesion with internal proteinaceous or hemorrhagic components, underlying solid renal mass would be the differential concern.  Recommend further evaluation with renal protocol MRI or CT.  No hydronephrosis or obstructing renal stones.       DICTATED BY (CST): RADHA LUNSFORD MD ON 11/09/2024 AT 12:49 PM     FINALIZED BY (CST): RADHA LUNSFORD MD ON 11/09/2024 AT 12:51 PM               IMPRESSION:  Right Renal Mass  OAB  Recurrent UTI    PLAN:  - Conservative management  - Begin Estrace Cream  - UA with Reflex culture today  - Standing order for Urine  Culture x 3  - CT Abd/Pelvis  - Follow-up after CT    Kami Galloway, APRN  11/15/2024

## 2024-11-15 NOTE — PATIENT INSTRUCTIONS
UTI prevention with continuing good hydration, starting a women's probiotic (bottle should say women's, vaginal, genitourinary; main ingredient should be lactobacillus), Cranberry pills (Ellura, Utiva, Crancap -all are found on [employer] on their respective website; they should have 36 mg PAC), bowel regimen (colace, senna, miralax), voiding before and after sexual activity and using pH balanced soaps.   Start using vaginal estrace cream every night for 1 week followed by every other night indefinitely.   Behavioral management includes timed voiding (going to the bathroom on a schedule every 1-4 hours), double voiding (trying to pee twice), avoiding bladder irritants (coffee, tea, soda, pop, alcohol, spicy and acidic foods), compression stockings, elevation of feet, voiding prior to bedtime, avoiding fluids 3 to 4 hours before bedtime and constipation management.     Call [phone] to schedule your CT. Follow-up after the CT.   I've placed a standing order for a urine culture x3. Drop off a sample at any [place] lab if you feel a UTI coming on.

## 2024-11-19 NOTE — TELEPHONE ENCOUNTER
CT Abdomen/Pelvis        Status: DENIED        Reference number [id number]     A copy of the denial letter is filed under the MEDIA tab, reference for complete details. You may reach out to [facility] at [phone] to discuss decision.     Please reach out to patient with plan of care.      Thank you    Reason   Your doctor told us that you may have a kidney mass (growth). An imaging study was  asked for. We cannot approve this request because:  Picture studies of more than one body region are not needed to show your doctor the  details needed to treat you. A picture study of one body region is sufficient.  A different study (99105-Eliwimta Tomography (CT), a special kind of picture of your  stomach area without and with contrast (dye)) is likely to show your doctor all of the  details they need to see in order to treat you. This study will be approved if requested.

## 2024-11-19 NOTE — TELEPHONE ENCOUNTER
Called and spoke with [employer]. Provided additional details as to the reason the test was ordered. They are going to send it for reconsideration.

## 2024-11-20 NOTE — TELEPHONE ENCOUNTER
I called [employer]atri and the Reconsideration is still in a denial. You still have until 11/25 to do a peer to peer.  Gayatri at [phone] Case # [id number]     The Patient is scheduled for 11/23.    Please advise

## 2024-11-21 NOTE — TELEPHONE ENCOUNTER
Received fax from [facility], with denial of CT abdomen and pelvis with and without contrast. CPT: 37109    Per message, \"The type of picture (studies supported is listed in the guideline. A different study (42968- CT Abdomen with and without contrast is likely to show your doctor all the details they need to see in order to treat you. This study will be approved if requested.\"    Please advise or can proceed with peer to peer by calling number below.

## 2024-12-19 NOTE — TELEPHONE ENCOUNTER
Requested Prescriptions     Pending Prescriptions Disp Refills    cyclobenzaprine 5 MG Oral Tab 60 tablet 0     Sig: Take 1 tablet (5 mg total) by mouth 2 (two) times daily as needed for Muscle spasms.     LOV: 11/6/24  Future Appointments   Date Time Provider Department Center   12/26/2024  3:40 PM [facility]   1/22/2025  4:00 PM Cyril Betts MD [facility] Replaced by [facility]   2/19/2025  3:40 PM Cass Gates MD [facility]     Labs:       All notes  Instructions    You were seen today for joint and muscle pain  You have the positive KEILA some swelling on ultrasound in your left middle finger, intermittent rashes  There may be a component undifferentiated connective tissue disease  I also think your symptoms are related to fibromyalgia  Try low-dose hydroxychloroquine 200 mg daily which will be 1 pill a day

## 2025-01-22 NOTE — PROGRESS NOTES
Jewels Almanzar is a 59 year old female.    HPI:     Chief Complaint   Patient presents with    Follow - Up     For GI Issues, Left side abdominal pain     The patient is a 59-year-old female who has a history of prior cholecystectomy, COVID-19, hysterectomy, reflux, fibromyalgia, sleep apnea who follows up in the office today.    Patient reports that she has had COVID x 3 episodes.  She had diarrhea after each episode.  She has also described bloating and left-sided abdominal discomfort.    Patient occasionally describes darker stools/black stools.  She feels inflammation on the left side.  She does drink coffee and 1 Diet Coke per day.  Otherwise eating healthy.      HISTORY:  Past Medical History:    Allergic rhinitis    Anxiety state, unspecified    Bowel habit changes    colonoscopy     Cholelithiasis    cholecystectomy     Constipation    COVID-19    Diabetes (HCC)    Disorder of thyroid    Endometriosis    Endometriosis/bleeding, Total Hysterectomy    Esophageal reflux    Fibromyalgia    High blood pressure    Hyperthyroidism    Hypothyroidism    Lipid screening    per NG    Obesity, unspecified    TMJ (temporomandibular joint disorder)    \"TMJ\"    Tonsillitis    tonsillectomy     Unspecified sleep apnea    no cpap use      Past Surgical History:   Procedure Laterality Date    Appendectomy      Appendectomy  1994    Cholecystectomy      Colonoscopy  2010    per NG    Colonoscopy  2014    Colonoscopy N/A 2021    Procedure: COLONOSCOPY;  Surgeon: Cyril Betts MD;  Location: [facility] ENDOSCOPY    Hysterectomy      total    Hysterectomy      Needle biopsy left      Benign      ,    Other surgical history      endiometrosis    Tonsillectomy      Tonsillectomy      Total abdom hysterectomy        Family History   Problem Relation Age of Onset    Heart Attack Mother         MI    Heart Disorder Mother     Hypertension Mother     Heart Attack Father 75         MI    Diabetes Father     Hypertension Father     Lipids Father     Other (Other) Father         Rheumatoid Arthritis    Breast Cancer Sister 38    Glaucoma Sister         patient not sure but thinks she has glc    Cancer Sister         Renal cell carcinoma    Cancer Sister         cervical and breast cancer    Diabetes Sister     Diabetes Sister     Obesity Sister     Obesity Sister     Obesity Sister     Cancer Brother 49        Renal cell carcinoma    Cancer Brother     Cancer Maternal Grandmother         Cervical cancer    Ovarian Cancer Maternal Grandmother 60    Heart Attack Other         MI AT 45/MI AT 48 AFTER CHEMO, SISTERS X 2     Prostate Cancer Neg     Pancreatic Cancer Neg       Social History:   Social History     Socioeconomic History    Marital status:    Tobacco Use    Smoking status: Never    Smokeless tobacco: Never   Vaping Use    Vaping status: Never Used   Substance and Sexual Activity    Alcohol use: Never    Drug use: No   Other Topics Concern    Caffeine Concern Yes     Comment: 12 oz coffee per day, and 1 can diet Cola per day.    Exercise No   Social History Narrative    The patient does not use an assistive device..  Soft cervical collar at times when sitting at computer or craft work.    The patient does live in a home with stairs.        Medications (Active prior to today's visit):  Current Outpatient Medications   Medication Sig Dispense Refill    cyclobenzaprine 5 MG Oral Tab Take 1 tablet (5 mg total) by mouth 2 (two) times daily as needed for Muscle spasms. 60 tablet 0    estradiol (ESTRACE) 0.1 MG/GM Vaginal Cream Apply fingertip amount of cream to the vagina every night for 1 week and then every other night indefinitely 42.5 g 5    hydroxychloroquine 200 MG Oral Tab Take 1 tablet (200 mg total) by mouth daily. 180 tablet 0    valsartan 80 MG Oral Tab Take 1 tablet (80 mg total) by mouth daily. 90 tablet 1    DICYCLOMINE 10 MG Oral Cap TAKE 1 CAPSULE BY MOUTH THREE  TIMES DAILY AS NEEDED 30 capsule 0    Glucose Blood (ONETOUCH ULTRA) In Vitro Strip 1 each by Other route daily. 100 strip 1    butalbital-acetaminophen-caffeine -40 MG Oral Tab TAKE 1 TABLET BY MOUTH EVERY 6 HOURS AS NEEDED FOR PAIN 30 tablet 1    empagliflozin (JARDIANCE) 10 MG Oral Tab Take 1 tablet (10 mg total) by mouth daily. 90 tablet 1    JANUVIA 100 MG Oral Tab TAKE 1 TABLET(100 MG) BY MOUTH DAILY 90 tablet 1    LEVOTHYROXINE 125 MCG Oral Tab TAKE 1 TABLET(125 MCG) BY MOUTH BEFORE BREAKFAST 90 tablet 1    HYDROcodone-acetaminophen 5-325 MG Oral Tab Take 1 tablet by mouth 2 (two) times daily as needed for Pain. 45 tablet 0    clonazePAM 0.5 MG Oral Tab Take 1 tablet (0.5 mg total) by mouth nightly as needed (insomnia). 30 tablet 3    ERGOCALCIFEROL 1.25 MG (26208 UT) Oral Cap TAKE 1 CAPSULE BY MOUTH 1 TIME A WEEK 12 capsule 0    Lancets (ONETOUCH DELICA PLUS [id number]) Does not apply Misc 1 lancet by Finger stick route daily. 100 each 3    Clobetasol Propionate 0.05 % External Ointment       lidocaine-menthol 4-1 % External Patch Place 1 patch onto the skin daily as needed. 7 patch 0    Fluticasone Propionate 50 MCG/ACT Nasal Suspension 1 PUFF EACH NOSTRIL 2 TIMES A  DAY 1 Bottle 3    Cranberry 250 MG Oral Tab Take 2 tablets by mouth daily as needed.      metoprolol succinate ER 25 MG Oral Tablet 24 Hr TAKE 1 TABLET BY MOUTH DAILY AT BEDTIME (Patient not taking: Reported on 1/22/2025) 90 tablet 1    omeprazole 20 MG Oral Capsule Delayed Release Take 1 capsule (20 mg total) by mouth every morning. (Patient not taking: Reported on 1/22/2025) 90 capsule 1    Insulin Pen Needle (PEN NEEDLES) 32G X 4 MM Does not apply Misc 1 each by Does not apply route every 7 days. (Patient not taking: Reported on 1/22/2025) 30 each 2       Allergies:  Allergies[1]      ROS:   The patient denies any chest pain or shortness of breath,  No neurologic or dermatologic symptoms.     PHYSICAL EXAM:   Blood pressure 124/81,  pulse 77, height 5' 2\" (1.575 m), weight 189 lb (85.7 kg), not currently breastfeeding.    The patient appears their stated age and is in no acute distress  HEENT- anicteric sclera, neck no lymphadnopathy, OP- clear with no masses or lesions  Chest- Clear bilaterally, no wheezing,  Heart- regular rate, no murmur or gallop  Abdomen- Soft and nontender, nondistended  Ext- no clubbing or cyanosis  Skin- no rashes or lesions  Neuro- appropriate response, alert, no confusion  .  ASSESSMENT/PLAN:   Assessment     Left-sided abdominal pain  Change in stool/black stools    The possibility of inflammation, gastritis, ulcer, diverticular disease or lower symptoms such as colitis.  Discussed further workup and evaluation with colonoscopy and EGD with sedation and Suprep.  Risks and benefits discussed and the patient agrees.  Patient will restart some Florastor for gas symptoms okay to take twice a day for 1 month.  Other possibilities would be for scar tissue or adhesive disease contributing to some of her symptoms.    Plan  Left sided pain   Black stools   - Colonoscopy and EGD with MAC and suprep  - restart on dicyclomine as needed  - bland diet  - Florastor probiotic for gas - take twice a day for 1 month  - discussed possible adhesions, scar tissue on inside of abdomen     Orders This Visit:  No orders of the defined types were placed in this encounter.      Meds This Visit:  Requested Prescriptions      No prescriptions requested or ordered in this encounter       Imaging & Referrals:  None       Cyril Betts MD  [facility]              [1]   Allergies  Allergen Reactions    Morphine PAIN     migraine    Prochlorperazine JITTERY     Compazine    Prochlorperazine Edisylate JITTERY    Sumatriptan FATIGUE     Left side weakness  Left side weakness  Other reaction(s): weakness

## 2025-01-22 NOTE — TELEPHONE ENCOUNTER
Patient was seen in office today with Dr. Betts and was given orders to schedule. Please call patient to schedule his/her procedure with the orders given below. Patient was given the phone number and prep instructions at the time of the office visit. The prep instructions was also explained to the patient at the time of the visit. The patient verbalized understanding the prep and that a GI  will call him/her for the procedure.  If patient calls before the 1 week turnaround please schedule with the orders given below, thank you!    Orders from Dr. Betts      Instructions    Left sided pain   Black stools   - Colonoscopy and EGD with MAC and suprep  - restart on dicyclomine as needed  - bland diet  - Florastor probiotic for gas - take twice a day for 1 month  - discussed possible adhesions, scar tissue on inside of abdomen

## 2025-01-22 NOTE — PATIENT INSTRUCTIONS
Left sided pain   Black stools   - Colonoscopy and EGD with MAC and suprep  - restart on dicyclomine as needed  - bland diet  - Florastor probiotic for gas - take twice a day for 1 month  - discussed possible adhesions, scar tissue on inside of abdomen

## 2025-01-23 NOTE — TELEPHONE ENCOUNTER
Current Outpatient Medications   Medication Sig Dispense Refill    Na Sulfate-K Sulfate-Mg Sulf (SUPREP BOWEL PREP KIT) 17.5-3.13-1.6 GM/177ML Oral Solution Take as directed 1 each 0       Med not covered. Call plan to initiate PA.

## 2025-01-24 NOTE — TELEPHONE ENCOUNTER
Scheduled for: Colonoscopy 04476/EGD 51048   Provider Name: Dr. Betts   Date: Tues 4/29/2025   Location: [facility]   Sedation: MAC   Time: 1:10 pm, (pt is aware that Endo will call the day before to confirm arrival time)  Prep: split suprep   Meds/Allergies Reconciled?: reviewed by provider    Diagnosis with codes: black stools K92.1, left sided pain R10.9  Was patient informed to call insurance with codes (Y/N): Yes   Referral sent?:  Yes, Medicaid  [facility] or [facility] notified?: I sent an electronic request to Endo Scheduling and received a confirmation today.  Medication Orders:     Patient is aware to NOT take iron pills, herbal meds and diet supplements for 7 days before exam. Also to NOT take any form of alcohol, recreational drugs and any forms of ED meds 24-72 hours before exam.        Misc Orders:       Further instructions given by staff: Instructions given in office and pt verbalized understanding

## 2025-01-24 NOTE — TELEPHONE ENCOUNTER
Dr. Betts    Patient is scheduled for colon/EGD on 4/29/25 @ [facility].  Patient is on diabetic meds and a stimulant, please advice on medication orders, prior to sending MyChart instructions.    Thank you

## 2025-01-28 NOTE — TELEPHONE ENCOUNTER
Sending [employer] message.    Scheduled for: Colonoscopy 79712; EGD 64731   Provider Name: Dr. Betts   Date: Tues 4/29/2025   Location: [facility]   Sedation: MAC   Time: 1:10 PM (pt is aware that Endo will call the day before to confirm arrival time)  Prep: split suprep   Meds/Allergies Reconciled?: reviewed by provider    Diagnosis with codes: black stools K92.1, left sided pain R10.9  Was patient informed to call insurance with codes (Y/N): Yes, I confirmed MEDICAID REPLACEMENT insurance with the patient.  Referral sent?:  Referral was sent at the time of electronic surgical scheduling.   [facility] or [facility] notified?: I sent an electronic request to Endo Scheduling and received a confirmation today.  Medication Orders:  Hold Januvia 4 days prior to the procedure. Hold amphetamine 7 days prior to the procedure.  Patient is aware to NOT take iron pills, herbal meds and diet supplements for 7 days before exam. Also to NOT take any form of alcohol, recreational drugs and any forms of ED meds 24-72 hours before exam.   Misc Orders:  N/A  Further instructions given by staff: I discussed the prep instructions with the patient which she verbally understood and is aware that I will send the instructions today.

## 2025-01-29 NOTE — TELEPHONE ENCOUNTER
Fax received from [facility].  Suprep was denied - reason given: she has to try and fail Gavilyte-G and Gavilyte-N     I spoke to pharmacy technician Torres  I let him know Suprep was denied and wanted to see if could apply coupon or order gives ok to substitute Golytely which is covered so could fill that instead.  He transferred me to the pharmacist  The pharmacist will apply coupon that makes it $34.54

## 2025-01-29 NOTE — PATIENT INSTRUCTIONS
Discussed recommendations with patient. She confirms understanding. Will switch remeron to night and will try otc for symptoms. No other questions or concerns   You were seen for worsening joint pain  Try flexeril 1-2 times a day as needed  Get blood work and xray today  Get us hands  Make a follow up

## 2025-02-19 NOTE — TELEPHONE ENCOUNTER
Jewels Almanzar was scheduled for an appt on 02/19/25 with a visit reason of follow up.    Appointment was a No Show.  Unable to reach patient. - left msg to r/s    Clinical Staff: Please notify provider if there were labs or x-rays being held for review at this missed visit.   Routing:      Acute reason:                   PPD route to triage nurse                    EHV route to clinical pool     Non acute reason:                    EHV/PPD route to site clinical pool

## 2025-03-29 NOTE — DISCHARGE INSTRUCTIONS
At this time no fracture was seen.  You could have a hidden fracture in your kneecap or even a tendon or ligament injury.  This is why you need to follow-up with orthopedics for further evaluation and possible advanced imaging such as MRI.  Keep the knee immobilizer on until you follow-up.  You may sleep without it however.  Use the crutches and do not put weight on your leg.  Ibuprofen as needed for pain.  Use the Norco if needed but do not mix this with other pain medications or muscle relaxers.  Read all instructions.  Return for changes or worsening.

## 2025-03-29 NOTE — ED PROVIDER NOTES
Patient Seen in: [facility] Emergency Department      History     Chief Complaint   Patient presents with    Fall     Stated Complaint: fall    Subjective:   59-year-old female presents with left lower extremity injury that occurred yesterday.  She had a mechanical fall landed on her left knee.  She felt instant pain in her anterior knee, left great toe and left foot near the first metatarsal.  Also feels some pain in her ankle.  She has been able to bear weight but it causes pain.  Her great toe feels cold.  No head or neck injury.  No other complaints              Objective:     Past Medical History:    Allergic rhinitis    Anxiety state, unspecified    Bowel habit changes    colonoscopy     Cholelithiasis    cholecystectomy     Constipation    COVID-19    Diabetes (HCC)    Disorder of thyroid    Endometriosis    Endometriosis/bleeding, Total Hysterectomy    Esophageal reflux    Fibromyalgia    High blood pressure    Hyperthyroidism    Hypothyroidism    Lipid screening    per     Obesity, unspecified    TMJ (temporomandibular joint disorder)    \"TMJ\"    Tonsillitis    tonsillectomy     Unspecified sleep apnea    no cpap use              Past Surgical History:   Procedure Laterality Date    Appendectomy      Appendectomy      Cholecystectomy      Colonoscopy  2010    per     Colonoscopy  2014    Colonoscopy N/A 2021    Procedure: COLONOSCOPY;  Surgeon: Cyril Betts MD;  Location: [facility] ENDOSCOPY    Hysterectomy      total    Hysterectomy      Needle biopsy left      Benign      1992,93    Other surgical history      endiometrosis    Tonsillectomy      Tonsillectomy      Total abdom hysterectomy                  Social History     Socioeconomic History    Marital status:    Tobacco Use    Smoking status: Never    Smokeless tobacco: Never   Vaping Use    Vaping status: Never Used   Substance and Sexual Activity    Alcohol use: Never     Drug use: No   Other Topics Concern    Caffeine Concern Yes     Comment: 12 oz coffee per day, and 1 can diet Cola per day.    Exercise No   Social History Narrative    The patient does not use an assistive device..  Soft cervical collar at times when sitting at computer or craft work.    The patient does live in a home with stairs.                  Physical Exam     ED Triage Vitals [03/29/25 1355]   /76   Pulse 88   Resp 18   Temp 98.1 °F (36.7 °C)   Temp src Oral   SpO2 98 %   O2 Device None (Room air)       Current Vitals:   Vital Signs  BP: 135/76  Pulse: 88  Resp: 18  Temp: 98.1 °F (36.7 °C)  Temp src: Oral    Oxygen Therapy  SpO2: 98 %  O2 Device: None (Room air)        Physical Exam  HENT:      Head: Normocephalic.      Left Ear: External ear normal.      Nose: Nose normal.      Mouth/Throat:      Mouth: Mucous membranes are moist.   Eyes:      Extraocular Movements: Extraocular movements intact.   Cardiovascular:      Rate and Rhythm: Regular rhythm. Tachycardia present.      Pulses: Normal pulses.   Pulmonary:      Effort: Pulmonary effort is normal.   Abdominal:      Palpations: Abdomen is soft.      Tenderness: There is no abdominal tenderness.   Musculoskeletal:         General: No swelling.      Cervical back: Normal range of motion.      Comments: Swelling left anterior knee with some bruising.  Tenderness to the patella.  No laxity.  Able to hold in full extension off the bed.  No obvious tendon rupture.  Foot is tender in the first metatarsal.  Also tender in the proximal phalanx of the great toe.  Good cap refill.  Warm foot, strong pedal pulse.  Ankle mildly tender medially but able to plantarflex   Skin:     General: Skin is warm.      Capillary Refill: Capillary refill takes less than 2 seconds.   Neurological:      Mental Status: She is alert.      Sensory: No sensory deficit.      Motor: No weakness.             ED Course   Labs Reviewed - No data to display    ED Course as of  03/30/25 1738  ------------------------------------------------------------  Time: 03/29 1624  Comment: X-rays of the ankle knee and foot on the left independently interpreted by me.  No acute fractures.  I told patient and her family to miss occult patellar fractures as well as ligamentous and tendon injuries can be missed.  I offered a CT but she is comfortable              MDM      XR ANKLE (MIN 3 VIEWS), LEFT (CPT=73610)    Result Date: 3/29/2025  CONCLUSION:  1. Soft tissue swelling.  Otherwise no acute finding.    Dictated by (CST): Heriberto Vance MD on 3/29/2025 at 3:56 PM     Finalized by (CST): Heriberto Vance MD on 3/29/2025 at 3:56 PM          XR KNEE (1 OR 2 VIEWS), LEFT (CPT=73560)    Result Date: 3/29/2025  CONCLUSION:  1. No acute finding.    Dictated by (CST): Heriberto Vance MD on 3/29/2025 at 3:55 PM     Finalized by (CST): Heriberto Vance MD on 3/29/2025 at 3:55 PM          XR FOOT, COMPLETE (MIN 3 VIEWS), LEFT (CPT=73630)    Result Date: 3/29/2025  CONCLUSION:  1. No acute fracture or subluxation.    Dictated by (CST): Heriberto Vance MD on 3/29/2025 at 3:53 PM     Finalized by (CST): Heriberto Vance MD on 3/29/2025 at 3:54 PM                Medical Decision Making      Disposition and Plan     Clinical Impression:  1. Contusion of left knee, initial encounter    2. Sprain of left foot, initial encounter    3. Mild sprain of left ankle, initial encounter         Disposition:  Discharge  3/29/2025  4:46 pm    Follow-up:  Behery, Omar Atef, MD  [address] [place] [id number]  [phone]    Follow up            Medications Prescribed:  Discharge Medication List as of 3/29/2025  4:56 PM        START taking these medications    Details   HYDROcodone-acetaminophen 5-325 MG Oral Tab Take 1-2 tablets by mouth every 6 (six) hours as needed for Pain., Normal, Disp-10 tablet, R-0                 Supplementary Documentation:

## 2025-03-29 NOTE — ED QUICK NOTES
Discharge instructions given to pt and family. Pt and family verbalized understanding of home care, medication use, and to follow up with ortho referral provided. Pt denied further questions or concerns. Pt discharged to lobby via wheelchair with family, pt discharged in stable condition.

## 2025-04-29 NOTE — ANESTHESIA POSTPROCEDURE EVALUATION
Patient: Jewels Almanzar    Procedure Summary       Date: 04/29/25 Room / Location: [facility] ENDOSCOPY 01 / [facility] ENDO    Anesthesia Start: 1059 Anesthesia Stop: 1137    Procedures:       COLONOSCOPY with polypectomy      ESOPHAGOGASTRODUODENOSCOPY (EGD) Diagnosis:       Black stools      Left sided abdominal pain      (Polyps, hemorrhoids, gastritis)    Surgeons: Cyril Betts MD Anesthesiologist: Ridge Cunningham MD    Anesthesia Type: MAC ASA Status: 3            Anesthesia Type: MAC    Vitals Value Taken Time   /70 04/29/25 12:10   Temp  04/29/25 14:10   Pulse 56 04/29/25 12:11   Resp 7 04/29/25 12:11   SpO2 99 % 04/29/25 12:11   Vitals shown include unfiled device data.    [facility] AN Post Evaluation:   Patient Evaluated in PACU  Patient Participation: complete - patient participated  Level of Consciousness: awake and alert  Pain Score: 0  Pain Management: adequate  Airway Patency:patent  Dental exam unchanged from preop  Yes    Nausea/Vomiting: none  Cardiovascular Status: acceptable  Respiratory Status: acceptable  Postoperative Hydration acceptable      Ridge Cunningham MD  4/29/2025 2:10 PM

## 2025-04-29 NOTE — H&P
History & Physical Examination    Patient Name: Jewels Almanzar  MRN: [id number]  CSN: [id number]  YOB: 1965    Diagnosis:   See chart      Prescriptions Prior to Admission[1]  Current Hospital Medications[2]    Allergies: Allergies[3]    Past Medical History[4]  Past Surgical History[5]  Family History[6]  Social History     Tobacco Use    Smoking status: Never    Smokeless tobacco: Never   Substance Use Topics    Alcohol use: Never       SYSTEM Check if Review is Normal Check if Physical Exam is Normal If not normal, please explain:   HEENT [x ] [ x]    NECK & BACK [x ] [x ]    HEART [x ] [ x]    LUNGS [x ] [ x]    ABDOMEN [x ] [x ]    UROGENITAL [ ] [ ]    EXTREMITIES [x ] [x ]    OTHER        [ x ] I have discussed the risks and benefits and alternatives with the patient/family.  They understand and agree to proceed with plan of care.  [ x ] I have reviewed the History and Physical done within the last 30 days.  Any changes noted above.    Cyril Betts MD  4/29/2025  4:13 PM         [1]   No medications prior to admission.   [2]   No current facility-administered medications for this encounter.   [3]   Allergies  Allergen Reactions    Morphine PAIN     migraine    Prochlorperazine JITTERY     Compazine    Prochlorperazine Edisylate JITTERY    Sumatriptan FATIGUE     Left side weakness  Left side weakness  Other reaction(s): weakness   [4]   Past Medical History:   Allergic rhinitis    Anxiety state, unspecified    Back problem    Bowel habit changes    colonoscopy 2010    Cholelithiasis    cholecystectomy 2009    Constipation    COVID-19    Depression    due to daily pain    Diabetes (HCC)    Disorder of thyroid    Endometriosis    Endometriosis/bleeding, Total Hysterectomy    Esophageal reflux    Fibromyalgia    High blood pressure    Hyperthyroidism    Hypothyroidism    Lipid screening    per NG    Migraines    Muscle weakness    Obesity, unspecified    TMJ (temporomandibular joint  disorder)    \"TMJ\"    Tonsillitis    tonsillectomy     Unspecified sleep apnea    no cpap use    Visual impairment    glassses   [5]   Past Surgical History:  Procedure Laterality Date    Appendectomy  1996    Appendectomy      Cholecystectomy      Colonoscopy  2010    per NG    Colonoscopy  2014    Colonoscopy N/A 2021    Procedure: COLONOSCOPY;  Surgeon: Cyril Betts MD;  Location: [facility] ENDOSCOPY    Hysterectomy      total    Hysterectomy      Needle biopsy left      Benign      ,93    Other surgical history      endiometrosis    Tonsillectomy      Tonsillectomy      Total abdom hysterectomy     [6]   Family History  Problem Relation Age of Onset    Heart Attack Mother         MI    Heart Disorder Mother     Hypertension Mother     Heart Attack Father 75        MI    Diabetes Father     Hypertension Father     Lipids Father     Other (Other) Father         Rheumatoid Arthritis    Breast Cancer Sister 38    Glaucoma Sister         patient not sure but thinks she has glc    Cancer Sister         Renal cell carcinoma    Cancer Sister         cervical and breast cancer    Diabetes Sister     Diabetes Sister     Obesity Sister     Obesity Sister     Obesity Sister     Cancer Brother 49        Renal cell carcinoma    Cancer Brother     Cancer Maternal Grandmother         Cervical cancer    Ovarian Cancer Maternal Grandmother 60    Heart Attack Other         MI AT 45/MI AT 48 AFTER CHEMO, SISTERS X 2     Prostate Cancer Neg     Pancreatic Cancer Neg

## 2025-04-29 NOTE — ANESTHESIA PREPROCEDURE EVALUATION
Anesthesia PreOp Note    HPI:     Jewels Almanzar is a 59 year old female who presents for preoperative consultation requested by: Cyril Betts MD    Date of Surgery: 4/29/2025    Procedure(s):  COLONOSCOPY / ESOPHAGOGASTRODUODENOSCOPY  ESOPHAGOGASTRODUODENOSCOPY (EGD)  Indication: Black stools / Left sided abdominal pain    Relevant Problems   No relevant active problems       NPO:                         History Review:  Patient Active Problem List    Diagnosis Date Noted    Concussion with loss of consciousness 01/03/2024    Paresthesias 12/09/2022    Tension headache 12/09/2022    Functional neurological symptom disorder with mixed symptoms     Complicated migraine     Dizziness 12/08/2022    Azotemia 12/08/2022    Post-COVID syndrome 08/23/2022    COVID-19 vaccine series contraindicated 03/02/2022    Cognitive change     PMR (polymyalgia rheumatica) (HCC) 12/02/2021    POTS (postural orthostatic tachycardia syndrome) 12/02/2021    RAMOS (dyspnea on exertion) 10/14/2021    Left upper quadrant abdominal pain 10/14/2021    Post-COVID-19 syndrome manifesting as chronic concentration deficit 09/16/2021    Inflammatory bowel disease 06/01/2021    Pancolitis (HCC) 04/09/2021    Notalgia paresthetica 04/09/2021    Medial epicondylitis of elbow, right 09/25/2020    Cystitis 08/27/2020    Polyarthralgia 08/04/2020    Attention deficit disorder (ADD) without hyperactivity 08/04/2020    Telogen effluvium 08/04/2020    Left-sided weakness 05/28/2020    History of 2019 novel coronavirus disease (COVID-19) 05/07/2020    Acute midline thoracic back pain 04/05/2019    Abnormal radiographic examination 04/05/2019    Acute recurrent maxillary sinusitis 02/28/2019    TMJ arthritis 02/28/2019    Acute superficial gastritis without hemorrhage 05/10/2018    Routine physical examination 05/10/2018    Essential hypertension 04/12/2018    Vitreous floater, bilateral 03/26/2018    Instability of right knee joint 06/27/2017     Recurrent UTI 03/03/2017    Type 2 diabetes mellitus without complication, without long-term current use of insulin (HCC) 01/24/2017    Osteoarthritis, knee 01/24/2017    Hyperopia with astigmatism and presbyopia 08/17/2016    Herpes zoster infection of lumbar region 02/17/2016    Constipation 12/09/2015    Sleep apnea 12/09/2015    Vitamin B12 deficiency 10/07/2015    Thoracic radiculopathy 04/22/2015    Bruxism 04/22/2015    Vaginitis 02/16/2015    Fibromyalgia 01/06/2015    Vitamin D deficiency 10/02/2011    Anxiety state 11/02/2010    Calculus of gallbladder with acute cholecystitis 03/03/2010    Anxiety and depression 07/31/2009    Chronic pain syndrome 07/03/2009    Hypothyroidism 04/20/2009    Enthesopathy of hip region 04/20/2009    Endometriosis 10/12/2008    Major depressive disorder, single episode, moderate (HCC) 09/23/2008    Obesity 09/23/2008    Diverticulitis of colon 09/22/2008       Past Medical History[1]    Past Surgical History[2]    Prescriptions Prior to Admission[3]  Current Medications and Prescriptions Ordered in Epic[4]    Allergies[5]    Family History[6]  Social Hx on file[7]    Available pre-op labs reviewed.             Vital Signs:  Body mass index is 34.39 kg/m².   height is 1.575 m (5' 2\") and weight is 85.3 kg (188 lb).   Vitals:    04/22/25 1541   Weight: 85.3 kg (188 lb)   Height: 1.575 m (5' 2\")        Anesthesia Evaluation     Patient summary reviewed and Nursing notes reviewed    Airway   Mallampati: III  TM distance: <3 FB  Neck ROM: limited  Dental      Pulmonary     breath sounds clear to auscultation  (+) shortness of breath, sleep apnea  Cardiovascular   Exercise tolerance: poor  (+) hypertension, peripheral edema    Rhythm: regular  Rate: normal    Neuro/Psych    (+)  neuromuscular disease (Fibromyalgia), headaches, anxiety/panic attacks,        GI/Hepatic/Renal    (+) GERD    Endo/Other    (+) diabetes mellitus, hypothyroidism    Comments: Obesity  Abdominal                   Anesthesia Plan:   ASA:  3  Plan:   MAC  Informed Consent Plan and Risks Discussed With:  Patient and spouse  Discussed plan with:  Attending      I have informed Jewels Almanzar and/or legal guardian or family member of the nature of the anesthetic plan, benefits, risks including possible dental damage if relevant, major complications, and any alternative forms of anesthetic management.   All of the patient's questions were answered to the best of my ability. The patient desires the anesthetic management as planned.  Ridge Cunningham MD  2025 10:03 AM  Present on Admission:  **None**           [1]   Past Medical History:   Allergic rhinitis    Anxiety state, unspecified    Back problem    Bowel habit changes    colonoscopy     Cholelithiasis    cholecystectomy 2009    Constipation    COVID-19    Depression    due to daily pain    Diabetes (HCC)    Disorder of thyroid    Endometriosis    Endometriosis/bleeding, Total Hysterectomy    Esophageal reflux    Fibromyalgia    High blood pressure    Hyperthyroidism    Hypothyroidism    Lipid screening    per NG    Migraines    Muscle weakness    Obesity, unspecified    TMJ (temporomandibular joint disorder)    \"TMJ\"    Tonsillitis    tonsillectomy     Unspecified sleep apnea    no cpap use    Visual impairment    glassses   [2]   Past Surgical History:  Procedure Laterality Date    Appendectomy      Appendectomy      Cholecystectomy      Colonoscopy  2010    per NG    Colonoscopy  2014    Colonoscopy N/A 2021    Procedure: COLONOSCOPY;  Surgeon: Cyril Betts MD;  Location: [facility] ENDOSCOPY    Hysterectomy      total    Hysterectomy      Needle biopsy left      Benign      ,93    Other surgical history      endiometrosis    Tonsillectomy      Tonsillectomy      Total abdom hysterectomy     [3]   Medications Prior to Admission   Medication Sig Dispense Refill Last Dose/Taking    PROBIOTIC PRODUCT OR Take  1 tablet by mouth daily.   Taking    JARDIANCE 25 MG Oral Tab Take 1 tablet (25 mg total) by mouth in the morning.   Taking    tiZANidine 2 MG Oral Tab Take 1 tablet (2 mg total) by mouth in the morning and 1 tablet (2 mg total) in the evening and 1 tablet (2 mg total) before bedtime.   Taking    [] Amphetamine-Dextroamphet ER 20 MG Oral Capsule SR 24 Hr 1 capsule (20 mg total).   Taking    dicyclomine 10 MG Oral Cap Take 1 capsule (10 mg total) by mouth 3 (three) times daily before meals as needed. 90 capsule 0 Taking As Needed    estradiol (ESTRACE) 0.1 MG/GM Vaginal Cream Apply fingertip amount of cream to the vagina every night for 1 week and then every other night indefinitely 42.5 g 5 Taking    valsartan 80 MG Oral Tab Take 1 tablet (80 mg total) by mouth in the morning. 90 tablet 1 Taking    JANUVIA 100 MG Oral Tab TAKE 1 TABLET(100 MG) BY MOUTH DAILY 90 tablet 1 Taking    LEVOTHYROXINE 125 MCG Oral Tab TAKE 1 TABLET(125 MCG) BY MOUTH BEFORE BREAKFAST 90 tablet 1 Taking    ERGOCALCIFEROL 1.25 MG (34654 UT) Oral Cap TAKE 1 CAPSULE BY MOUTH 1 TIME A WEEK 12 capsule 0 Taking    Clobetasol Propionate 0.05 % External Ointment    Taking    lidocaine-menthol 4-1 % External Patch Place 1 patch onto the skin daily as needed. 7 patch 0 Taking As Needed    Cranberry 250 MG Oral Tab Take 2 tablets by mouth daily as needed.   Taking As Needed    [] HYDROcodone-acetaminophen 5-325 MG Oral Tab Take 1-2 tablets by mouth every 6 (six) hours as needed for Pain. 10 tablet 0     DULoxetine 20 MG Oral Cap DR Particles Take 1 capsule (20 mg total) by mouth in the morning.       Na Sulfate-K Sulfate-Mg Sulf (SUPREP BOWEL PREP KIT) 17.5-3.13-1.6 GM/177ML Oral Solution Take as directed 1 each 0     cyclobenzaprine 5 MG Oral Tab Take 1 tablet (5 mg total) by mouth 2 (two) times daily as needed for Muscle spasms. 60 tablet 0     hydroxychloroquine 200 MG Oral Tab Take 1 tablet (200 mg total) by mouth daily. (Patient  taking differently: Take 0.5 tablets (100 mg total) by mouth in the morning.) 180 tablet 0     Glucose Blood (ONETOUCH ULTRA) In Vitro Strip 1 each by Other route daily. 100 strip 1     clonazePAM 0.5 MG Oral Tab Take 1 tablet (0.5 mg total) by mouth nightly as needed (insomnia). 30 tablet 3     omeprazole 20 MG Oral Capsule Delayed Release Take 1 capsule (20 mg total) by mouth every morning. (Patient not taking: Reported on 1/22/2025) 90 capsule 1     Lancets (ONETOUCH DELICA PLUS [id number]) Does not apply Misc 1 lancet by Finger stick route daily. 100 each 3     Fluticasone Propionate 50 MCG/ACT Nasal Suspension 1 PUFF EACH NOSTRIL 2 TIMES A  DAY 1 Bottle 3    [4]   Current Facility-Administered Medications Ordered in Epic   Medication Dose Route Frequency Provider Last Rate Last Admin    lactated ringers infusion   Intravenous Continuous Cyril Betts MD         No current [facility]-ordered outpatient medications on file.   [5]   Allergies  Allergen Reactions    Morphine PAIN     migraine    Prochlorperazine JITTERY     Compazine    Prochlorperazine Edisylate JITTERY    Sumatriptan FATIGUE     Left side weakness  Left side weakness  Other reaction(s): weakness   [6]   Family History  Problem Relation Age of Onset    Heart Attack Mother         MI    Heart Disorder Mother     Hypertension Mother     Heart Attack Father 75        MI    Diabetes Father     Hypertension Father     Lipids Father     Other (Other) Father         Rheumatoid Arthritis    Breast Cancer Sister 38    Glaucoma Sister         patient not sure but thinks she has glc    Cancer Sister         Renal cell carcinoma    Cancer Sister         cervical and breast cancer    Diabetes Sister     Diabetes Sister     Obesity Sister     Obesity Sister     Obesity Sister     Cancer Brother 49        Renal cell carcinoma    Cancer Brother     Cancer Maternal Grandmother         Cervical cancer    Ovarian Cancer Maternal Grandmother 60    Heart Attack Other          MI AT 45/MI AT 48 AFTER CHEMO, SISTERS X 2     Prostate Cancer Neg     Pancreatic Cancer Neg    [7]   Social History  Socioeconomic History    Marital status:    Tobacco Use    Smoking status: Never    Smokeless tobacco: Never   Vaping Use    Vaping status: Never Used   Substance and Sexual Activity    Alcohol use: Never    Drug use: No   Other Topics Concern    Caffeine Concern Yes     Comment: 12 oz coffee per day, and 1 can diet Cola per day.    Exercise No

## 2025-04-29 NOTE — DISCHARGE INSTRUCTIONS
Home Care Instructions for Colonoscopy and/or Gastroscopy with Sedation    Diet:  - Resume your regular diet as tolerated unless otherwise instructed.  - Start with light meals to minimize bloating.  - Do not drink alcohol today.    Medication:  - If you have questions about resuming your normal medications, please contact your Primary Care Physician.    Activities:  - Take it easy today. Do not return to work today.  - Do not drive today.  - Do not operate any machinery today (including kitchen equipment).  - Do not make any critical decisions or sign any paperwork.  - Do not exercise today.    Colonoscopy:  - You may notice some rectal \"spotting\" (a little blood on the toilet tissue) for a day or two after the exam. This is normal.  - If you experience any rectal bleeding (not spotting), persistent tenderness or sharp severe abdominal pains, oral temperature over 100 degrees Fahrenheit, light-headedness or dizziness, or any other problems, contact your doctor.    Gastroscopy:  - You may have a sore throat for 2-3 days following the exam. This is normal. Gargling with warm salt water (1/2 tsp salt to 1 glass warm water) or using throat lozenges will help.  - If you experience any sharp pain in your neck, abdomen or chest, vomiting of blood, oral temperature over 100 degrees Fahrenheit, light-headedness or dizziness, or any other problems, contact your doctor.    **If unable to reach your doctor, please go to the [facility] Emergency Room**    - Your referring physician will receive a full report of your examination.  - If you do not hear from your doctor's office within two weeks of your biopsy, please call them for your results.    You may be able to see your laboratory results in [employer] between 4 and 7 business days.  In some cases, your physician may not have viewed the results before they are released to [employer].  If you have questions regarding your results contact the physician who ordered the  test/exam by phone or via [employer] by choosing \"Ask a Medical Question.\"

## 2025-04-29 NOTE — OPERATIVE REPORT
[facility] Endoscopy Report  Date of procedure-April 29, 2025    Preoperative Diagnosis:  -Abdominal pain  - Black stool    Postoperative Diagnosis:  - Colon polyps x 2  - Internal hemorrhoids  - Gastritis    Procedure:    Colonoscopy       Surgeon:  Cyril Betts M.D.    Anesthesia:  MAC     Technique:  After informed consent, the patient was placed in the left lateral recumbent position.  Digital rectal examination revealed no palpable intraluminal abnormalities.  An Olympus variable stiffness 190 series HD colonoscope was inserted into the rectum and advanced under direct vision by following the lumen to the cecum.  The colon was examined upon withdrawal in the left lateral position.    Following colonoscopy, an Olympus adult HD gastroscope was inserted into the hypopharynx and advanced under direct vision into the esophagus, stomach and duodenum.  The endoscope was withdrawn to the stomach where retroflexion of the annulus, body, cardia and fundus was performed.  The instrument was straightened, insufflated air and fluid were suctioned and the endoscope was withdrawn.  The procedures were well tolerated without immediate complication.      Findings:  The preparation of the colon was good.  The terminal ileum was examined for 4 cm and visually normal.  The ileocecal valve was well preserved. The visualized colonic mucosa from the cecum to the anal verge was normal with an intact vascular pattern.    Colon polyp x 2 removed as follows;  - Both polyps were diminutive and removed by cold forceps technique.  Both sites were inspected and found to be free of bleeding and specimens retrieved and sent for analysis.    Small internal hemorrhoids noted on retroflexed view.    The esophagus was normal.  The GE junction and diaphragmatic impression were at 40 cm.  The stomach distended appropriately with insufflated air.  The mucosa of the stomach shows gastric erythema, biopsies taken.  The duodenal bulb  and post bulbar regions were normal.    Estimated blood loss-insignificant  Specimens-see above    Impression:  - Colon polyps x 2  - Internal hemorrhoids  - Gastritis    Recommendations:  - Post polypectomy instructions given  - Repeat colonoscopy in 7- 10 years  - Symptomatic treatment of hemorrhoids  - Follow up on upper GI biopsies          Cyril Betts MD  4/29/2025  4:41 PM

## 2025-05-09 NOTE — TELEPHONE ENCOUNTER
Current Outpatient Medications   Medication Sig Dispense Refill    hydroxychloroquine 200 MG Oral Tab Take 1 tablet (200 mg total) by mouth daily. (Patient taking differently: Take 0.5 tablets (100 mg total) by mouth in the morning.) 180 tablet 0

## 2025-05-12 NOTE — TELEPHONE ENCOUNTER
Future Appointments  Future Appointments (Rheumatology)       Visit Type Date Time Department    MYCHART FOLLOW UP SPECIALTY 6/17/2025  5:40 PM [facility] RHEUMATOLGY [facility]            Recent Appointments  Recent Outpatient Visits              11/06/20[facility]    04/16/2024 [facility]    [facility]    02/03/2023 [facility] pain of right [facility]    [facility]    11/02/2022 [facility] pain of left ankle    [facility]    [facility]    07/20/2022 Chronic left-sided low back pain with left-sided sciatica    [facility]            Recent Labs (6 months)  No results found for this or any previous visit (from the past 4380 hours).  No results found for: \"AST\"  No results found for: \"ALT\"  No results found for: \"ESRML\"  No results found for: \"ALB\"  No results found for: \"CREATSERUM\"  No results found for: \"ESRML\"  No results found for: \"CRP\"  No results found for: \"URIC\"  No results found for: \"QFGOLDRESULT\"     2/26/25 CBC and CMP WNL

## 2025-06-03 NOTE — PATIENT INSTRUCTIONS
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

## 2025-06-03 NOTE — PROGRESS NOTES
Jewels Almanzar is a 60 year old female.    HPI:     Chief Complaint   Patient presents with    Undifferentiated Connective Tissue Disease    Medication Follow-Up       I had the pleasure of seeing Jewels Almanzar on 6/3/2025 for follow up +KEILA and polyarthralgia and myalgia     Current Medications:  Tylenol arthritis daily  Tizanidine    mg daily- started 11/2024  Previous Medications:  Prednisone 5 mg daily- started 12/8/2021- Sept 2022  Gabapentin 200 mg bid- June 2020- d/c recently because of s/e couldn't focus  Cymbalta- recently started it but had s/e  Amitriptyline- tried in the past but doesn't remember  Lyrica- tried in the past but didn't work   Tramadol- didn't help   mg daily - stopped due to easy bruising and some visual changes   Blood work:  Neg KEILA, ESR, RF, CCP, HLA-B27  Now +KEILA 1:320, neg ESR , CRP, CBC, CMP (9/2021)  MRI C spine June 2020: normal  MRI Thoracic and Lumbar spine: mild DJD and facet disease  XR R knee 1/2020: mild OA greatest at PF cmpt  MRI R shoulder 2015: Minimal supraspinatus tendinosis, Small amount of fluid in the subacromial bursa. Bursitis?    Interval History:  This is a 54 yo F with hx of HTN, DM-2, Hypothyroid, Anxiety/Depression, Fibromyalgia presents with generalized pain.  She reports pain for many years but over the past 2 months it has worsened.  She has pain in her neck, mid back, shoulders, elbows, L hip, bilateral knees (L worse than R).  She did have COVID in May 2020.  States that her symptoms have worsened since then.  She has been on multiple medications in the past, most recently gabapentin and Cymbalta but both had side effects and did not work therefore she discontinued it.  She also has a lot of muscle pain throughout her body.  She is unable to abduct her left shoulder due to pain.  She also is unable to lay on her left side due to her left hip pain.  She was recently started on Norco last week, helps her pain slightly.  Also taking  Tylenol arthritis, meloxicam and tizanidine.     11/10/2021  Presents for reevaluation of polyarthralgias  She was seen back in October I received left shoulder and trochanteric bursa injections, they helped for about 2 to 3 months  She received her Covid vaccine in September 2021 afterwards developed worsening joint pain and muscle pain.  She would also have fluctuations in her blood pressure where her blood pressure dropped.  She has a lot of pain in her upper back, lower back and legs.  Knees are also very painful  Hands, fingers and wrist are painful.  Reports minimal swelling.  She has been taking hydrocodone twice a day now to help with some of her symptoms.  Blood work in the past showed negative KEILA and most recent blood work shows a positive KEILA now.  CRP was slightly increased to 0.86.  In addition to joint pain she also has a lot of muscle pain throughout her body  At times is hard to put her bra on or take off her shirt.  She does have full range of motion of both shoulders  She was given steroids back in May by her PCP, it did help her joint slightly  She also had Covid last year in 2020 and continues to have shortness of breath.  Also has memory issues    12/8/2021:   Presents for follow-up of worsening joint pain and muscle pain after having Covid in September 2021  Reports a lot of pain in the upper back, neck, lower back and legs.  Last month injected her left trochanteric bursa with cortisone, helped for a few days  Blood work showed negative FEDE panel.  RF and CCP were negative.  ESR was normal.  CRP was slightly elevated at 0.83  She was started on prednisone 20 mg daily for 2 weeks, felt 80% better in the shoulders and hips  Was able to move a lot better. Able to go down and upstairs with alternating legs but before was only able to go down 1 leg at a time  Was able to do crafting with her hands on the prednisone  Did not help the mid thoracic pain, still has to take 2 hydrocodone a day  She is  also going to be seeing a neurologist for post Covid symptoms  Continues to have fluctuations in her blood pressure.  At times she will feel dizzy.  Will be getting a Holter monitor    1/19/2022:  Presents for f/u of possible PMR  Was started on prednisone 15 mg daily with a tapering dose  Now down to prednisone 5 mg daily  Pain was better in the shoulders and hips at a higher dose  Was able to go up and down the stairs easily on the higher dose  Went down to prednisone 10 mg daily felt more pain in neck/shoulders and also left trochanteric bursa region  Also has a lot of pain in the middle of the back with some numbness and tingling in the hands, has to take the norco due to the pain     5/18/2022:  Presents for f/u of possible PMR  She presented with a lot of joint pain and stiffness involving her shoulder, neck and hip region.  When she was placed on prednisone her symptoms improved significantly  She was weaned off prednisone back in March and started to have a lot of diffuse joint pain throughout her body.  She was seen by her PCP who put her back on prednisone 5 mg daily end of April, she is now down to 2.5 mg daily  She notices on the lower dose that her joint pain is not controlled.  Also going from 5 mg to 2.5 mg her GI symptoms are worse.    7/20/2022:  Presents for f/u of possible PMR  She presented with a lot of joint pain and stiffness involving her shoulder, neck and hip region.  When she was placed on prednisone her symptoms improved significantly  Now on prednisone 2 mg daily   Every time she remains down on prednisone her GI symptoms worsen.  She has significant diarrhea  She feels swollen all over, in her legs and face.  Continues to have pain in her upper back, shoulder blades and neck  Also continues to have a lot of mid lower back.  She has take the Norco to treat this back pain  Also having pain in left side of back, if sits for too long hard to get up and walk micheal in the left lower back and  the left lateral thigh can go numb  She states after having COVID and the vaccine all her symptoms worsen    11/2/2022:  Presents for f/u of overall joint pain  Had COVID again in September, was weaned off prednisone at that time. She was seen by PCP and was placed back on prednisone 5 mg daily, took it for 1 week then 2.5 mg daily for 1 week and felt better  Pain was better controlled on prednisone  Still having pain on  mg daily  Having easy bruising since starting HCQ  Having some changes in vision, things will look concave or convex. These started once started on HCQ  Has pain upper back, mid back and left hip pain  At time hands will go numb, happening daily now, when has more mid back pain has the numbness in the hands  Also having b/l ankle pain, swelling. Left is right than left      2/3/2023:  Presents for f/u for follow up +KEILA and polyarthralgia and myalgia, possible PMR.   Currently on prednisone 5 mg daily.  Also on Cymbalta 20 mg daily that was prescribed by her PCP  Her pain is better on prednisone.  Shoulders and hips and joints do feel better on prednisone.  She continues to have pain though but it is tolerable  Had a lot of pain in her left ankle.  At times it will swell.  MRI was ordered but not approved  She received to the ED due to dizziness and low blood pressure, they were concerned about migraines or adrenal insufficiency.  Advised patient that she is on a low-dose prednisone, unlikely adrenal insufficiency but recommend to see endocrinology  She also has pain and swelling in her right knee, its hard to walk or go up or down stairs.    4/16/2024:  Presents for f/u for follow up +KEILA and polyarthralgia and myalgia, possible PMR.   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  There is no synovitis or  erosions to suggest inflammatory arthritis  During her last visit her right knee was injected with cortisone  She had COVID and then fainted and worsened the left foot. She is seeing podiatry and was given an ankle brace but not helping. Its is also hard to put on, causing more back pain  Continues to have swelling in the left ankle  She is not taking prednisone, A1c was trending up on prednisone   Having more joint pain, left 4th finger is very painful in the morning and swollen and hard to bend  Pain also in the left wrist   Continues to have mid and lower back pain and its worsening, also worsening pain in the left hip. Left hip pain is significant, can't sleep at night due to the pain  Feels a lot of back pain and when she flares it causes left leg numbness and right hand numbness     11/6/2024:  Presents for f/u for follow up +KEILA and polyarthralgia and myalgia,   She had US hands showing synovial thickening and hyperemia in the left middle finger MCO joint consistent with active synovitis.  MRI left ankle showed chronic partial thickness tear of talofibular ligament and mild peroneal and achilles tendonitis   Also had MRI thoracic spine showing mild DJD  Continues to have joint pain in the hands, shoulders, hips and mid back   Also has diffuse muscle pain micheal the arms and left leg  Joints feel better on higher dose of prednisone   She is now on Rhu tox homeopathic medication and helping hand pain  Now has a lacy rash in the lower legs     6/3/2025:  Presents for f/u for follow up +KEILA and polyarthralgia and myalgia,   She had US hands showing synovial thickening and hyperemia in the left middle finger MCO joint consistent with active synovitis.  MRI left ankle showed chronic partial thickness tear of talofibular ligament and mild peroneal and achilles tendonitis   Also had MRI thoracic spine showing mild DJD  She is now on  mg daily but still has pain  Also taking tizandine but flexeril works  better  She noticed when she was on jaurdiance she had vaginal infections and recurrent UTI, once off her symptoms have improved   She is not on anything for DM due to insurance   Continues to have diffuse joint and muscle pain  Having worsening pain in right thumb, thumb can burn at times, started 2 mos ago after sanding plastic   Still having pain in the shoulders, elbows and back region  Also having pain in left knee, she fell in March and has been following with orthopedic at [facility], still has pain in the left knee          HISTORY:  Past Medical History:    Allergic rhinitis    Anxiety state, unspecified    Back problem    Bowel habit changes    colonoscopy 2010    Cholelithiasis    cholecystectomy 2009    Constipation    COVID-19    Depression    due to daily pain    Diabetes (HCC)    Disorder of thyroid    Endometriosis    Endometriosis/bleeding, Total Hysterectomy    Esophageal reflux    Fibromyalgia    High blood pressure    Hyperthyroidism    Hypothyroidism    Lipid screening    per NG    Migraines    Muscle weakness    Obesity, unspecified    TMJ (temporomandibular joint disorder)    \"TMJ\"    Tonsillitis    tonsillectomy 1979    Unspecified sleep apnea    no cpap use    Visual impairment    glassses      Social Hx Reviewed   Family Hx Reviewed     Medications (Active prior to today's visit):  Current Outpatient Medications   Medication Sig Dispense Refill    hydroxychloroquine 200 MG Oral Tab Take 1 tablet (200 mg total) by mouth daily. 180 tablet 0    PROBIOTIC PRODUCT OR Take 1 tablet by mouth daily.      tiZANidine 2 MG Oral Tab Take 1 tablet (2 mg total) by mouth in the morning and 1 tablet (2 mg total) in the evening and 1 tablet (2 mg total) before bedtime.      Na Sulfate-K Sulfate-Mg Sulf (SUPREP BOWEL PREP KIT) 17.5-3.13-1.6 GM/177ML Oral Solution Take as directed 1 each 0    dicyclomine 10 MG Oral Cap Take 1 capsule (10 mg total) by mouth 3 (three) times daily before meals as needed. 90 capsule 0     estradiol (ESTRACE) 0.1 MG/GM Vaginal Cream Apply fingertip amount of cream to the vagina every night for 1 week and then every other night indefinitely 42.5 g 5    valsartan 80 MG Oral Tab Take 1 tablet (80 mg total) by mouth in the morning. 90 tablet 1    Glucose Blood (ONETOUCH ULTRA) In Vitro Strip 1 each by Other route daily. 100 strip 1    LEVOTHYROXINE 125 MCG Oral Tab TAKE 1 TABLET(125 MCG) BY MOUTH BEFORE BREAKFAST 90 tablet 1    clonazePAM 0.5 MG Oral Tab Take 1 tablet (0.5 mg total) by mouth nightly as needed (insomnia). 30 tablet 3    ERGOCALCIFEROL 1.25 MG (01769 UT) Oral Cap TAKE 1 CAPSULE BY MOUTH 1 TIME A WEEK 12 capsule 0    Lancets (ONETOUCH DELICA PLUS OWHXXW12P) Does not apply Misc 1 lancet by Finger stick route daily. 100 each 3    Clobetasol Propionate 0.05 % External Ointment       lidocaine-menthol 4-1 % External Patch Place 1 patch onto the skin daily as needed. 7 patch 0    Cranberry 250 MG Oral Tab Take 2 tablets by mouth daily as needed.      omeprazole 20 MG Oral Capsule Delayed Release Take 1 capsule (20 mg total) by mouth every morning. (Patient not taking: Reported on 6/3/2025) 90 capsule 1     .cmed  Allergies:  Allergies   Allergen Reactions    Morphine PAIN     migraine    Prochlorperazine JITTERY     Compazine    Prochlorperazine Edisylate JITTERY    Sumatriptan FATIGUE     Left side weakness  Left side weakness  Other reaction(s): weakness         ROS:   All other ROS are negative.     PHYSICAL EXAM:   GEN: AAOx3, NAD  HEENT: EOMI, PERRLA, no injection or icterus, oral mucosa moist, no oral lesions. No lymphadenopathy. No facial rash  CVS: RRR, no murmurs rubs or gallops. Equal 2+ distal pulses.   LUNGS: CTAB, no increased work of breathing  ABDOMEN:  soft NT/ND, +BS, no HSM  SKIN: No rashes or skin lesions. No nail findings  MSK:  TTP in paraspinal region of her spine, shoulders, trapezius region, trochanteric bursa's and quadriceps  Cervical spine: FROM  Hands: TTP in MCPs  and PIPs, able to make a fist  Wrist: Left wrist TTP, pain with extension   Elbow: TTP in b/l lateral epicondyle region  Shoulders: Left shoulder abduction limited to 90 degrees   Hip: normal log roll, +TTP in L trochanteric bursa, NEGAR test negative b/l  Knees: R knee TTP  Ankles: FROM, no pain or swelling or warmth on palpation  Feet: no pain with MTP squeeze, no toe swelling or pain or warmth on palpation with FROM  Spine: no lumbar or sacral pain on palpation.  NEURO: Cranial nerves II-XII intact grossly. 5/5 strength throughout in both upper and lower extremities, sensation intact.  PSYCH: normal mood       LABS:     Component      Latest Ref Rng & Units 9/20/2021 6/3/2021 4/13/2021 8/22/2020   KEILA Titer/Pattern      <80 320 (A)      Reviewed By:       Osvaldo Bishop M.D.      KEILA SCREEN      Negative Positive (A)   Negative   SED RATE      0 - 30 mm/Hr 11  8 9   RHEUMATOID FACTOR      <15 IU/mL    <10   C-Citrullinated Peptide IgG AB      0.0 - 6.9 U/mL    0.8   C-REACTIVE PROTEIN      <0.30 mg/dL 0.47 (H) 0.86 (H) 0.52 (H)      Imaging:     MRI Left ankle 11/2023:  No synovitis or osseous erosions to suggest inflammatory arthritis.   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   Mild peroneal tenosynovitis.   Mild Achilles paratenonitis.     MRI L spine 6/2020:  Mild degenerative disc and facet disease throughout the lumbar spine without high-grade canal or foraminal narrowing.    No acute fracture, dislocation or marrow replacing lesion within lumbar spine.  No abnormal enhancement.     MRI T spine:  Mild endplate degenerative changes throughout the thoracic spine without high-grade canal or foraminal narrowing.     MRI C spine:   No acute abnormality.  No disc disease.  No central canal or neural foraminal narrowing.  No cord signal change or marrow signal abnormality.  No enhancing osseous or cord lesion.     XR R knee:  CONCLUSION:   Stable mild right knee joint osteoarthritis with  greatest involvement of the patellofemoral compartment    ASSESSMENT/PLAN:     UCTD (+KEILA, joint pain, active synovitis in left 3rd MCP, livido reticularis)  - Symptoms started after having Covid back in 2020 and again worsened after having the vaccine in September 2021  - She has a lot of joint pain involving her hands, wrists, shoulders and knees.  Also myalgias throughout her body  - In the past she had a negative KEILA that was positive. Further blood work showed a negative FEDE panel, normal RF and CCP.  Normal inflammation markers   - MRI of the left ankle showed no synovitis but chronic partial-thickness tear of the talofibular ligament, mild peroneal and Achilles tendinitis  - US hand some active synovitis in the left third MCP  - She will increase  mg daily  - He was on tizanidine but joints and muscles feel better on Flexeril.  She will go back on Flexeril 5 mg at night to help her muscle pain.    Right thumb pain  - She will try a thumb splint and Voltaren gel    Left knee pain s/p fall  - following with orthopedic     R knee pain- stable   - R knee injected with cortisone last year     Bilateral ankle pain, left worse than right  - MRI L ankle showing more tendon issues, no synovitis  - seeing podiatry and in a brace but not helping     Chronic back pain  - She has a lot of her pain is in her mid back and lower spine.  She was seen by physiatry in the past.  She will try Flexeril once or twice a day to see if that helps  - MRI showing some mild disc disease    L trochanteric bursitis- improved  - L trochanteric bursa injected in November, noticed some improvement  Hx of Fibromyalgia  - Off tizanidine and Cymbalta, she has tried multiple other medications in the past either had side effects or did not work    Pt will f/u in 6 mos     There is a longitudinal care relationship with me, the care plan reflects the ongoing nature of the continuous relationship of care, and the medical record indicates that  there is ongoing treatment of a serious/complex medical condition which I am currently managing.  is Applicable.     Cass Gates MD  6/9/225  4:20 PM

## 2025-07-18 NOTE — TELEPHONE ENCOUNTER
Requested Prescriptions     Pending Prescriptions Disp Refills    HYDROcodone-acetaminophen 5-325 MG Oral Tab 30 tablet 0     Sig: Take 1 tablet by mouth daily as needed for Pain.     Please advise.   Patient Comment: Dear Dr. Gates, ‘Marlyn made a mistake submitting my hydrocodone prescription to my insurance even when I asked them not to do it. The insurance only covered 5 pills. They will not give me the rest of the pills. Would you be so kind to send another prescription but this time to [facility] pharmacy at [facility] [address] [place] [address]. With them, I usually pay out of my pocket and they fill me the total number of pills in the prescription. Thanks     Preferred pharmacy: Other - [facility] [address] [place] [address]        LOV: 6/3/25  Future Appointments   Date Time Provider Department Center   12/16/2025  5:40 PM Cass Gates MD [facility]     Labs:   Component      Latest Ref Rng 4/16/2024   C-Citrullinated Peptide IgG AB      0.0 - 6.9 U/mL 0.5    C-REACTIVE PROTEIN      <1.00 mg/dL 0.50    SED RATE      0 - 30 mm/Hr 21    RHEUMATOID FACTOR      <14 IU/mL <10    HLA-B27 Negative          Instructions    You were seen today for undifferentiated connective tissue disease and fibromyalgia  Try increasing hydroxychloroquine to 400 mg daily  Stop the tizanidine and try Flexeril 1 or 2 pills at night  I gave you Norco as needed  The thumb get a thumb splint and try Voltaren gel over-the-counter

## (undated) NOTE — LETTER
2/28/2019          To Whom It May Concern:    Chao Waldrop is currently under my medical care . She is advised to be off school for the next week. May return to school on March 11, 2018  If you require additional information please contact our office.

## (undated) NOTE — MR AVS SNAPSHOT
[facility]  [address]  [phone]               Thank you for choosing us for your health care visit with Barb Keller MD.  We are glad to serve you and happy to provide you with this ramos * Levothyroxine Sodium 125 MCG Tabs   Take 1 tablet (125 mcg total) by mouth before breakfast.   Commonly known as:  SYNTHROID, LEVOTHROID           * Levothyroxine Sodium 100 MCG Tabs   Commonly known as:  SYNTHROID           MetFORMIN HCl  [email or web address]

## (undated) NOTE — LETTER
WHERE IS YOUR PAIN NOW?  Alvaro the areas on your body where you feel the described sensations.  Use the appropriate symbol.  Alvaro the areas of radiation.  Include all affected areas.  Just to complete the picture, please draw in the face.     ACHE:  ^ ^ ^   NUMBNESS:  0000   PINS & NEEDLES:  = = = =                              ^ ^ ^                       0000              = = = =                                    ^ ^ ^                       0000            = = = =      BURNING:  XXXX   STABBING: ////                  XXXX                ////                         XXXX          ////     Please alvaro the line below indicating your degree of pain right now  with 0 being no pain 10 being the worst pain possible.                                         0             1             2              3             4              5              6              7             8             9             10         Patient Signature:

## (undated) NOTE — MR AVS SNAPSHOT
[address] [address]  [phone]               Thank you for choosing us for your health care visit with Carlos Sanchez MD.  We are glad to serve you and happy to provide you with this summary T2DM (type 2 diabetes mellitus) ([facility].)     Diabetes most likely insulin resistance. Exercise, diet controlled and metformin should help reverse some of these changes. Restriction and starches as well as carbohydrates discussed.   Patient did work with the TAKE 1 TABLET BY MOUTH EVERY 6 HOURS AS NEEDED FOR PAIN   What changed:  See the new instructions.    Commonly known as:  FIORICET, ESGIC           Citalopram Hydrobromide 20 MG Tabs   TAKE 1 TABLET(20 MG) BY MOUTH DAILY   What changed:  See the new instruc - amphetamine-dextroamphetamine 15 MG Tabs  - Butalbital-APAP-Caffeine -40 MG Tabs  - TraMADol HCl 50 MG Tabs            Today's Orders     XR KNEE ROUTINE (3 VIEWS), RIGHT (BQZ=[id number])    Complete by:  Jan 24, 2017 (Approximate)    Assoc Dx:  Primary acquired. Please contact the Patient Business Office at [phone] if you have any questions related to insurance coverage. Thank you.          Referral Orders      Normal Orders This Visit    ORTHOPEDIC - INTERNAL [[id number] CUSTOM]  Order #:  [id number] Lifestyle Modification Recommendations:    Modification Recommendation   Weight Reduction Maintain normal body weight (body mass index 18.5-24.9 kg/m2)   DASH eating plan Adopt a diet rich in fruits, vegetables, and low fat dairy products with reduced cont motivated   Don’t forget strength training with weights and resistance Set goals and track your progress   You don’t need to join a gym. Home exercises work great.  Put more priority on exercise in your life                    Visit [facility]

## (undated) NOTE — Clinical Note
Dear Angely,  I had the opportunity to see your patient Jewels Almanzar recently. I appreciate your confidence in me to care for your patients. Please feel free call me with any questions at [phone] or contact me through Epic.  Sincerely, Emmanuel Hernandez MD Board Certified, Physical Medicine and Rehabilitation Specializing in Sports Medicine, Spine Medicine and Electrodiagnostic Medicine [facility]

## (undated) NOTE — LETTER
02/06/18        Beatriz Mitchell  [address]  [place] [address]      Dear Paramjit Esposito,    [facility] records indicate that you have outstanding lab work and or testing that was ordered for you and has not yet been completed:          CBC W Differential W P

## (undated) NOTE — LETTER
[facility]  Administration of Anesthesia  Jewels ANAND agree to be cared for by a physician anesthesiologist alone and/or with a nurse anesthetist, who is specially trained to monitor me and give me medicine to put me to sleep or keep me comfortable during my procedure    I understand that my anesthesiologist and/or anesthetist is not an employee or agent of [facility] or [employer]. He or she works for [facility] [facility]    As the patient asking for anesthesia services, I agree to:  Allow the anesthesiologist (anesthesia doctor) to give me medicine and do additional procedures as necessary. Some examples are: Starting or using an “IV” to give me medicine, fluids or blood during my procedure, and having a breathing tube placed to help me breathe when I’m asleep (intubation). In the event that my heart stops working properly, I understand that my anesthesiologist will make every effort to sustain my life, unless otherwise directed by [facility] Do Not Resuscitate documents.  Tell my anesthesia doctor before my procedure:  If I am pregnant.  The last time that I ate or drank.  iii. All of the medicines I take (including prescriptions, herbal supplements, and pills I can buy without a prescription (including street drugs/illegal medications). Failure to inform my anesthesiologist about these medicines may increase my risk of anesthetic complications.  iv.If I am allergic to anything or have had a reaction to anesthesia before.  I understand how the anesthesia medicine will help me (benefits).  I understand that with any type of anesthesia medicine there are risks:  The most common risks are: nausea, vomiting, sore throat, muscle soreness, damage to my eyes, mouth, or teeth (from breathing tube placement).  Rare risks include: remembering what happened during my procedure, allergic reactions to medications, injury to my airway, heart, lungs, vision, nerves, or  muscles and in extremely rare instances death.  My doctor has explained to me other choices available to me for my care (alternatives).  Pregnant Patients (“epidural”):  I understand that the risks of having an epidural (medicine given into my back to help control pain during labor), include itching, low blood pressure, difficulty urinating, headache or slowing of the baby’s heart. Very rare risks include infection, bleeding, seizure, irregular heart rhythms and nerve injury.  Regional Anesthesia (“spinal”, “epidural”, & “nerve blocks”):  I understand that rare but potential complications include headache, bleeding, infection, seizure, irregular heart rhythms, and nerve injury.    _____________________________________________________________________________  Patient (or Representative) Signature/Relationship to Patient  Date   Time    _____________________________________________________________________________   Name (if used)    Language/Organization   Time    _____________________________________________________________________________  Nurse Anesthetist Signature     Date   Time  _____________________________________________________________________________  Anesthesiologist Signature     Date   Time  I have discussed the procedure and information above with the patient (or patient’s representative) and answered their questions. The patient or their representative has agreed to have anesthesia services.    _____________________________________________________________________________  Witness        Date   Time  I have verified that the signature is that of the patient or patient’s representative, and that it was signed before the procedure  Patient Name: Jewels Almanzar     : 1965                 Printed: 2025 at 2:33 PM    Medical Record #: [id number]                                            Page 1 of 1  ----------ANESTHESIA CONSENT----------

## (undated) NOTE — LETTER
4/5/2022    Samantha Bazan        [address], [address]LO[address]        [facility] [address]            Dear Oksana Moran,      Our records indicate that you are due for an appointment for an EGD or Upper Endoscopy in June 2022, or sometime there after, with Nereida Michelle MD.    Please call our office to schedule this appointment. Your medical well-being is important to us. If your insurance requires a referral, please call your primary care office to request one.       Thank you,      The Physicians and Staff at [facility]

## (undated) NOTE — MR AVS SNAPSHOT
[address] [place] [address] [address]  [phone]               Thank you for choosing us for your health care visit with Zeynep Meadows MD.  We are glad to serve you and happy to provide you with this summary This list is accurate as of: 3/3/17  5:57 PM.  Always use your most recent med list.                * amphetamine-dextroamphetamine 15 MG Tabs   Take 1 tablet (15 mg total) by mouth 2 (two) times daily.    Commonly known as:  ADDERALL           * amphetamin MyChart     Visit [facility]  You can access your MyChart to more actively manage your health care and view more details from this visit by going to [email or web address].   If you've recently had a stay at the Hospital you can access your dischar

## (undated) NOTE — LETTER
[facility]  [address], [place], [place]    Authorization for Surgical Operation and Procedure                               I hereby authorize Cyril Betts MD, my physician and his/her assistants (if applicable), which may include medical students, residents, and/or fellows, to perform the following surgical operation/ procedure and administer such anesthesia as may be determined necessary by my physician: Operation/Procedure name (s) COLONOSCOPY / ESOPHAGOGASTRODUODENOSCOPY on Jewels Almanzar   2.   I recognize that during the surgical operation/procedure, unforeseen conditions may necessitate additional or different procedures than those listed above.  I, therefore, further authorize and request that the above-named surgeon, assistants, or designees perform such procedures as are, in their judgment, necessary and desirable.    3.   My surgeon/physician has discussed prior to my surgery the potential benefits, risks and side effects of this procedure; the likelihood of achieving goals; and potential problems that might occur during recuperation.  They also discussed reasonable alternatives to the procedure, including risks, benefits, and side effects related to the alternatives and risks related to not receiving this procedure.  I have had all my questions answered and I acknowledge that no guarantee has been made as to the result that may be obtained.    4.   Should the need arise during my operation/procedure, which includes change of level of care prior to discharge, I also consent to the administration of blood and/or blood products.  Further, I understand that despite careful testing and screening of blood or blood products by collecting agencies, I may still be subject to ill effects as a result of receiving a blood transfusion and/or blood products.  The following are some, but not all, of the potential risks that can occur: fever and allergic reactions, hemolytic reactions,  transmission of diseases such as Hepatitis, AIDS and Cytomegalovirus (CMV) and fluid overload.  In the event that I wish to have an autologous transfusion of my own blood, or a directed donor transfusion, I will discuss this with my physician.  Check only if Refusing Blood or Blood Products  I understand refusal of blood or blood products as deemed necessary by my physician may have serious consequences to my condition to include possible death. I hereby assume responsibility for my refusal and release the hospital, its personnel, and my physicians from any responsibility for the consequences of my refusal.    o  Refuse   5.   I authorize the use of any specimen, organs, tissues, body parts or foreign objects that may be removed from my body during the operation/procedure for diagnosis, research or teaching purposes and their subsequent disposal by hospital authorities.  I also authorize the release of specimen test results and/or written reports to my treating physician on the hospital medical staff or other referring or consulting physicians involved in my care, at the discretion of the Pathologist or my treating physician.    6.   I consent to the photographing or videotaping of the operations or procedures to be performed, including appropriate portions of my body for medical, scientific, or educational purposes, provided my identity is not revealed by the pictures or by descriptive texts accompanying them.  If the procedure has been photographed/videotaped, the surgeon will obtain the original picture, image, videotape or CD.  The hospital will not be responsible for storage, release or maintenance of the picture, image, tape or CD.    7.   I consent to the presence of a  or observers in the operating room as deemed necessary by my physician or their designees.    8.   I recognize that in the event my procedure results in extended X-Ray/fluoroscopy time, I may develop a skin reaction.    9. If  I have a Do Not Attempt Resuscitation (DNAR) order in place, that status will be suspended while in the operating room, procedural suite, and during the recovery period unless otherwise explicitly stated by me (or a person authorized to consent on my behalf). The surgeon or my attending physician will determine when the applicable recovery period ends for purposes of reinstating the DNAR order.  10. Patients having a sterilization procedure: I understand that if the procedure is successful the results will be permanent and it will therefore be impossible for me to inseminate, conceive, or bear children.  I also understand that the procedure is intended to result in sterility, although the result has not been guaranteed.   11. I acknowledge that my physician has explained sedation/analgesia administration to me including the risk and benefits I consent to the administration of sedation/analgesia as may be necessary or desirable in the judgment of my physician.    I CERTIFY THAT I HAVE READ AND FULLY UNDERSTAND THE ABOVE CONSENT TO OPERATION and/or OTHER PROCEDURE.     ____________________________________  _________________________________        ______________________________  Signature of Patient    Signature of Responsible Person                Printed Name of Responsible Person                                      ____________________________________  _____________________________                ________________________________  Signature of Witness        Date  Time         Relationship to Patient    STATEMENT OF PHYSICIAN My signature below affirms that prior to the time of the procedure; I have explained to the patient and/or his/her legal representative, the risks and benefits involved in the proposed treatment and any reasonable alternative to the proposed treatment. I have also explained the risks and benefits involved in refusal of the proposed treatment and alternatives to the proposed treatment and have  answered the patient's questions. If I have a significant financial interest in a co-management agreement or a significant financial interest in any product or implant, or other significant relationship used in this procedure/surgery, I have disclosed this and had a discussion with my patient.     _____________________________________________________              _____________________________  (Signature of Physician)                                                                                         (Date)                                   (Time)  Patient Name: Jewels MONTERROSO Almanzar      : 1965      Printed: 2025     Medical Record #: [id number]                                      Page 1 of 1